# Patient Record
Sex: FEMALE | Race: WHITE | Employment: OTHER | URBAN - METROPOLITAN AREA
[De-identification: names, ages, dates, MRNs, and addresses within clinical notes are randomized per-mention and may not be internally consistent; named-entity substitution may affect disease eponyms.]

---

## 2019-04-09 ENCOUNTER — PATIENT OUTREACH (OUTPATIENT)
Dept: CASE MANAGEMENT | Age: 79
End: 2019-04-09

## 2019-04-09 ENCOUNTER — HOSPITAL ENCOUNTER (OUTPATIENT)
Dept: LAB | Age: 79
Discharge: HOME | End: 2019-04-09
Payer: MEDICARE

## 2019-04-09 DIAGNOSIS — C50.919 MALIGNANT NEOPLASM OF FEMALE BREAST, UNSPECIFIED ESTROGEN RECEPTOR STATUS, UNSPECIFIED LATERALITY, UNSPECIFIED SITE OF BREAST (HCC): ICD-10-CM

## 2019-04-09 LAB
ALBUMIN SERPL-MCNC: 3.7 G/DL (ref 3.2–4.6)
ALBUMIN/GLOB SERPL: 1 {RATIO} (ref 1.2–3.5)
ALP SERPL-CCNC: 58 U/L (ref 50–136)
ALT SERPL-CCNC: 17 U/L (ref 12–65)
ANION GAP SERPL CALC-SCNC: 5 MMOL/L (ref 7–16)
AST SERPL-CCNC: 8 U/L (ref 15–37)
BASOPHILS # BLD: 0.1 K/UL (ref 0–0.2)
BASOPHILS NFR BLD: 1 % (ref 0–2)
BILIRUB SERPL-MCNC: 0.3 MG/DL (ref 0.2–1.1)
BUN SERPL-MCNC: 16 MG/DL (ref 8–23)
CALCIUM SERPL-MCNC: 9.2 MG/DL (ref 8.3–10.4)
CHLORIDE SERPL-SCNC: 108 MMOL/L (ref 98–107)
CO2 SERPL-SCNC: 28 MMOL/L (ref 21–32)
CREAT SERPL-MCNC: 0.91 MG/DL (ref 0.6–1)
DIFFERENTIAL METHOD BLD: ABNORMAL
EOSINOPHIL # BLD: 0.2 K/UL (ref 0–0.8)
EOSINOPHIL NFR BLD: 3 % (ref 0.5–7.8)
ERYTHROCYTE [DISTWIDTH] IN BLOOD BY AUTOMATED COUNT: 16.6 % (ref 11.9–14.6)
GLOBULIN SER CALC-MCNC: 3.7 G/DL (ref 2.3–3.5)
GLUCOSE SERPL-MCNC: 107 MG/DL (ref 65–100)
HCT VFR BLD AUTO: 38.9 % (ref 35.8–46.3)
HGB BLD-MCNC: 12.5 G/DL (ref 11.7–15.4)
IMM GRANULOCYTES # BLD AUTO: 0 K/UL (ref 0–0.5)
IMM GRANULOCYTES NFR BLD AUTO: 0 % (ref 0–5)
LYMPHOCYTES # BLD: 2.8 K/UL (ref 0.5–4.6)
LYMPHOCYTES NFR BLD: 41 % (ref 13–44)
MCH RBC QN AUTO: 28 PG (ref 26.1–32.9)
MCHC RBC AUTO-ENTMCNC: 32.1 G/DL (ref 31.4–35)
MCV RBC AUTO: 87.2 FL (ref 79.6–97.8)
MONOCYTES # BLD: 0.5 K/UL (ref 0.1–1.3)
MONOCYTES NFR BLD: 7 % (ref 4–12)
NEUTS SEG # BLD: 3.2 K/UL (ref 1.7–8.2)
NEUTS SEG NFR BLD: 48 % (ref 43–78)
NRBC # BLD: 0 K/UL (ref 0–0.2)
PLATELET # BLD AUTO: 291 K/UL (ref 150–450)
PMV BLD AUTO: 9.9 FL (ref 9.4–12.3)
POTASSIUM SERPL-SCNC: 3.8 MMOL/L (ref 3.5–5.1)
PROT SERPL-MCNC: 7.4 G/DL (ref 6.3–8.2)
RBC # BLD AUTO: 4.46 M/UL (ref 4.05–5.25)
SODIUM SERPL-SCNC: 141 MMOL/L (ref 136–145)
WBC # BLD AUTO: 6.7 K/UL (ref 4.3–11.1)

## 2019-04-09 PROCEDURE — 80053 COMPREHEN METABOLIC PANEL: CPT

## 2019-04-09 PROCEDURE — 36415 COLL VENOUS BLD VENIPUNCTURE: CPT

## 2019-04-09 PROCEDURE — 85025 COMPLETE CBC W/AUTO DIFF WBC: CPT

## 2019-04-09 NOTE — PROGRESS NOTES
4/9/2019 Saw the patient with Dr Johan Barba. She recently was diagnosed with with breast cancer. Her breast cancer was estrogen positive, progesterone positive and Her 2 positive by FISH. Dr Johan Barba would like her to have an MRI. We will also arrange for echo, port placement and chemo education for HILDA MARISCAL. She will be presented at tumor board after her MRI. Navigation information given to the patient. Will continue to follow.

## 2019-04-17 ENCOUNTER — ANESTHESIA EVENT (OUTPATIENT)
Dept: SURGERY | Age: 79
End: 2019-04-17
Payer: MEDICARE

## 2019-04-17 RX ORDER — SODIUM CHLORIDE, SODIUM LACTATE, POTASSIUM CHLORIDE, CALCIUM CHLORIDE 600; 310; 30; 20 MG/100ML; MG/100ML; MG/100ML; MG/100ML
75 INJECTION, SOLUTION INTRAVENOUS CONTINUOUS
Status: CANCELLED | OUTPATIENT
Start: 2019-04-17

## 2019-04-17 RX ORDER — LIDOCAINE HYDROCHLORIDE 10 MG/ML
0.1 INJECTION INFILTRATION; PERINEURAL AS NEEDED
Status: CANCELLED | OUTPATIENT
Start: 2019-04-17

## 2019-04-17 RX ORDER — FAMOTIDINE 20 MG/1
20 TABLET, FILM COATED ORAL ONCE
Status: CANCELLED | OUTPATIENT
Start: 2019-04-17 | End: 2019-04-17

## 2019-04-17 RX ORDER — OXYCODONE HYDROCHLORIDE 5 MG/1
10 TABLET ORAL
Status: CANCELLED | OUTPATIENT
Start: 2019-04-17 | End: 2019-04-18

## 2019-04-17 RX ORDER — SODIUM CHLORIDE, SODIUM LACTATE, POTASSIUM CHLORIDE, CALCIUM CHLORIDE 600; 310; 30; 20 MG/100ML; MG/100ML; MG/100ML; MG/100ML
75 INJECTION, SOLUTION INTRAVENOUS CONTINUOUS
Status: CANCELLED | OUTPATIENT
Start: 2019-04-17 | End: 2019-04-18

## 2019-04-17 RX ORDER — OXYCODONE HYDROCHLORIDE 5 MG/1
5 TABLET ORAL
Status: CANCELLED | OUTPATIENT
Start: 2019-04-17 | End: 2019-04-18

## 2019-04-17 RX ORDER — HYDROMORPHONE HYDROCHLORIDE 2 MG/ML
0.5 INJECTION, SOLUTION INTRAMUSCULAR; INTRAVENOUS; SUBCUTANEOUS
Status: CANCELLED | OUTPATIENT
Start: 2019-04-17

## 2019-04-18 ENCOUNTER — HOSPITAL ENCOUNTER (OUTPATIENT)
Dept: INTERVENTIONAL RADIOLOGY/VASCULAR | Age: 79
Discharge: HOME | End: 2019-04-18
Attending: INTERNAL MEDICINE
Payer: MEDICARE

## 2019-04-18 ENCOUNTER — ANESTHESIA (OUTPATIENT)
Dept: SURGERY | Age: 79
End: 2019-04-18
Payer: MEDICARE

## 2019-04-18 ENCOUNTER — HOSPITAL ENCOUNTER (OUTPATIENT)
Age: 79
Setting detail: OUTPATIENT SURGERY
Discharge: HOME | End: 2019-04-18
Attending: RADIOLOGY | Admitting: RADIOLOGY
Payer: MEDICARE

## 2019-04-18 VITALS
SYSTOLIC BLOOD PRESSURE: 144 MMHG | RESPIRATION RATE: 13 BRPM | OXYGEN SATURATION: 94 % | DIASTOLIC BLOOD PRESSURE: 67 MMHG | TEMPERATURE: 98 F | HEART RATE: 67 BPM

## 2019-04-18 VITALS
RESPIRATION RATE: 16 BRPM | TEMPERATURE: 98.2 F | DIASTOLIC BLOOD PRESSURE: 95 MMHG | BODY MASS INDEX: 30.22 KG/M2 | WEIGHT: 188 LBS | SYSTOLIC BLOOD PRESSURE: 171 MMHG | HEART RATE: 60 BPM | HEIGHT: 66 IN | OXYGEN SATURATION: 95 %

## 2019-04-18 DIAGNOSIS — C50.911 MALIGNANT NEOPLASM OF RIGHT BREAST IN FEMALE, ESTROGEN RECEPTOR POSITIVE, UNSPECIFIED SITE OF BREAST (HCC): ICD-10-CM

## 2019-04-18 DIAGNOSIS — Z17.0 MALIGNANT NEOPLASM OF RIGHT BREAST IN FEMALE, ESTROGEN RECEPTOR POSITIVE, UNSPECIFIED SITE OF BREAST (HCC): ICD-10-CM

## 2019-04-18 PROCEDURE — 77030031131 HC SUT MXN P COVD -B

## 2019-04-18 PROCEDURE — 77030037400 HC ADH TISS HI VISC EXOFIN CHMP -B

## 2019-04-18 PROCEDURE — 74011250636 HC RX REV CODE- 250/636

## 2019-04-18 PROCEDURE — 74011000250 HC RX REV CODE- 250: Performed by: PHYSICIAN ASSISTANT

## 2019-04-18 PROCEDURE — C1788 PORT, INDWELLING, IMP: HCPCS

## 2019-04-18 PROCEDURE — 74011250637 HC RX REV CODE- 250/637: Performed by: ANESTHESIOLOGY

## 2019-04-18 PROCEDURE — 36561 INSERT TUNNELED CV CATH: CPT

## 2019-04-18 PROCEDURE — 76060000032 HC ANESTHESIA 0.5 TO 1 HR: Performed by: RADIOLOGY

## 2019-04-18 PROCEDURE — 74011250636 HC RX REV CODE- 250/636: Performed by: ANESTHESIOLOGY

## 2019-04-18 PROCEDURE — 74011250636 HC RX REV CODE- 250/636: Performed by: PHYSICIAN ASSISTANT

## 2019-04-18 PROCEDURE — C1894 INTRO/SHEATH, NON-LASER: HCPCS

## 2019-04-18 PROCEDURE — 77030031139 HC SUT VCRL2 J&J -A

## 2019-04-18 RX ORDER — SODIUM CHLORIDE, SODIUM LACTATE, POTASSIUM CHLORIDE, CALCIUM CHLORIDE 600; 310; 30; 20 MG/100ML; MG/100ML; MG/100ML; MG/100ML
75 INJECTION, SOLUTION INTRAVENOUS CONTINUOUS
Status: DISCONTINUED | OUTPATIENT
Start: 2019-04-18 | End: 2019-04-19 | Stop reason: HOSPADM

## 2019-04-18 RX ORDER — LIDOCAINE HYDROCHLORIDE 20 MG/ML
INJECTION, SOLUTION EPIDURAL; INFILTRATION; INTRACAUDAL; PERINEURAL AS NEEDED
Status: DISCONTINUED | OUTPATIENT
Start: 2019-04-18 | End: 2019-04-18 | Stop reason: HOSPADM

## 2019-04-18 RX ORDER — PROPOFOL 10 MG/ML
INJECTION, EMULSION INTRAVENOUS AS NEEDED
Status: DISCONTINUED | OUTPATIENT
Start: 2019-04-18 | End: 2019-04-18 | Stop reason: HOSPADM

## 2019-04-18 RX ORDER — HEPARIN SODIUM 200 [USP'U]/100ML
1000 INJECTION, SOLUTION INTRAVENOUS AS NEEDED
Status: DISCONTINUED | OUTPATIENT
Start: 2019-04-18 | End: 2019-04-21 | Stop reason: HOSPADM

## 2019-04-18 RX ORDER — CEFAZOLIN SODIUM/WATER 2 G/20 ML
2 SYRINGE (ML) INTRAVENOUS ONCE
Status: COMPLETED | OUTPATIENT
Start: 2019-04-18 | End: 2019-04-18

## 2019-04-18 RX ORDER — PROPOFOL 10 MG/ML
INJECTION, EMULSION INTRAVENOUS
Status: DISCONTINUED | OUTPATIENT
Start: 2019-04-18 | End: 2019-04-18 | Stop reason: HOSPADM

## 2019-04-18 RX ORDER — HEPARIN 100 UNIT/ML
500 SYRINGE INTRAVENOUS AS NEEDED
Status: DISCONTINUED | OUTPATIENT
Start: 2019-04-18 | End: 2019-04-21 | Stop reason: HOSPADM

## 2019-04-18 RX ORDER — FAMOTIDINE 20 MG/1
20 TABLET, FILM COATED ORAL ONCE
Status: COMPLETED | OUTPATIENT
Start: 2019-04-18 | End: 2019-04-18

## 2019-04-18 RX ORDER — LIDOCAINE HYDROCHLORIDE 10 MG/ML
0.1 INJECTION INFILTRATION; PERINEURAL AS NEEDED
Status: DISCONTINUED | OUTPATIENT
Start: 2019-04-18 | End: 2019-04-21 | Stop reason: HOSPADM

## 2019-04-18 RX ADMIN — LIDOCAINE HYDROCHLORIDE 15 ML: 10; .005 INJECTION, SOLUTION EPIDURAL; INFILTRATION; INTRACAUDAL; PERINEURAL at 10:33

## 2019-04-18 RX ADMIN — SODIUM CHLORIDE, PRESERVATIVE FREE 500 UNITS: 5 INJECTION INTRAVENOUS at 10:48

## 2019-04-18 RX ADMIN — PROPOFOL 40 MG: 10 INJECTION, EMULSION INTRAVENOUS at 10:21

## 2019-04-18 RX ADMIN — LIDOCAINE HYDROCHLORIDE 60 MG: 20 INJECTION, SOLUTION EPIDURAL; INFILTRATION; INTRACAUDAL; PERINEURAL at 10:21

## 2019-04-18 RX ADMIN — Medication 2 G: at 10:27

## 2019-04-18 RX ADMIN — HEPARIN SODIUM 2000 UNITS: 200 INJECTION, SOLUTION INTRAVENOUS at 10:48

## 2019-04-18 RX ADMIN — SODIUM CHLORIDE, SODIUM LACTATE, POTASSIUM CHLORIDE, AND CALCIUM CHLORIDE: 600; 310; 30; 20 INJECTION, SOLUTION INTRAVENOUS at 10:13

## 2019-04-18 RX ADMIN — FAMOTIDINE 20 MG: 20 TABLET ORAL at 10:04

## 2019-04-18 RX ADMIN — PROPOFOL 100 MCG/KG/MIN: 10 INJECTION, EMULSION INTRAVENOUS at 10:21

## 2019-04-18 NOTE — DISCHARGE INSTRUCTIONS
[address]  Department of Interventional Radiology  [facility]  [phone] Office  [phone] Fax  Implanted Port Discharge Instructions      General Instructions:   A port is like an implanted IV. They are usually ordered for patients who will be getting chemotherapy, but can also be used as an IV for long term antibiotics, large amounts of fluids, and/or blood products. Your blood can be drawn from your port for labs also. Those patients who do not have good veins find the ports convenient as they can get the IV they need with one stick. The port can be used long term, and the care is easy. The device is under the skin, and once the skin heals, care is minimal. All that is required is the nurse who accesses the port will need to flush it with heparinized saline after each use. Ports are usually placed in the chest wall, usually on the right side. But they can be place in the arms and in the abdomen. Home Care Instructions: If your port is in your arm, do not allow blood pressure or other IVs to be place in that arm. Do not allow bra straps or any clothing to rub the skin over the port. Do not bathe or swim until the skin has healed and if the port is accessed. Once it is healed, and when the port is not accessed, it is okay to bathe and swim. Restrict yourself to light activity for the first 5 days after getting the port put in, after that, resume normal activity slowly. You may resume your normal diet and medications. Follow-Up Instructions: Please see your oncologist, or whatever physician ordered the port as he/she has requested of you. Call If: You should call your Physician and/or the Radiology Nurse if you notice redness, pus, swelling, or pain from the area of your incision. Call if you should develop a fever. The nurses who access your port will know to call your doctor if the port does not seem to be working properly. You need to tell the nurses who use the port if you should have any pain or swelling at the site during an infusion. To Reach Us: If you have any questions about your procedure, please call the Interventional Radiology department at [phone]. After business hours (5pm) and weekends, call the answering service at [phone] and ask for the Radiologist on call to be paged. Interventional Radiology General Nurse Discharge    After general anesthesia or intravenous sedation, for 24 hours or while taking prescription Narcotics:  · Limit your activities  · Do not drive and operate hazardous machinery  · Do not make important personal or business decisions  · Do  not drink alcoholic beverages  · If you have not urinated within 8 hours after discharge, please contact your surgeon on call. * Please give a list of your current medications to your Primary Care Provider. * Please update this list whenever your medications are discontinued, doses are     changed, or new medications (including over-the-counter products) are added. * Please carry medication information at all times in case of emergency situations. These are general instructions for a healthy lifestyle:    No smoking/ No tobacco products/ Avoid exposure to second hand smoke  Surgeon General's Warning:  Quitting smoking now greatly reduces serious risk to your health. Obesity, smoking, and sedentary lifestyle greatly increases your risk for illness  A healthy diet, regular physical exercise & weight monitoring are important for maintaining a healthy lifestyle    You may be retaining fluid if you have a history of heart failure or if you experience any of the following symptoms:  Weight gain of 3 pounds or more overnight or 5 pounds in a week, increased swelling in our hands or feet or shortness of breath while lying flat in bed.   Please call your doctor as soon as you notice any of these symptoms; do not wait until your next office visit. Recognize signs and symptoms of STROKE:  F-face looks uneven    A-arms unable to move or move unevenly    S-speech slurred or non-existent    T-time-call 911 as soon as signs and symptoms begin-DO NOT go       Back to bed or wait to see if you get better-TIME IS BRAIN.                 Patient Signature:  Date: 4/18/2019  Discharging Nurse: Victorino Mcclellan RN

## 2019-04-18 NOTE — H&P
Department of Interventional Radiology  [phone]    History and Physical    Patient:  Aisha Rausch MRN:  [id number]  SSN:  [id number]    YOB: 1940  Age:  77 y.o. Sex:  female      Primary Care Provider:  Mirian Freire DO  Referring Physician:  Torres Amado MD    Subjective:     Chief Complaint: port    History of the Present Illness: The patient is a 77 y.o. female who presents for venous chest port placement. Right breast cancer. NPO. Past Medical History:   Diagnosis Date    Anxiety     Cancer [facility])     melanoma    Cancer of right breast ([facility] [address] Dx 04/2019    Crohn's disease ([facility] [address]     Hypertension     Psychiatric disorder     anxiety/depression    Thyroid disease      Past Surgical History:   Procedure Laterality Date    HX HYSTERECTOMY      HX LUMBAR LAMINECTOMY          Review of Systems:    Pertinent items are noted in the History of Present Illness. No current outpatient medications on file. No current facility-administered medications for this encounter. Allergies   Allergen Reactions    Betadine [Povidone-Iodine] Hives    Pcn [Penicillins] Rash    Pentasa [Mesalamine] Other (comments)     Fever/chills       Family History   Problem Relation Age of Onset    Cancer Sister         melanoma    Cancer Brother         lung     Social History     Tobacco Use    Smoking status: Never Smoker    Smokeless tobacco: Never Used   Substance Use Topics    Alcohol use: Never     Frequency: Never        Objective:       Physical Examination:    Vitals:    04/18/19 0942   BP: 196/78   Pulse: (!) 58   Resp: 18   Temp: 98.2 °F (36.8 °C)   SpO2: 92%   Weight: 85.3 kg (188 lb)   Height: 5' 6\" (1.676 m)     Blood pressure 196/78, pulse (!) 58, temperature 98.2 °F (36.8 °C), resp. rate 18, height 5' 6\" (1.676 m), weight 85.3 kg (188 lb), SpO2 92 %.   HEART: regular rate and rhythm  LUNG: clear to auscultation bilaterally  ABDOMEN: normal findings: soft, non-tender  EXTREMITIES: normal strength, tone, and muscle mass    Laboratory:     Lab Results   Component Value Date/Time    Sodium 141 04/09/2019 08:51 AM    Potassium 3.8 04/09/2019 08:51 AM    Chloride 108 (H) 04/09/2019 08:51 AM    CO2 28 04/09/2019 08:51 AM    Anion gap 5 (L) 04/09/2019 08:51 AM    Glucose 107 (H) 04/09/2019 08:51 AM    BUN 16 04/09/2019 08:51 AM    Creatinine 0.91 04/09/2019 08:51 AM    GFR est AA >60 04/09/2019 08:51 AM    GFR est non-AA >60 04/09/2019 08:51 AM    Calcium 9.2 04/09/2019 08:51 AM    Albumin 3.7 04/09/2019 08:51 AM    Protein, total 7.4 04/09/2019 08:51 AM    Globulin 3.7 (H) 04/09/2019 08:51 AM    A-G Ratio 1.0 (L) 04/09/2019 08:51 AM    AST (SGOT) 8 (L) 04/09/2019 08:51 AM    ALT (SGPT) 17 04/09/2019 08:51 AM     Lab Results   Component Value Date/Time    WBC 6.7 04/09/2019 08:51 AM    HGB 12.5 04/09/2019 08:51 AM    HCT 38.9 04/09/2019 08:51 AM    PLATELET 108 50/24/6405 08:51 AM     No results found for: APTT, PTP, INR    Assessment:     Breast cancer    Plan:     Planned Procedure:  Port placement    Risks, benefits, and alternatives reviewed with patient and she agrees to proceed with the procedure.       Signed By: Fercho Allison PA-C     April 18, 2019

## 2019-04-18 NOTE — ANESTHESIA POSTPROCEDURE EVALUATION
Procedure(s): 
IR ANESTHESIA/ PORT INSERT/ [place] TO DO. 
 
total IV anesthesia Anesthesia Post Evaluation Multimodal analgesia: multimodal analgesia not used between 6 hours prior to anesthesia start to PACU discharge Patient location during evaluation: bedside Patient participation: complete - patient participated Level of consciousness: awake and alert Pain management: adequate Airway patency: patent Anesthetic complications: no 
Cardiovascular status: hemodynamically stable Respiratory status: acceptable Hydration status: acceptable No vitals data found for the desired time range.

## 2019-04-18 NOTE — PROCEDURES
Department of Interventional Radiology  [phone]        Interventional Radiology Brief Procedure Note    Patient: Lara Craig MRN: [id number]  SSN: [id number]    YOB: 1940  Age: 77 y.o.   Sex: female      Date of Procedure: 4/18/2019    Pre-Procedure Diagnosis: breast cancer    Post-Procedure Diagnosis: SAME    Procedure(s): Venous Chest Port Placement    Brief Description of Procedure: US, fluoro guided left IJ venous chest port placed    Performed By: Radu Everett PA-C     Assistants: None    Anesthesia:TIVS/MAC    Estimated Blood Loss: Less than 10ml    Specimens:  None    Implants:  Chest Port Placement    Findings: catheter tip in right atrium     Complications: None    Recommendations: ok to use port     Follow Up: referring MD    Signed By: Radu Everett PA-C     April 18, 2019

## 2019-04-18 NOTE — PROGRESS NOTES
Recovery period without difficulty. Pt alert and oriented and denies pain. Dressing is clean, dry, and intact. Reviewed discharge instructions with patient and daughter, both verbalized understanding. Pt escorted to lobby discharge area via wheelchair. Vital signs and Michelle score completed.

## 2019-04-18 NOTE — PROGRESS NOTES
Patient to [address] [place] [address] for procedure. Patient under care of anesthesia, please refer to anesthesia notes for intraprocedure vital signs and medications administered.

## 2019-04-18 NOTE — ANESTHESIA PREPROCEDURE EVALUATION
Relevant Problems No relevant active problems Anesthetic History Review of Systems / Medical History Pulmonary Sleep apnea: No treatment Neuro/Psych Psychiatric history Cardiovascular Hypertension: well controlled GI/Hepatic/Renal 
  
 
 
 
 
 
 Endo/Other Hypothyroidism: well controlled Obesity Other Findings Comments: Breast cancer Physical Exam 
 
Airway Mallampati: II 
TM Distance: > 6 cm Neck ROM: normal range of motion Mouth opening: Normal 
 
 Cardiovascular Regular rate and rhythm,  S1 and S2 normal,  no murmur, click, rub, or gallop Rhythm: regular Rate: normal 
 
 
 
 Dental 
No notable dental hx Pulmonary Breath sounds clear to auscultation Abdominal 
 
 
 
 Other Findings Anesthetic Plan ASA: 3 Anesthesia type: total IV anesthesia Anesthetic plan and risks discussed with: Patient

## 2019-04-24 ENCOUNTER — HOSPITAL ENCOUNTER (OUTPATIENT)
Dept: MRI IMAGING | Age: 79
Discharge: HOME | End: 2019-04-24
Attending: INTERNAL MEDICINE
Payer: MEDICARE

## 2019-04-24 DIAGNOSIS — C50.911 MALIGNANT NEOPLASM OF RIGHT BREAST IN FEMALE, ESTROGEN RECEPTOR POSITIVE, UNSPECIFIED SITE OF BREAST (HCC): ICD-10-CM

## 2019-04-24 DIAGNOSIS — Z17.0 MALIGNANT NEOPLASM OF RIGHT BREAST IN FEMALE, ESTROGEN RECEPTOR POSITIVE, UNSPECIFIED SITE OF BREAST (HCC): ICD-10-CM

## 2019-04-24 PROCEDURE — 77049 MRI BREAST C-+ W/CAD BI: CPT

## 2019-04-24 PROCEDURE — 74011250636 HC RX REV CODE- 250/636: Performed by: INTERNAL MEDICINE

## 2019-04-24 PROCEDURE — A9575 INJ GADOTERATE MEGLUMI 0.1ML: HCPCS | Performed by: INTERNAL MEDICINE

## 2019-04-24 PROCEDURE — 74011000258 HC RX REV CODE- 258: Performed by: INTERNAL MEDICINE

## 2019-04-24 RX ORDER — GADOTERATE MEGLUMINE 376.9 MG/ML
18 INJECTION INTRAVENOUS
Status: COMPLETED | OUTPATIENT
Start: 2019-04-24 | End: 2019-04-24

## 2019-04-24 RX ORDER — SODIUM CHLORIDE 0.9 % (FLUSH) 0.9 %
10 SYRINGE (ML) INJECTION
Status: COMPLETED | OUTPATIENT
Start: 2019-04-24 | End: 2019-04-24

## 2019-04-24 RX ADMIN — GADOTERATE MEGLUMINE 18 ML: 376.9 INJECTION INTRAVENOUS at 12:10

## 2019-04-24 RX ADMIN — Medication 10 ML: at 12:10

## 2019-04-24 RX ADMIN — SODIUM CHLORIDE 100 ML: 900 INJECTION, SOLUTION INTRAVENOUS at 12:10

## 2019-04-25 ENCOUNTER — HOSPITAL ENCOUNTER (OUTPATIENT)
Dept: RADIATION ONCOLOGY | Age: 79
Discharge: HOME | End: 2019-04-25
Payer: MEDICARE

## 2019-04-25 ENCOUNTER — DOCUMENTATION ONLY (OUTPATIENT)
Dept: HEMATOLOGY | Age: 79
End: 2019-04-25

## 2019-04-25 VITALS
OXYGEN SATURATION: 96 % | SYSTOLIC BLOOD PRESSURE: 162 MMHG | WEIGHT: 188.8 LBS | BODY MASS INDEX: 30.47 KG/M2 | DIASTOLIC BLOOD PRESSURE: 76 MMHG | TEMPERATURE: 97.8 F | HEART RATE: 65 BPM

## 2019-04-25 PROCEDURE — 99211 OFF/OP EST MAY X REQ PHY/QHP: CPT

## 2019-04-25 RX ORDER — VENLAFAXINE HYDROCHLORIDE 150 MG/1
CAPSULE, EXTENDED RELEASE ORAL DAILY
COMMUNITY
End: 2020-01-10

## 2019-04-25 NOTE — NURSE NAVIGATOR
Consult right breast. 
MRI 19. Chemo ed 19. History melanoma to back and face. Plan chemo, surgery. Pt has port. Getting Humira for Crohn's disease. Pt is  2/ Para 0-one miscarriage and one stillborn. Started menses around 11 yoa. Menses ended with total hysterectomy. History of IUD-no oral contraceptive use. Unsure of HRT history. Plan see/sim 3-4 weeks past surgery.  
 
 
Rizwan Mcgregor RN

## 2019-04-25 NOTE — CONSULTS
Patient: Marek Taveras MRN: [id number]  SSN: [id number]    YOB: 1940  Age: 77 y.o. Sex: female      Other Providers: Chuck Jaeger MD    CHIEF COMPLAINT: Breast cancer    DIAGNOSIS: invasive ductal carcinoma of the right breast, intermediate grade, %, UT 10%, HER-2 equivocal (2+), but HER-2 positive by FISH, clinical T1c N0    HISTORY OF PRESENT ILLNESS:  Marek Taveras is a 77 y.o. female who I am seeing at the request of Dr. Ghassan Dc. She has a medical history significant for anemia, anxiety, Crohn's disease, GERD, depression, melanoma, hypertension, hypothyroidism, hyperlipidemia. She was found to have an abnormality in the right breast on routine screening mammogram. She has a remote history of breast biopsy with benign finding. Right breast diagnostic mammogram and ultrasound on 03/18/19 revealed spiculated 1.5 cm mass in the upper inner quadrant of the right breast.  Ultrasound revealed an irregular, hypoechoic, approximately 1.5 cm mass at the 2 o'clock position in the right breast 3 cm from the nipple corresponding to mammographic abnormality. This was felt to be highly suggestive of malignancy, BI-RADS 5. Biopsy on 03/29/19 revealed invasive ductal carcinoma, grade 2, %, UT 10%, HER-2 equivocal (2+), but HER-2 positive by FISH. She discussed management options with Dr. Ghassan Dc and was recommended to undergo chemotherapy given her HER-2 positive status. He recommended neoadjuvant TCH chemotherapy. MRI of the breasts on 04/24/19 showed a 1.8 x 1.6 x 2 cm spiculated appearing enhancing mass underlying the right breast skin marker consistent with the patient's known IDC. There were no other areas of abnormality in either breast.  There was no evidence of lymphadenopathy. She is here today to discuss the potential role of adjuvant radiation therapy in further treatment of this stage I right breast cancer. At this time, Ms. Zoe Vogel is doing reasonably well.   She has recently had her Port-A-Cath placed and has minimal residual tenderness. She has significant anxiety related to her cancer diagnosis. However, she has no systemic complaints and has good functional status. OBSTETRIC HISTORY:    Menarche at the age of 11. G2,P0. Oral Contraceptive Pills:  None  Hormone Replacement Therapy:  None    PAST MEDICAL HISTORY:    Past Medical History:   Diagnosis Date    Anxiety     Cancer [facility])     melanoma    Cancer of right breast ([facility] [address] Dx 04/2019    Crohn's disease ([facility] [address]     Hypertension     Psychiatric disorder     anxiety/depression    Thyroid disease        The patient denies history of collagen vascular diseases, pacemaker insertion, prior radiation or prior chemotherapy. PAST SURGICAL HISTORY:   Past Surgical History:   Procedure Laterality Date    HX HYSTERECTOMY      HX LUMBAR LAMINECTOMY      IR INSERT TUNL CVC W PORT OVER 5 YEARS  4/18/2019       MEDICATIONS:     Current Outpatient Medications:     venlafaxine-SR (EFFEXOR XR) 150 mg capsule, Take  by mouth daily. , Disp: , Rfl:     amLODIPine (NORVASC) 10 mg tablet, Take  by mouth daily. , Disp: , Rfl:     hydrALAZINE (APRESOLINE) 25 mg tablet, Take 25 mg by mouth four (4) times daily. , Disp: , Rfl:     losartan (COZAAR) 50 mg tablet, Take  by mouth daily. , Disp: , Rfl:     hydroCHLOROthiazide (HYDRODIURIL) 25 mg tablet, Take 25 mg by mouth daily. , Disp: , Rfl:     metoprolol tartrate (LOPRESSOR) 25 mg tablet, Take  by mouth two (2) times a day., Disp: , Rfl:     levothyroxine (SYNTHROID) 75 mcg tablet, Take  by mouth Daily (before breakfast). , Disp: , Rfl:     esomeprazole (NEXIUM) 40 mg capsule, Take  by mouth daily. , Disp: , Rfl:     oxybutynin chloride XL (DITROPAN XL) 10 mg CR tablet, Take 10 mg by mouth daily. , Disp: , Rfl:     escitalopram oxalate (LEXAPRO) 10 mg tablet, Take 2.5 mg by mouth daily. , Disp: , Rfl:     potassium chloride SR (KLOR-CON 10) 10 mEq tablet, Take  by mouth daily. , Disp: , Rfl:   primidone (MYSOLINE) 50 mg tablet, Take 100 mg by mouth two (2) times a day., Disp: , Rfl:     melatonin 3 mg tablet, Take 6 mg by mouth nightly., Disp: , Rfl:     OLANZapine (ZYPREXA) 2.5 mg tablet, Take 2.5 mg by mouth daily. , Disp: , Rfl:     OLANZapine (ZYPREXA) 5 mg tablet, Take 5 mg by mouth two (2) times a day., Disp: , Rfl:     adalimumab (HUMIRA) 40 mg/0.8 mL injection, by SubCUTAneous route every seven (7) days. Indications: Crohn's disease, Disp: , Rfl:   No current facility-administered medications for this encounter.      ALLERGIES:   Allergies   Allergen Reactions    Betadine [Povidone-Iodine] Hives    Pcn [Penicillins] Rash    Pentasa [Mesalamine] Other (comments)     Fever/chills       SOCIAL HISTORY:   Social History     Socioeconomic History    Marital status:      Spouse name: Not on file    Number of children: Not on file    Years of education: Not on file    Highest education level: Not on file   Occupational History    Not on file   Social Needs    Financial resource strain: Not on file    Food insecurity:     Worry: Not on file     Inability: Not on file    Transportation needs:     Medical: Not on file     Non-medical: Not on file   Tobacco Use    Smoking status: Never Smoker    Smokeless tobacco: Never Used   Substance and Sexual Activity    Alcohol use: Never     Frequency: Never    Drug use: Never    Sexual activity: Never   Lifestyle    Physical activity:     Days per week: Not on file     Minutes per session: Not on file    Stress: Not on file   Relationships    Social connections:     Talks on phone: Not on file     Gets together: Not on file     Attends Voodoo service: Not on file     Active member of club or organization: Not on file     Attends meetings of clubs or organizations: Not on file     Relationship status: Not on file    Intimate partner violence:     Fear of current or ex partner: Not on file     Emotionally abused: Not on file Physically abused: Not on file     Forced sexual activity: Not on file   Other Topics Concern     Service Not Asked    Blood Transfusions Not Asked    Caffeine Concern Not Asked    Occupational Exposure Not Asked    Hobby Hazards Not Asked    Sleep Concern Not Asked    Stress Concern Not Asked    Weight Concern Not Asked    Special Diet Not Asked    Back Care Not Asked    Exercise Not Asked    Bike Helmet Not Asked   [address] Not Asked    Self-Exams Not Asked   Social History Narrative    Not on file       FAMILY HISTORY:   Family History   Problem Relation Age of Onset    Cancer Sister         melanoma    Cancer Brother         lung       REVIEW OF SYSTEMS: Please see the completed review of systems sheet in the chart that I have reviewed today. PHYSICAL EXAMINATION:   ECOG Performance status 1  VITAL SIGNS:   Visit Vitals  /76 (BP 1 Location: Left arm, BP Patient Position: Sitting)   Pulse 65   Temp 97.8 °F (36.6 °C)   Wt 85.6 kg (188 lb 12.8 oz)   SpO2 96%   BMI 30.47 kg/m²        GENERAL: The patient is well-developed, ambulatory, alert and in no acute distress. HEENT: Head is normocephalic, atraumatic. Pupils are equal, round and reactive to light and accommodation. Extraocular movement intact. NECK: Neck is supple with no masses. CARDIOVASCULAR: Heart has regular rate and rhythm. There are no murmurs, rubs or gallops. Radial pulses are 2+ RESPIRATORY: Lungs are clear to auscultation and percussion. There is normal respiratory effort. GASTROINTESTINAL: The abdomen is soft, non-tender, nondistended with no hepatospelnomagaly. Digital rectal examination: deferred. LYMPHATIC: There is no cervical, supraclavicular or axillary lymphadenopathy bilaterally. MUSCULOSKELETAL: Extremities reveal no cyanosis, clubbing or edema.  is 5+/5. BREASTS: Examination of the left breast reveals no dominant nodules or masses. The right breast has minimal residual evidence of recent biopsy. There is no palpable mass. NEURO:  Cranial nerves II-XII grossly intact. Muscular strength and sensation are intact throughout all four extremities. PATHOLOGY:    03/29/19:     LABORATORY:   Lab Results   Component Value Date/Time    Sodium 141 04/09/2019 08:51 AM    Potassium 3.8 04/09/2019 08:51 AM    Chloride 108 (H) 04/09/2019 08:51 AM    CO2 28 04/09/2019 08:51 AM    Anion gap 5 (L) 04/09/2019 08:51 AM    Glucose 107 (H) 04/09/2019 08:51 AM    BUN 16 04/09/2019 08:51 AM    Creatinine 0.91 04/09/2019 08:51 AM    GFR est AA >60 04/09/2019 08:51 AM    GFR est non-AA >60 04/09/2019 08:51 AM    Calcium 9.2 04/09/2019 08:51 AM    Albumin 3.7 04/09/2019 08:51 AM    Protein, total 7.4 04/09/2019 08:51 AM    Globulin 3.7 (H) 04/09/2019 08:51 AM    A-G Ratio 1.0 (L) 04/09/2019 08:51 AM    AST (SGOT) 8 (L) 04/09/2019 08:51 AM    ALT (SGPT) 17 04/09/2019 08:51 AM     Lab Results   Component Value Date/Time    WBC 6.7 04/09/2019 08:51 AM    HGB 12.5 04/09/2019 08:51 AM    HCT 38.9 04/09/2019 08:51 AM    PLATELET 793 90/17/8512 08:51 AM       RADIOLOGY:       04/24/2019: Mri Breast Bi W Wo Cont  MRI BILATERAL BREASTS WITHOUT AND WITH CONTRAST, 4/24/2019. CLINICAL HISTORY:  New diagnosis of right breast IDC. Technique: Multiplanar multisequence imaging of the breast were performed prior to and following the uneventful intravenous administration of 18 mL of Dotarem. Postcontrast imaging was performed using a dynamic technique. Images were reviewed using the TopOPPS. Sequences obtained: Sagittal T2, axial STIR, axial T1, and axial fat-saturated T1-weighted images prior to and following administration of contrast. Comparison studies: Bilateral mammograms 2/13/2019. FINDINGS: A skin marker has been placed at the 3:00 position of the right breast located 2.5 cm from the nipple marking the patient's biopsy site. The breasts are composed of heterogeneous fibroglandular elements.  No clearly enlarged or dysmorphic appearing right axillary lymph node is seen. Left axillary lymph nodes appear nonspecific as well. . No enlarged internal mammary lymph nodes are seen. Evaluation of the lungs is limited by  MRI imaging. However, no obvious pulmonary masses are seen. No significant pleural effusions are seen. The liver is obscured by cardiac motion artifact and only partially visualized. However, no focal signal abnormalities are seen prior to, or following administration of contrast to suggest a possible hepatic lesion. Following the administration of intravenous contrast, normal enhancement is seen of the heart and vascular structures of the breasts. Minimal background physiologic enhancement is seen of the breasts. Underlying the right breast skin marker, there is a 1.8 cm AP by 1.6 cm wide by 2 cm craniocaudal spiculated appearing enhancing mass demonstrating a central biopsy defect consistent with the patient's known right breast IDC. No additional enhancing masses are seen to suggest potential multifocal or multicentric disease. No evidence of skin thickening, or nipple retraction is seen. No enhancing osseous lesion is seen. IMPRESSION: 1.  1.8 cm x 1.6 cm x 2 cm spiculated appearing enhancing mass underlying the right breast skin marker as described above consistent with the patient's known IDC. No additional concerning enhancement is seen to suggest potential multifocal or multicentric disease. 2. No evidence for metastatic disease in the visualized chest. BI-RADS Assessment Category 6: Known Biopsy Proven Malignancy       IMPRESSION:  Nancy Tyson is a 77 y.o. female with invasive ductal carcinoma of the right breast, intermediate grade, %, CT 10%, HER-2 equivocal (2+), but HER-2 positive by FISH, clinical T1c N0 breast cancer.         COUNSELING AND COORDINATION OF CARE: I have had a 60 minute consultation with Ms. Joan Smalls of which greater than half has been spent counseling her about management options for her Stage I ER/WY positive, HER2 positive right breast cancer. The natural history of breast cancer was reviewed with the patient. Prognostic features including stage, performance status and presence or absence of lymph node involvement were reviewed with the patient. Various treatment options including lumpectomy alone, breast conservation therapy, and mastectomy were compared and contrasted with regard to outcome and quality of life. We discussed the data in support of breast conservation therapy, specifically NSABP B-06, which compared mastectomy to lumpectomy plus or minus radiation. This showed no difference in overall survival but improved local regional control with adjuvant radiation. This has become the standard for patient's wishing to conserve the breast.  Subsequent trials have evaluated the role of boost following an initial course and again showed improved control. We have, therefore, typically recommended 50 Gy to the breast followed by a 10 Gy boost to the lumpectomy cavity. There is now also long-term data in support of hypofractionation which has come out of [address] [place] [address] where 3-4 weeks of radiation was compared to the conventional treatment and to date has shown equivalent tumor control and cosmetic outcomes. This is an option for women who were not excluded from the studies or found on subgroup analysis to suffer worse outcomes: women with Grade III tumors, generally women receiving chemotherapy, with large breast size such that significant heterogeneity can not be avoided, and lymph node node positive disease. SHARRI consensus guidelines now support these conclusions. Based on her disease characteristics and anatomy, Ms. Miguel Denise seems to be a good candidate for a hypofractionated radiation course should she need radiation therapy.      For women 79years old or over, data from [address] [place] [address] [address], a CAL study, has demonstrated no improvement in overall survival with tamoxifen alone as compared to tamoxifen and radiation. No difference in survival has been demonstrated despite a small but statistically significant worsening of local control. For this reason, the omission of radiation is acceptable for many women over the age of 79 who agree to take a 5 year course endocrine therapy. However, because Ms. Vijaya Pinedo has a HER2 positive breast cancer and will require chemotherapy I am somewhat hesitant to omit radiation therapy. We discussed that if she has a pathologic CR to chemotherapy, then omission of radiation therapy would be reasonable. However, if she has considerable residual disease or if she is unable to tolerate chemotherapy, then I will recommend proceeding with adjuvant radiation therapy. The practicalities, indications, benefits, side-effects and complications of radiation therapy were discussed. Skin changes fatigue, and potential for long-term complications including radiation pneumonitis, and rib fractures were among the complications highlighted. She will return for follow-up discussion and possible CT simulation approximately 3-4 weeks after surgery. I appreciate the opportunity to participate in Ms. Edge's care.      Yaniv Cormier MD   April 25, 2019

## 2019-04-25 NOTE — PROGRESS NOTES
I spoke with Ms. Rui King regarding her insurance coverage, potential oral medication authorizations, enrollment in the [address] [place] [address] [facility] and the [address] [place] [address] ([address]), and assistance organization resource sheet. Ms. Rui King has Medicare and [facility]/R insurance secondary. These two insurances pay 100% of hospital OP claims. I gave Ms. Edge the Oncology Care Model participation letter. I let her know that the average patient responsibility for 6 months of treatment for type cancer is $3,948 after Medicare pays. Next, I spoke with Ms. Rui King regarding potential oral medication authorizations. I told her that if she ever had any problems getting her oral medications filled to give the dedicated [facility] , Kiara Velazco, a call. Most of the time, it is simply an authorization that needs to be done with the insurance company. Next, I spoke with Ms. Rui King regarding [facility] with [facility]. I went over some of the services that [facility] and [facility] offers and the enrollment process. Next, I gave Ms. Edge flyers about the free Yoga classes for cancer survivors and the Oncology Massage program.  I let her know that she can get a free 30 minute massage. Lastly, I gave Ms. Rui King a form with various resource organizations that could assist with specific needs (example:  transportation, lodging, preparing meals, home cleaning)     Faxed Patient Referral form to the [address] at [phone]. Phone [phone]. Form scanned into chart. Faxed Physician's Statement to the [address] [place] [address] at [phone]. Phone [phone]. Form scanned into chart. Patient expressed understanding of the information above and all questions were answered to her satisfaction.

## 2019-04-25 NOTE — PROGRESS NOTES
To assist MsJuan Minesh with her prescription co-pays for medications that Dr. Niki Schuler prescribes, I spoke with Ms. Vijaya Pinedo about the [facility] ([facility]) breast cancer co-pay assistance jero. There is 1 in the household and the monthly income is about $2,000. She qualifies for the jero. I spoke with her about the enrollment/application process and what the jero covers. She agreed to allow me to enroll her for the jero and signed the LPOA document. LPOA will be scanned into chart along with [facility] jero approval information. Patient expressed understanding of the above information and all questions were answered to her satisfaction.

## 2019-04-29 PROBLEM — Z17.0 MALIGNANT NEOPLASM OF RIGHT BREAST IN FEMALE, ESTROGEN RECEPTOR POSITIVE (HCC): Status: ACTIVE | Noted: 2019-04-29

## 2019-04-29 PROBLEM — C50.911 MALIGNANT NEOPLASM OF RIGHT BREAST IN FEMALE, ESTROGEN RECEPTOR POSITIVE (HCC): Status: ACTIVE | Noted: 2019-04-29

## 2019-05-02 ENCOUNTER — HOSPITAL ENCOUNTER (OUTPATIENT)
Dept: LAB | Age: 79
Discharge: HOME | End: 2019-05-02
Payer: MEDICARE

## 2019-05-02 ENCOUNTER — HOSPITAL ENCOUNTER (OUTPATIENT)
Dept: INFUSION THERAPY | Age: 79
Discharge: HOME | End: 2019-05-02
Payer: MEDICARE

## 2019-05-02 ENCOUNTER — PATIENT OUTREACH (OUTPATIENT)
Dept: CASE MANAGEMENT | Age: 79
End: 2019-05-02

## 2019-05-02 DIAGNOSIS — Z17.0 MALIGNANT NEOPLASM OF RIGHT BREAST IN FEMALE, ESTROGEN RECEPTOR POSITIVE, UNSPECIFIED SITE OF BREAST (HCC): Primary | ICD-10-CM

## 2019-05-02 DIAGNOSIS — C50.911 MALIGNANT NEOPLASM OF RIGHT BREAST IN FEMALE, ESTROGEN RECEPTOR POSITIVE, UNSPECIFIED SITE OF BREAST (HCC): Primary | ICD-10-CM

## 2019-05-02 DIAGNOSIS — C50.911 MALIGNANT NEOPLASM OF RIGHT BREAST IN FEMALE, ESTROGEN RECEPTOR POSITIVE, UNSPECIFIED SITE OF BREAST (HCC): ICD-10-CM

## 2019-05-02 DIAGNOSIS — Z17.0 MALIGNANT NEOPLASM OF RIGHT BREAST IN FEMALE, ESTROGEN RECEPTOR POSITIVE, UNSPECIFIED SITE OF BREAST (HCC): ICD-10-CM

## 2019-05-02 LAB
ALBUMIN SERPL-MCNC: 3.8 G/DL (ref 3.2–4.6)
ALBUMIN/GLOB SERPL: 1 {RATIO} (ref 1.2–3.5)
ALP SERPL-CCNC: 69 U/L (ref 50–136)
ALT SERPL-CCNC: 24 U/L (ref 12–65)
ANION GAP SERPL CALC-SCNC: 8 MMOL/L (ref 7–16)
AST SERPL-CCNC: 12 U/L (ref 15–37)
BASOPHILS # BLD: 0 K/UL (ref 0–0.2)
BASOPHILS NFR BLD: 0 % (ref 0–2)
BILIRUB SERPL-MCNC: 0.2 MG/DL (ref 0.2–1.1)
BUN SERPL-MCNC: 23 MG/DL (ref 8–23)
CALCIUM SERPL-MCNC: 9.1 MG/DL (ref 8.3–10.4)
CHLORIDE SERPL-SCNC: 104 MMOL/L (ref 98–107)
CO2 SERPL-SCNC: 26 MMOL/L (ref 21–32)
CREAT SERPL-MCNC: 0.95 MG/DL (ref 0.6–1)
DIFFERENTIAL METHOD BLD: ABNORMAL
EOSINOPHIL # BLD: 0 K/UL (ref 0–0.8)
EOSINOPHIL NFR BLD: 0 % (ref 0.5–7.8)
ERYTHROCYTE [DISTWIDTH] IN BLOOD BY AUTOMATED COUNT: 15.9 % (ref 11.9–14.6)
GLOBULIN SER CALC-MCNC: 3.7 G/DL (ref 2.3–3.5)
GLUCOSE SERPL-MCNC: 118 MG/DL (ref 65–100)
HCT VFR BLD AUTO: 37.6 % (ref 35.8–46.3)
HGB BLD-MCNC: 12.1 G/DL (ref 11.7–15.4)
IMM GRANULOCYTES # BLD AUTO: 0 K/UL (ref 0–0.5)
IMM GRANULOCYTES NFR BLD AUTO: 0 % (ref 0–5)
LYMPHOCYTES # BLD: 1.8 K/UL (ref 0.5–4.6)
LYMPHOCYTES NFR BLD: 18 % (ref 13–44)
MAGNESIUM SERPL-MCNC: 2.2 MG/DL (ref 1.8–2.4)
MCH RBC QN AUTO: 28.5 PG (ref 26.1–32.9)
MCHC RBC AUTO-ENTMCNC: 32.2 G/DL (ref 31.4–35)
MCV RBC AUTO: 88.5 FL (ref 79.6–97.8)
MONOCYTES # BLD: 0.4 K/UL (ref 0.1–1.3)
MONOCYTES NFR BLD: 4 % (ref 4–12)
NEUTS SEG # BLD: 7.6 K/UL (ref 1.7–8.2)
NEUTS SEG NFR BLD: 77 % (ref 43–78)
NRBC # BLD: 0 K/UL (ref 0–0.2)
PLATELET # BLD AUTO: 277 K/UL (ref 150–450)
PMV BLD AUTO: 10.1 FL (ref 9.4–12.3)
POTASSIUM SERPL-SCNC: 3.8 MMOL/L (ref 3.5–5.1)
PROT SERPL-MCNC: 7.5 G/DL (ref 6.3–8.2)
RBC # BLD AUTO: 4.25 M/UL (ref 4.05–5.25)
SODIUM SERPL-SCNC: 138 MMOL/L (ref 136–145)
WBC # BLD AUTO: 9.9 K/UL (ref 4.3–11.1)

## 2019-05-02 PROCEDURE — 96367 TX/PROPH/DG ADDL SEQ IV INF: CPT

## 2019-05-02 PROCEDURE — 74011250636 HC RX REV CODE- 250/636

## 2019-05-02 PROCEDURE — 96417 CHEMO IV INFUS EACH ADDL SEQ: CPT

## 2019-05-02 PROCEDURE — 74011250636 HC RX REV CODE- 250/636: Performed by: INTERNAL MEDICINE

## 2019-05-02 PROCEDURE — 96413 CHEMO IV INFUSION 1 HR: CPT

## 2019-05-02 PROCEDURE — 83735 ASSAY OF MAGNESIUM: CPT

## 2019-05-02 PROCEDURE — 74011000258 HC RX REV CODE- 258: Performed by: INTERNAL MEDICINE

## 2019-05-02 PROCEDURE — 96375 TX/PRO/DX INJ NEW DRUG ADDON: CPT

## 2019-05-02 PROCEDURE — 80053 COMPREHEN METABOLIC PANEL: CPT

## 2019-05-02 PROCEDURE — 96415 CHEMO IV INFUSION ADDL HR: CPT

## 2019-05-02 PROCEDURE — 85025 COMPLETE CBC W/AUTO DIFF WBC: CPT

## 2019-05-02 RX ORDER — HEPARIN 100 UNIT/ML
300-500 SYRINGE INTRAVENOUS AS NEEDED
Status: DISPENSED | OUTPATIENT
Start: 2019-05-02 | End: 2019-05-02

## 2019-05-02 RX ORDER — SODIUM CHLORIDE 9 MG/ML
25 INJECTION, SOLUTION INTRAVENOUS CONTINUOUS
Status: ACTIVE | OUTPATIENT
Start: 2019-05-02 | End: 2019-05-02

## 2019-05-02 RX ORDER — DIPHENHYDRAMINE HYDROCHLORIDE 50 MG/ML
50 INJECTION, SOLUTION INTRAMUSCULAR; INTRAVENOUS AS NEEDED
Status: DISCONTINUED | OUTPATIENT
Start: 2019-05-02 | End: 2019-05-03 | Stop reason: HOSPADM

## 2019-05-02 RX ORDER — ONDANSETRON 2 MG/ML
8 INJECTION INTRAMUSCULAR; INTRAVENOUS ONCE
Status: COMPLETED | OUTPATIENT
Start: 2019-05-02 | End: 2019-05-02

## 2019-05-02 RX ORDER — DEXAMETHASONE SODIUM PHOSPHATE 100 MG/10ML
10 INJECTION INTRAMUSCULAR; INTRAVENOUS ONCE
Status: COMPLETED | OUTPATIENT
Start: 2019-05-02 | End: 2019-05-02

## 2019-05-02 RX ORDER — HYDROCORTISONE SODIUM SUCCINATE 100 MG/2ML
100 INJECTION, POWDER, FOR SOLUTION INTRAMUSCULAR; INTRAVENOUS AS NEEDED
Status: DISCONTINUED | OUTPATIENT
Start: 2019-05-02 | End: 2019-05-03 | Stop reason: HOSPADM

## 2019-05-02 RX ORDER — SODIUM CHLORIDE 0.9 % (FLUSH) 0.9 %
10 SYRINGE (ML) INJECTION AS NEEDED
Status: ACTIVE | OUTPATIENT
Start: 2019-05-02 | End: 2019-05-02

## 2019-05-02 RX ADMIN — TRASTUZUMAB 682 MG: 150 INJECTION, POWDER, LYOPHILIZED, FOR SOLUTION INTRAVENOUS at 10:31

## 2019-05-02 RX ADMIN — Medication 500 UNITS: at 14:19

## 2019-05-02 RX ADMIN — DEXAMETHASONE SODIUM PHOSPHATE 10 MG: 10 INJECTION INTRAMUSCULAR; INTRAVENOUS at 12:06

## 2019-05-02 RX ADMIN — CARBOPLATIN 534 MG: 10 INJECTION, SOLUTION INTRAVENOUS at 13:36

## 2019-05-02 RX ADMIN — SODIUM CHLORIDE 25 ML/HR: 900 INJECTION, SOLUTION INTRAVENOUS at 10:02

## 2019-05-02 RX ADMIN — Medication 10 ML: at 10:02

## 2019-05-02 RX ADMIN — SODIUM CHLORIDE 150 MG: 900 INJECTION, SOLUTION INTRAVENOUS at 12:11

## 2019-05-02 RX ADMIN — DOCETAXEL 149 MG: 10 INJECTION, SOLUTION INTRAVENOUS at 12:35

## 2019-05-02 RX ADMIN — Medication 10 ML: at 14:18

## 2019-05-02 RX ADMIN — ONDANSETRON 8 MG: 2 INJECTION INTRAMUSCULAR; INTRAVENOUS at 12:04

## 2019-05-02 NOTE — PROGRESS NOTES
Pt arrived ambulatory to [facility] with port previously accessed with dressing dry and intact and with good blood return. D1C1. NS infusing. Herceptin infused. Pre meds given IV as ordered. Taxotere infusing. Carboplatin infused. Pt tolerated well. Port flushed and packed with heparin and de accessed. Pt and caregiver instructed on giving  Neulasta at home. Verbalized understanding. Pt aware of next appt on 5/24/19 at 1030. Demetris Malloy Pt discharged ambulatory.

## 2019-05-03 ENCOUNTER — APPOINTMENT (OUTPATIENT)
Dept: INFUSION THERAPY | Age: 79
End: 2019-05-03
Payer: MEDICARE

## 2019-05-23 ENCOUNTER — HOSPITAL ENCOUNTER (OUTPATIENT)
Dept: INFUSION THERAPY | Age: 79
Discharge: HOME | End: 2019-05-23
Payer: MEDICARE

## 2019-05-23 ENCOUNTER — PATIENT OUTREACH (OUTPATIENT)
Dept: CASE MANAGEMENT | Age: 79
End: 2019-05-23

## 2019-05-23 ENCOUNTER — HOSPITAL ENCOUNTER (OUTPATIENT)
Dept: LAB | Age: 79
Discharge: HOME | End: 2019-05-23
Payer: MEDICARE

## 2019-05-23 DIAGNOSIS — Z17.0 MALIGNANT NEOPLASM OF RIGHT BREAST IN FEMALE, ESTROGEN RECEPTOR POSITIVE, UNSPECIFIED SITE OF BREAST (HCC): Primary | ICD-10-CM

## 2019-05-23 DIAGNOSIS — C50.911 MALIGNANT NEOPLASM OF RIGHT BREAST IN FEMALE, ESTROGEN RECEPTOR POSITIVE, UNSPECIFIED SITE OF BREAST (HCC): Primary | ICD-10-CM

## 2019-05-23 DIAGNOSIS — Z17.0 MALIGNANT NEOPLASM OF CENTRAL PORTION OF RIGHT BREAST IN FEMALE, ESTROGEN RECEPTOR POSITIVE (HCC): ICD-10-CM

## 2019-05-23 DIAGNOSIS — C50.111 MALIGNANT NEOPLASM OF CENTRAL PORTION OF RIGHT BREAST IN FEMALE, ESTROGEN RECEPTOR POSITIVE (HCC): ICD-10-CM

## 2019-05-23 LAB
ALBUMIN SERPL-MCNC: 3.6 G/DL (ref 3.2–4.6)
ALBUMIN/GLOB SERPL: 1.2 {RATIO} (ref 1.2–3.5)
ALP SERPL-CCNC: 63 U/L (ref 50–136)
ALT SERPL-CCNC: 19 U/L (ref 12–65)
ANION GAP SERPL CALC-SCNC: 8 MMOL/L (ref 7–16)
AST SERPL-CCNC: 10 U/L (ref 15–37)
BASOPHILS # BLD: 0 K/UL (ref 0–0.2)
BASOPHILS NFR BLD: 0 % (ref 0–2)
BILIRUB SERPL-MCNC: 0.2 MG/DL (ref 0.2–1.1)
BUN SERPL-MCNC: 15 MG/DL (ref 8–23)
CALCIUM SERPL-MCNC: 9.4 MG/DL (ref 8.3–10.4)
CHLORIDE SERPL-SCNC: 106 MMOL/L (ref 98–107)
CO2 SERPL-SCNC: 26 MMOL/L (ref 21–32)
CREAT SERPL-MCNC: 0.78 MG/DL (ref 0.6–1)
DIFFERENTIAL METHOD BLD: ABNORMAL
EOSINOPHIL # BLD: 0 K/UL (ref 0–0.8)
EOSINOPHIL NFR BLD: 0 % (ref 0.5–7.8)
ERYTHROCYTE [DISTWIDTH] IN BLOOD BY AUTOMATED COUNT: 17 % (ref 11.9–14.6)
GLOBULIN SER CALC-MCNC: 3 G/DL (ref 2.3–3.5)
GLUCOSE SERPL-MCNC: 126 MG/DL (ref 65–100)
HCT VFR BLD AUTO: 35.3 % (ref 35.8–46.3)
HGB BLD-MCNC: 11.5 G/DL (ref 11.7–15.4)
IMM GRANULOCYTES # BLD AUTO: 0.1 K/UL (ref 0–0.5)
IMM GRANULOCYTES NFR BLD AUTO: 1 % (ref 0–5)
LYMPHOCYTES # BLD: 2.2 K/UL (ref 0.5–4.6)
LYMPHOCYTES NFR BLD: 15 % (ref 13–44)
MAGNESIUM SERPL-MCNC: 1.8 MG/DL (ref 1.8–2.4)
MCH RBC QN AUTO: 29.3 PG (ref 26.1–32.9)
MCHC RBC AUTO-ENTMCNC: 32.6 G/DL (ref 31.4–35)
MCV RBC AUTO: 89.8 FL (ref 79.6–97.8)
MONOCYTES # BLD: 0.9 K/UL (ref 0.1–1.3)
MONOCYTES NFR BLD: 6 % (ref 4–12)
NEUTS SEG # BLD: 11.8 K/UL (ref 1.7–8.2)
NEUTS SEG NFR BLD: 79 % (ref 43–78)
NRBC # BLD: 0 K/UL (ref 0–0.2)
PLATELET # BLD AUTO: 265 K/UL (ref 150–450)
PMV BLD AUTO: 9.8 FL (ref 9.4–12.3)
POTASSIUM SERPL-SCNC: 3.2 MMOL/L (ref 3.5–5.1)
PROT SERPL-MCNC: 6.6 G/DL (ref 6.3–8.2)
RBC # BLD AUTO: 3.93 M/UL (ref 4.05–5.25)
SODIUM SERPL-SCNC: 140 MMOL/L (ref 136–145)
WBC # BLD AUTO: 15 K/UL (ref 4.3–11.1)

## 2019-05-23 PROCEDURE — 74011250636 HC RX REV CODE- 250/636: Performed by: INTERNAL MEDICINE

## 2019-05-23 PROCEDURE — 96417 CHEMO IV INFUS EACH ADDL SEQ: CPT

## 2019-05-23 PROCEDURE — 83735 ASSAY OF MAGNESIUM: CPT

## 2019-05-23 PROCEDURE — 96367 TX/PROPH/DG ADDL SEQ IV INF: CPT

## 2019-05-23 PROCEDURE — 80053 COMPREHEN METABOLIC PANEL: CPT

## 2019-05-23 PROCEDURE — 96413 CHEMO IV INFUSION 1 HR: CPT

## 2019-05-23 PROCEDURE — 96375 TX/PRO/DX INJ NEW DRUG ADDON: CPT

## 2019-05-23 PROCEDURE — 85025 COMPLETE CBC W/AUTO DIFF WBC: CPT

## 2019-05-23 PROCEDURE — 74011250636 HC RX REV CODE- 250/636

## 2019-05-23 PROCEDURE — 74011000258 HC RX REV CODE- 258: Performed by: INTERNAL MEDICINE

## 2019-05-23 RX ORDER — ONDANSETRON 2 MG/ML
8 INJECTION INTRAMUSCULAR; INTRAVENOUS ONCE
Status: COMPLETED | OUTPATIENT
Start: 2019-05-23 | End: 2019-05-23

## 2019-05-23 RX ORDER — DEXAMETHASONE SODIUM PHOSPHATE 100 MG/10ML
10 INJECTION INTRAMUSCULAR; INTRAVENOUS ONCE
Status: COMPLETED | OUTPATIENT
Start: 2019-05-23 | End: 2019-05-23

## 2019-05-23 RX ORDER — SODIUM CHLORIDE 9 MG/ML
25 INJECTION, SOLUTION INTRAVENOUS CONTINUOUS
Status: DISCONTINUED | OUTPATIENT
Start: 2019-05-23 | End: 2019-05-24 | Stop reason: HOSPADM

## 2019-05-23 RX ORDER — SODIUM CHLORIDE 0.9 % (FLUSH) 0.9 %
10 SYRINGE (ML) INJECTION AS NEEDED
Status: DISCONTINUED | OUTPATIENT
Start: 2019-05-23 | End: 2019-05-24 | Stop reason: HOSPADM

## 2019-05-23 RX ORDER — DIPHENHYDRAMINE HYDROCHLORIDE 50 MG/ML
50 INJECTION, SOLUTION INTRAMUSCULAR; INTRAVENOUS AS NEEDED
Status: DISCONTINUED | OUTPATIENT
Start: 2019-05-23 | End: 2019-05-24 | Stop reason: HOSPADM

## 2019-05-23 RX ORDER — HYDROCORTISONE SODIUM SUCCINATE 100 MG/2ML
100 INJECTION, POWDER, FOR SOLUTION INTRAMUSCULAR; INTRAVENOUS AS NEEDED
Status: DISCONTINUED | OUTPATIENT
Start: 2019-05-23 | End: 2019-05-24 | Stop reason: HOSPADM

## 2019-05-23 RX ADMIN — TRASTUZUMAB 512 MG: 150 INJECTION, POWDER, LYOPHILIZED, FOR SOLUTION INTRAVENOUS at 13:50

## 2019-05-23 RX ADMIN — DOCETAXEL 149 MG: 10 INJECTION, SOLUTION INTRAVENOUS at 14:30

## 2019-05-23 RX ADMIN — SODIUM CHLORIDE 25 ML/HR: 900 INJECTION, SOLUTION INTRAVENOUS at 12:20

## 2019-05-23 RX ADMIN — SODIUM CHLORIDE 150 MG: 900 INJECTION, SOLUTION INTRAVENOUS at 13:06

## 2019-05-23 RX ADMIN — CARBOPLATIN 617 MG: 10 INJECTION, SOLUTION INTRAVENOUS at 15:36

## 2019-05-23 RX ADMIN — DEXAMETHASONE SODIUM PHOSPHATE 10 MG: 10 INJECTION INTRAMUSCULAR; INTRAVENOUS at 13:04

## 2019-05-23 RX ADMIN — ONDANSETRON 8 MG: 2 INJECTION INTRAMUSCULAR; INTRAVENOUS at 13:05

## 2019-05-23 NOTE — PROGRESS NOTES
Arrived to the [facility]. Herceptin,Taxotere, and Carboplatin completed. Patient tolerated well. Any issues or concerns during appointment: no.  Patient aware of next infusion appointment on 6/13 (date) at 56 (time). Discharged ambulatory.

## 2019-05-23 NOTE — PROGRESS NOTES
5/23/2019 Saw the patient with Dr Trish Matos. She is here for cycle 2 of 6 cycles. We will obtain a breast ultrasound after her third cycle She is doing well overall. Will continue to follow.

## 2019-05-30 ENCOUNTER — PATIENT OUTREACH (OUTPATIENT)
Dept: CASE MANAGEMENT | Age: 79
End: 2019-05-30

## 2019-05-30 ENCOUNTER — HOSPITAL ENCOUNTER (OUTPATIENT)
Dept: INFUSION THERAPY | Age: 79
Discharge: HOME | End: 2019-05-30
Payer: MEDICARE

## 2019-05-30 ENCOUNTER — HOSPITAL ENCOUNTER (OUTPATIENT)
Dept: LAB | Age: 79
Discharge: HOME | End: 2019-05-30
Payer: MEDICARE

## 2019-05-30 DIAGNOSIS — Z17.0 MALIGNANT NEOPLASM OF RIGHT BREAST IN FEMALE, ESTROGEN RECEPTOR POSITIVE, UNSPECIFIED SITE OF BREAST (HCC): ICD-10-CM

## 2019-05-30 DIAGNOSIS — R11.0 NAUSEA: ICD-10-CM

## 2019-05-30 DIAGNOSIS — E86.0 DEHYDRATION: Primary | ICD-10-CM

## 2019-05-30 DIAGNOSIS — C50.911 MALIGNANT NEOPLASM OF RIGHT BREAST IN FEMALE, ESTROGEN RECEPTOR POSITIVE, UNSPECIFIED SITE OF BREAST (HCC): ICD-10-CM

## 2019-05-30 LAB
ALBUMIN SERPL-MCNC: 3.6 G/DL (ref 3.2–4.6)
ALBUMIN/GLOB SERPL: 1.1 {RATIO} (ref 1.2–3.5)
ALP SERPL-CCNC: 91 U/L (ref 50–136)
ALT SERPL-CCNC: 99 U/L (ref 12–65)
ANION GAP SERPL CALC-SCNC: 10 MMOL/L (ref 7–16)
AST SERPL-CCNC: 31 U/L (ref 15–37)
BASOPHILS # BLD: 0 K/UL (ref 0–0.2)
BASOPHILS NFR BLD: 1 % (ref 0–2)
BILIRUB SERPL-MCNC: 0.3 MG/DL (ref 0.2–1.1)
BUN SERPL-MCNC: 14 MG/DL (ref 8–23)
CALCIUM SERPL-MCNC: 9.4 MG/DL (ref 8.3–10.4)
CHLORIDE SERPL-SCNC: 106 MMOL/L (ref 98–107)
CO2 SERPL-SCNC: 23 MMOL/L (ref 21–32)
CREAT SERPL-MCNC: 0.78 MG/DL (ref 0.6–1)
DIFFERENTIAL METHOD BLD: ABNORMAL
EOSINOPHIL # BLD: 0 K/UL (ref 0–0.8)
EOSINOPHIL NFR BLD: 0 % (ref 0.5–7.8)
ERYTHROCYTE [DISTWIDTH] IN BLOOD BY AUTOMATED COUNT: 16.5 % (ref 11.9–14.6)
GLOBULIN SER CALC-MCNC: 3.2 G/DL (ref 2.3–3.5)
GLUCOSE SERPL-MCNC: 114 MG/DL (ref 65–100)
HCT VFR BLD AUTO: 34 % (ref 35.8–46.3)
HGB BLD-MCNC: 11.2 G/DL (ref 11.7–15.4)
IMM GRANULOCYTES # BLD AUTO: 0 K/UL (ref 0–0.5)
IMM GRANULOCYTES NFR BLD AUTO: 1 % (ref 0–5)
LYMPHOCYTES # BLD: 0.7 K/UL (ref 0.5–4.6)
LYMPHOCYTES NFR BLD: 21 % (ref 13–44)
MAGNESIUM SERPL-MCNC: 1.9 MG/DL (ref 1.8–2.4)
MCH RBC QN AUTO: 29.6 PG (ref 26.1–32.9)
MCHC RBC AUTO-ENTMCNC: 32.9 G/DL (ref 31.4–35)
MCV RBC AUTO: 89.9 FL (ref 79.6–97.8)
MONOCYTES # BLD: 0.4 K/UL (ref 0.1–1.3)
MONOCYTES NFR BLD: 13 % (ref 4–12)
NEUTS SEG # BLD: 2.3 K/UL (ref 1.7–8.2)
NEUTS SEG NFR BLD: 64 % (ref 43–78)
NRBC # BLD: 0 K/UL (ref 0–0.2)
PLATELET # BLD AUTO: 130 K/UL (ref 150–450)
PLATELET COMMENTS,PCOM: ADEQUATE
PMV BLD AUTO: 10.9 FL (ref 9.4–12.3)
POTASSIUM SERPL-SCNC: 4 MMOL/L (ref 3.5–5.1)
PROT SERPL-MCNC: 6.8 G/DL (ref 6.3–8.2)
RBC # BLD AUTO: 3.78 M/UL (ref 4.05–5.25)
RBC MORPH BLD: ABNORMAL
RBC MORPH BLD: ABNORMAL
SODIUM SERPL-SCNC: 139 MMOL/L (ref 136–145)
WBC # BLD AUTO: 3.4 K/UL (ref 4.3–11.1)
WBC MORPH BLD: ABNORMAL

## 2019-05-30 PROCEDURE — 36593 DECLOT VASCULAR DEVICE: CPT

## 2019-05-30 PROCEDURE — 85025 COMPLETE CBC W/AUTO DIFF WBC: CPT

## 2019-05-30 PROCEDURE — 74011250636 HC RX REV CODE- 250/636: Performed by: INTERNAL MEDICINE

## 2019-05-30 PROCEDURE — 36415 COLL VENOUS BLD VENIPUNCTURE: CPT

## 2019-05-30 PROCEDURE — 96360 HYDRATION IV INFUSION INIT: CPT

## 2019-05-30 PROCEDURE — 80053 COMPREHEN METABOLIC PANEL: CPT

## 2019-05-30 PROCEDURE — 83735 ASSAY OF MAGNESIUM: CPT

## 2019-05-30 PROCEDURE — 74011000250 HC RX REV CODE- 250: Performed by: INTERNAL MEDICINE

## 2019-05-30 RX ORDER — SODIUM CHLORIDE 0.9 % (FLUSH) 0.9 %
10-40 SYRINGE (ML) INJECTION AS NEEDED
Status: DISCONTINUED | OUTPATIENT
Start: 2019-05-30 | End: 2019-06-03 | Stop reason: HOSPADM

## 2019-05-30 RX ORDER — ONDANSETRON 2 MG/ML
8 INJECTION INTRAMUSCULAR; INTRAVENOUS ONCE
Status: DISCONTINUED | OUTPATIENT
Start: 2019-05-30 | End: 2019-05-31 | Stop reason: HOSPADM

## 2019-05-30 RX ADMIN — SODIUM CHLORIDE 1000 ML: 900 INJECTION, SOLUTION INTRAVENOUS at 16:00

## 2019-05-30 RX ADMIN — Medication 20 ML: at 17:35

## 2019-05-30 RX ADMIN — Medication 10 ML: at 16:00

## 2019-05-30 RX ADMIN — ALTEPLASE 2 MG: 2.2 INJECTION, POWDER, LYOPHILIZED, FOR SOLUTION INTRAVENOUS at 17:02

## 2019-05-30 NOTE — PROGRESS NOTES
Arrived to the [facility]. Assessment completed. Patient tolerated IV fluids well. She also received cathflo 2 mg IV, as ordered. There was excellent blood return after waiting 30 minutes of the cathflo dwell time. Any issues or concerns during appointment: noted that there was no blood return from port today, but the port flushed easily. Cathflo was instilled, as ordered. Also, patient complained of left tonsil being sore. She states that it has been sore for the past couple of days. Informed Martell Chvaez and Arnel Doss, patient's navigator. Dr. Claudia Lee also came around to assess her neck. No new orders received at this time. Patient aware of next infusion appointment on 6/131/9 (date) at 56 (time) with IV infusion center. Discharged via W/C, with assistance of this R.N. Patient instructed to call Dr. Winsome Saha office immediately for any problems office for any problems or concerns. She verbalizes understanding.

## 2019-06-03 NOTE — PROGRESS NOTES
5/30/19  Saw patient with Dr. Esther Haskins. She was accompanied by her niece. She is worked in for a sick visit. She complains of increased weakness and anxiety, poor appetite. She denies nausea. Dr. Esther Haskins discussed nutrition with her and made recommendations. She may take up to 4 Ativan per day but only as needed. She was scheduled for supportive IV fluids today omitted Zofran. She was advised to call with unimproved or new symptoms. Navigation will continue to follow.

## 2019-06-13 ENCOUNTER — HOSPITAL ENCOUNTER (OUTPATIENT)
Dept: INFUSION THERAPY | Age: 79
Discharge: HOME | End: 2019-06-13
Payer: MEDICARE

## 2019-06-13 ENCOUNTER — PATIENT OUTREACH (OUTPATIENT)
Dept: CASE MANAGEMENT | Age: 79
End: 2019-06-13

## 2019-06-13 ENCOUNTER — HOSPITAL ENCOUNTER (OUTPATIENT)
Dept: LAB | Age: 79
Discharge: HOME | End: 2019-06-13
Payer: MEDICARE

## 2019-06-13 DIAGNOSIS — Z17.0 MALIGNANT NEOPLASM OF CENTRAL PORTION OF RIGHT BREAST IN FEMALE, ESTROGEN RECEPTOR POSITIVE (HCC): ICD-10-CM

## 2019-06-13 DIAGNOSIS — Z17.0 MALIGNANT NEOPLASM OF RIGHT BREAST IN FEMALE, ESTROGEN RECEPTOR POSITIVE, UNSPECIFIED SITE OF BREAST (HCC): Primary | ICD-10-CM

## 2019-06-13 DIAGNOSIS — E86.0 DEHYDRATION: ICD-10-CM

## 2019-06-13 DIAGNOSIS — C50.911 MALIGNANT NEOPLASM OF RIGHT BREAST IN FEMALE, ESTROGEN RECEPTOR POSITIVE, UNSPECIFIED SITE OF BREAST (HCC): Primary | ICD-10-CM

## 2019-06-13 DIAGNOSIS — R11.0 NAUSEA: ICD-10-CM

## 2019-06-13 DIAGNOSIS — C50.111 MALIGNANT NEOPLASM OF CENTRAL PORTION OF RIGHT BREAST IN FEMALE, ESTROGEN RECEPTOR POSITIVE (HCC): ICD-10-CM

## 2019-06-13 LAB
ALBUMIN SERPL-MCNC: 3.2 G/DL (ref 3.2–4.6)
ALBUMIN/GLOB SERPL: 1 {RATIO} (ref 1.2–3.5)
ALP SERPL-CCNC: 70 U/L (ref 50–136)
ALT SERPL-CCNC: 23 U/L (ref 12–65)
ANION GAP SERPL CALC-SCNC: 10 MMOL/L (ref 7–16)
AST SERPL-CCNC: 9 U/L (ref 15–37)
BASOPHILS # BLD: 0 K/UL (ref 0–0.2)
BASOPHILS NFR BLD: 0 % (ref 0–2)
BILIRUB SERPL-MCNC: 0.2 MG/DL (ref 0.2–1.1)
BUN SERPL-MCNC: 13 MG/DL (ref 8–23)
CALCIUM SERPL-MCNC: 8.9 MG/DL (ref 8.3–10.4)
CHLORIDE SERPL-SCNC: 106 MMOL/L (ref 98–107)
CO2 SERPL-SCNC: 25 MMOL/L (ref 21–32)
CREAT SERPL-MCNC: 0.83 MG/DL (ref 0.6–1)
DIFFERENTIAL METHOD BLD: ABNORMAL
EOSINOPHIL # BLD: 0 K/UL (ref 0–0.8)
EOSINOPHIL NFR BLD: 0 % (ref 0.5–7.8)
ERYTHROCYTE [DISTWIDTH] IN BLOOD BY AUTOMATED COUNT: 19.5 % (ref 11.9–14.6)
GLOBULIN SER CALC-MCNC: 3.3 G/DL (ref 2.3–3.5)
GLUCOSE SERPL-MCNC: 121 MG/DL (ref 65–100)
HCT VFR BLD AUTO: 32.2 % (ref 35.8–46.3)
HGB BLD-MCNC: 10.4 G/DL (ref 11.7–15.4)
IMM GRANULOCYTES # BLD AUTO: 0.1 K/UL (ref 0–0.5)
IMM GRANULOCYTES NFR BLD AUTO: 1 % (ref 0–5)
LYMPHOCYTES # BLD: 1.6 K/UL (ref 0.5–4.6)
LYMPHOCYTES NFR BLD: 15 % (ref 13–44)
MAGNESIUM SERPL-MCNC: 1.8 MG/DL (ref 1.8–2.4)
MCH RBC QN AUTO: 30.5 PG (ref 26.1–32.9)
MCHC RBC AUTO-ENTMCNC: 32.3 G/DL (ref 31.4–35)
MCV RBC AUTO: 94.4 FL (ref 79.6–97.8)
MONOCYTES # BLD: 0.6 K/UL (ref 0.1–1.3)
MONOCYTES NFR BLD: 5 % (ref 4–12)
NEUTS SEG # BLD: 8.7 K/UL (ref 1.7–8.2)
NEUTS SEG NFR BLD: 79 % (ref 43–78)
NRBC # BLD: 0 K/UL (ref 0–0.2)
PLATELET # BLD AUTO: 202 K/UL (ref 150–450)
PMV BLD AUTO: 9.8 FL (ref 9.4–12.3)
POTASSIUM SERPL-SCNC: 3.2 MMOL/L (ref 3.5–5.1)
PROT SERPL-MCNC: 6.5 G/DL (ref 6.3–8.2)
RBC # BLD AUTO: 3.41 M/UL (ref 4.05–5.25)
SODIUM SERPL-SCNC: 141 MMOL/L (ref 136–145)
WBC # BLD AUTO: 11.1 K/UL (ref 4.3–11.1)

## 2019-06-13 PROCEDURE — 96417 CHEMO IV INFUS EACH ADDL SEQ: CPT

## 2019-06-13 PROCEDURE — 96367 TX/PROPH/DG ADDL SEQ IV INF: CPT

## 2019-06-13 PROCEDURE — 74011000250 HC RX REV CODE- 250: Performed by: INTERNAL MEDICINE

## 2019-06-13 PROCEDURE — 36593 DECLOT VASCULAR DEVICE: CPT

## 2019-06-13 PROCEDURE — 96413 CHEMO IV INFUSION 1 HR: CPT

## 2019-06-13 PROCEDURE — 80053 COMPREHEN METABOLIC PANEL: CPT

## 2019-06-13 PROCEDURE — 74011250636 HC RX REV CODE- 250/636: Performed by: INTERNAL MEDICINE

## 2019-06-13 PROCEDURE — 83735 ASSAY OF MAGNESIUM: CPT

## 2019-06-13 PROCEDURE — 85025 COMPLETE CBC W/AUTO DIFF WBC: CPT

## 2019-06-13 PROCEDURE — 74011250636 HC RX REV CODE- 250/636

## 2019-06-13 PROCEDURE — 74011000258 HC RX REV CODE- 258: Performed by: INTERNAL MEDICINE

## 2019-06-13 PROCEDURE — 96375 TX/PRO/DX INJ NEW DRUG ADDON: CPT

## 2019-06-13 RX ORDER — ONDANSETRON 2 MG/ML
8 INJECTION INTRAMUSCULAR; INTRAVENOUS ONCE
Status: COMPLETED | OUTPATIENT
Start: 2019-06-13 | End: 2019-06-13

## 2019-06-13 RX ORDER — SODIUM CHLORIDE 9 MG/ML
25 INJECTION, SOLUTION INTRAVENOUS CONTINUOUS
Status: DISCONTINUED | OUTPATIENT
Start: 2019-06-13 | End: 2019-06-14 | Stop reason: HOSPADM

## 2019-06-13 RX ORDER — SODIUM CHLORIDE 0.9 % (FLUSH) 0.9 %
10 SYRINGE (ML) INJECTION AS NEEDED
Status: DISCONTINUED | OUTPATIENT
Start: 2019-06-13 | End: 2019-06-14 | Stop reason: HOSPADM

## 2019-06-13 RX ORDER — DEXAMETHASONE SODIUM PHOSPHATE 100 MG/10ML
10 INJECTION INTRAMUSCULAR; INTRAVENOUS ONCE
Status: COMPLETED | OUTPATIENT
Start: 2019-06-13 | End: 2019-06-13

## 2019-06-13 RX ADMIN — TRASTUZUMAB 512 MG: 150 INJECTION, POWDER, LYOPHILIZED, FOR SOLUTION INTRAVENOUS at 12:46

## 2019-06-13 RX ADMIN — SODIUM CHLORIDE 25 ML/HR: 900 INJECTION, SOLUTION INTRAVENOUS at 12:00

## 2019-06-13 RX ADMIN — ALTEPLASE 2 MG: 2.2 INJECTION, POWDER, LYOPHILIZED, FOR SOLUTION INTRAVENOUS at 11:13

## 2019-06-13 RX ADMIN — CARBOPLATIN 586 MG: 10 INJECTION, SOLUTION INTRAVENOUS at 15:33

## 2019-06-13 RX ADMIN — DOCETAXEL 149 MG: 10 INJECTION, SOLUTION INTRAVENOUS at 14:23

## 2019-06-13 RX ADMIN — Medication 40 ML: at 12:00

## 2019-06-13 RX ADMIN — SODIUM CHLORIDE 150 MG: 900 INJECTION, SOLUTION INTRAVENOUS at 13:35

## 2019-06-13 RX ADMIN — Medication 10 ML: at 16:08

## 2019-06-13 RX ADMIN — DEXAMETHASONE SODIUM PHOSPHATE 10 MG: 10 INJECTION INTRAMUSCULAR; INTRAVENOUS at 13:23

## 2019-06-13 RX ADMIN — ONDANSETRON 8 MG: 2 INJECTION INTRAMUSCULAR; INTRAVENOUS at 13:21

## 2019-06-13 NOTE — PROGRESS NOTES
Arrived to the [facility]. Assessment completed. Labs reviewed. Patient tolerated chemotherapy well. Any issues or concerns during appointment: noted potassium of 3.2. Call placed to Trent Mercado NP, and informed her of this. She states to inform patient that potassium supplement of 20 meQ po BID will be called in. Informed patient of this. Patient aware of next infusion appointment on 7/5/19 (date) at 56 (time) with IV infusion center. Discharged ambulatory, with [facility]. Patient instructed to call Dr. Adriana Terrell office immediately for any problems or concerns. She verbalizes understanding.

## 2019-06-13 NOTE — PROGRESS NOTES
6/13/2019 Saw the patient with Rudy Cleveland NP. She is due for cycle 3 of [address]. She is eating better with the Marinol. We encouraged her to take imodium for diarrhea. Encouraged her to call with questions and concerns.

## 2019-06-18 ENCOUNTER — PATIENT OUTREACH (OUTPATIENT)
Dept: CASE MANAGEMENT | Age: 79
End: 2019-06-18

## 2019-06-18 NOTE — PROGRESS NOTES
6/18/2019 Called [facility] and arranged for right breast ultrasound to evaluate treatment response. This will be done 6/25 at 1pm.  Faxed order to [phone].

## 2019-07-05 ENCOUNTER — HOSPITAL ENCOUNTER (OUTPATIENT)
Dept: LAB | Age: 79
Discharge: HOME | End: 2019-07-05
Payer: MEDICARE

## 2019-07-05 ENCOUNTER — HOSPITAL ENCOUNTER (OUTPATIENT)
Dept: INFUSION THERAPY | Age: 79
Discharge: HOME | End: 2019-07-05
Payer: MEDICARE

## 2019-07-05 ENCOUNTER — PATIENT OUTREACH (OUTPATIENT)
Dept: CASE MANAGEMENT | Age: 79
End: 2019-07-05

## 2019-07-05 DIAGNOSIS — E86.0 DEHYDRATION: Primary | ICD-10-CM

## 2019-07-05 DIAGNOSIS — C50.911 MALIGNANT NEOPLASM OF RIGHT BREAST IN FEMALE, ESTROGEN RECEPTOR POSITIVE, UNSPECIFIED SITE OF BREAST (HCC): ICD-10-CM

## 2019-07-05 DIAGNOSIS — Z17.0 MALIGNANT NEOPLASM OF RIGHT BREAST IN FEMALE, ESTROGEN RECEPTOR POSITIVE, UNSPECIFIED SITE OF BREAST (HCC): ICD-10-CM

## 2019-07-05 DIAGNOSIS — R11.0 NAUSEA: ICD-10-CM

## 2019-07-05 LAB
ALBUMIN SERPL-MCNC: 3.6 G/DL (ref 3.2–4.6)
ALBUMIN/GLOB SERPL: 1.2 {RATIO} (ref 1.2–3.5)
ALP SERPL-CCNC: 75 U/L (ref 50–136)
ALT SERPL-CCNC: 23 U/L (ref 12–65)
ANION GAP SERPL CALC-SCNC: 6 MMOL/L (ref 7–16)
AST SERPL-CCNC: 13 U/L (ref 15–37)
BASOPHILS # BLD: 0 K/UL (ref 0–0.2)
BASOPHILS NFR BLD: 0 % (ref 0–2)
BILIRUB SERPL-MCNC: 0.2 MG/DL (ref 0.2–1.1)
BUN SERPL-MCNC: 14 MG/DL (ref 8–23)
CALCIUM SERPL-MCNC: 9 MG/DL (ref 8.3–10.4)
CHLORIDE SERPL-SCNC: 106 MMOL/L (ref 98–107)
CO2 SERPL-SCNC: 27 MMOL/L (ref 21–32)
CREAT SERPL-MCNC: 0.9 MG/DL (ref 0.6–1)
DIFFERENTIAL METHOD BLD: ABNORMAL
EOSINOPHIL # BLD: 0 K/UL (ref 0–0.8)
EOSINOPHIL NFR BLD: 0 % (ref 0.5–7.8)
ERYTHROCYTE [DISTWIDTH] IN BLOOD BY AUTOMATED COUNT: 20 % (ref 11.9–14.6)
GLOBULIN SER CALC-MCNC: 3.1 G/DL (ref 2.3–3.5)
GLUCOSE SERPL-MCNC: 122 MG/DL (ref 65–100)
HCT VFR BLD AUTO: 33.4 % (ref 35.8–46.3)
HGB BLD-MCNC: 11 G/DL (ref 11.7–15.4)
IMM GRANULOCYTES # BLD AUTO: 0.1 K/UL (ref 0–0.5)
IMM GRANULOCYTES NFR BLD AUTO: 1 % (ref 0–5)
LYMPHOCYTES # BLD: 2.3 K/UL (ref 0.5–4.6)
LYMPHOCYTES NFR BLD: 23 % (ref 13–44)
MAGNESIUM SERPL-MCNC: 1.7 MG/DL (ref 1.8–2.4)
MCH RBC QN AUTO: 32 PG (ref 26.1–32.9)
MCHC RBC AUTO-ENTMCNC: 32.9 G/DL (ref 31.4–35)
MCV RBC AUTO: 97.1 FL (ref 79.6–97.8)
MONOCYTES # BLD: 0.5 K/UL (ref 0.1–1.3)
MONOCYTES NFR BLD: 5 % (ref 4–12)
NEUTS SEG # BLD: 7 K/UL (ref 1.7–8.2)
NEUTS SEG NFR BLD: 72 % (ref 43–78)
NRBC # BLD: 0 K/UL (ref 0–0.2)
PLATELET # BLD AUTO: 176 K/UL (ref 150–450)
PMV BLD AUTO: 9.6 FL (ref 9.4–12.3)
POTASSIUM SERPL-SCNC: 3.2 MMOL/L (ref 3.5–5.1)
PROT SERPL-MCNC: 6.7 G/DL (ref 6.3–8.2)
RBC # BLD AUTO: 3.44 M/UL (ref 4.05–5.25)
SODIUM SERPL-SCNC: 139 MMOL/L (ref 136–145)
WBC # BLD AUTO: 9.8 K/UL (ref 4.3–11.1)

## 2019-07-05 PROCEDURE — 85025 COMPLETE CBC W/AUTO DIFF WBC: CPT

## 2019-07-05 PROCEDURE — 74011000258 HC RX REV CODE- 258: Performed by: INTERNAL MEDICINE

## 2019-07-05 PROCEDURE — 83735 ASSAY OF MAGNESIUM: CPT

## 2019-07-05 PROCEDURE — 96417 CHEMO IV INFUS EACH ADDL SEQ: CPT

## 2019-07-05 PROCEDURE — 74011250636 HC RX REV CODE- 250/636: Performed by: INTERNAL MEDICINE

## 2019-07-05 PROCEDURE — 96367 TX/PROPH/DG ADDL SEQ IV INF: CPT

## 2019-07-05 PROCEDURE — 96375 TX/PRO/DX INJ NEW DRUG ADDON: CPT

## 2019-07-05 PROCEDURE — 96413 CHEMO IV INFUSION 1 HR: CPT

## 2019-07-05 PROCEDURE — 74011250636 HC RX REV CODE- 250/636

## 2019-07-05 PROCEDURE — 80053 COMPREHEN METABOLIC PANEL: CPT

## 2019-07-05 RX ORDER — MAGNESIUM SULFATE 1 G/100ML
1 INJECTION INTRAVENOUS ONCE
Status: COMPLETED | OUTPATIENT
Start: 2019-07-05 | End: 2019-07-05

## 2019-07-05 RX ORDER — ONDANSETRON 2 MG/ML
8 INJECTION INTRAMUSCULAR; INTRAVENOUS ONCE
Status: COMPLETED | OUTPATIENT
Start: 2019-07-05 | End: 2019-07-05

## 2019-07-05 RX ORDER — SODIUM CHLORIDE 9 MG/ML
10 INJECTION INTRAMUSCULAR; INTRAVENOUS; SUBCUTANEOUS AS NEEDED
Status: ACTIVE | OUTPATIENT
Start: 2019-07-05 | End: 2019-07-05

## 2019-07-05 RX ORDER — DEXAMETHASONE SODIUM PHOSPHATE 100 MG/10ML
10 INJECTION INTRAMUSCULAR; INTRAVENOUS ONCE
Status: COMPLETED | OUTPATIENT
Start: 2019-07-05 | End: 2019-07-05

## 2019-07-05 RX ORDER — SODIUM CHLORIDE 9 MG/ML
25 INJECTION, SOLUTION INTRAVENOUS CONTINUOUS
Status: ACTIVE | OUTPATIENT
Start: 2019-07-05 | End: 2019-07-05

## 2019-07-05 RX ADMIN — ONDANSETRON 8 MG: 2 INJECTION INTRAMUSCULAR; INTRAVENOUS at 10:44

## 2019-07-05 RX ADMIN — SODIUM CHLORIDE 150 MG: 900 INJECTION, SOLUTION INTRAVENOUS at 10:55

## 2019-07-05 RX ADMIN — MAGNESIUM SULFATE HEPTAHYDRATE 1 G: 1 INJECTION, SOLUTION INTRAVENOUS at 11:25

## 2019-07-05 RX ADMIN — SODIUM CHLORIDE 25 ML/HR: 900 INJECTION, SOLUTION INTRAVENOUS at 11:15

## 2019-07-05 RX ADMIN — SODIUM CHLORIDE 10 ML: 9 INJECTION INTRAMUSCULAR; INTRAVENOUS; SUBCUTANEOUS at 10:38

## 2019-07-05 RX ADMIN — CARBOPLATIN 545 MG: 10 INJECTION, SOLUTION INTRAVENOUS at 13:35

## 2019-07-05 RX ADMIN — DOCETAXEL 149 MG: 10 INJECTION, SOLUTION INTRAVENOUS at 12:26

## 2019-07-05 RX ADMIN — SODIUM CHLORIDE 10 ML: 9 INJECTION INTRAMUSCULAR; INTRAVENOUS; SUBCUTANEOUS at 14:12

## 2019-07-05 RX ADMIN — TRASTUZUMAB 512 MG: 150 INJECTION, POWDER, LYOPHILIZED, FOR SOLUTION INTRAVENOUS at 11:20

## 2019-07-05 RX ADMIN — DEXAMETHASONE SODIUM PHOSPHATE 10 MG: 10 INJECTION INTRAMUSCULAR; INTRAVENOUS at 10:48

## 2019-07-05 NOTE — PROGRESS NOTES
Arrived to infusion after [facility] visit  No concerns voiced  Herceptin, taxotere,carboplatin, and mg 1gm infused  Tolerated well. Dr Jessica Rader aware of K 3.2 today.  Will continue  Oral potassium  Next appt 7/26

## 2019-07-26 ENCOUNTER — HOSPITAL ENCOUNTER (OUTPATIENT)
Dept: LAB | Age: 79
Discharge: HOME | End: 2019-07-26
Payer: MEDICARE

## 2019-07-26 ENCOUNTER — HOSPITAL ENCOUNTER (OUTPATIENT)
Dept: INFUSION THERAPY | Age: 79
Discharge: HOME | End: 2019-07-26
Payer: MEDICARE

## 2019-07-26 DIAGNOSIS — C50.911 MALIGNANT NEOPLASM OF RIGHT BREAST IN FEMALE, ESTROGEN RECEPTOR POSITIVE, UNSPECIFIED SITE OF BREAST (HCC): Primary | ICD-10-CM

## 2019-07-26 DIAGNOSIS — Z17.0 MALIGNANT NEOPLASM OF RIGHT BREAST IN FEMALE, ESTROGEN RECEPTOR POSITIVE, UNSPECIFIED SITE OF BREAST (HCC): Primary | ICD-10-CM

## 2019-07-26 DIAGNOSIS — C50.911 MALIGNANT NEOPLASM OF RIGHT BREAST IN FEMALE, ESTROGEN RECEPTOR POSITIVE, UNSPECIFIED SITE OF BREAST (HCC): ICD-10-CM

## 2019-07-26 DIAGNOSIS — Z17.0 MALIGNANT NEOPLASM OF RIGHT BREAST IN FEMALE, ESTROGEN RECEPTOR POSITIVE, UNSPECIFIED SITE OF BREAST (HCC): ICD-10-CM

## 2019-07-26 LAB
ALBUMIN SERPL-MCNC: 3.7 G/DL (ref 3.2–4.6)
ALBUMIN/GLOB SERPL: 1.2 {RATIO} (ref 1.2–3.5)
ALP SERPL-CCNC: 70 U/L (ref 50–136)
ALT SERPL-CCNC: 21 U/L (ref 12–65)
ANION GAP SERPL CALC-SCNC: 8 MMOL/L (ref 7–16)
AST SERPL-CCNC: 9 U/L (ref 15–37)
BASOPHILS # BLD: 0 K/UL (ref 0–0.2)
BASOPHILS NFR BLD: 0 % (ref 0–2)
BILIRUB SERPL-MCNC: 0.2 MG/DL (ref 0.2–1.1)
BUN SERPL-MCNC: 17 MG/DL (ref 8–23)
CALCIUM SERPL-MCNC: 9.2 MG/DL (ref 8.3–10.4)
CHLORIDE SERPL-SCNC: 108 MMOL/L (ref 98–107)
CO2 SERPL-SCNC: 24 MMOL/L (ref 21–32)
CREAT SERPL-MCNC: 0.89 MG/DL (ref 0.6–1)
DIFFERENTIAL METHOD BLD: ABNORMAL
EOSINOPHIL # BLD: 0 K/UL (ref 0–0.8)
EOSINOPHIL NFR BLD: 0 % (ref 0.5–7.8)
ERYTHROCYTE [DISTWIDTH] IN BLOOD BY AUTOMATED COUNT: 19.7 % (ref 11.9–14.6)
GLOBULIN SER CALC-MCNC: 3.1 G/DL (ref 2.3–3.5)
GLUCOSE SERPL-MCNC: 110 MG/DL (ref 65–100)
HCT VFR BLD AUTO: 33.7 % (ref 35.8–46.3)
HGB BLD-MCNC: 10.9 G/DL (ref 11.7–15.4)
IMM GRANULOCYTES # BLD AUTO: 0.1 K/UL (ref 0–0.5)
IMM GRANULOCYTES NFR BLD AUTO: 1 % (ref 0–5)
LYMPHOCYTES # BLD: 2.7 K/UL (ref 0.5–4.6)
LYMPHOCYTES NFR BLD: 24 % (ref 13–44)
MAGNESIUM SERPL-MCNC: 1.7 MG/DL (ref 1.8–2.4)
MCH RBC QN AUTO: 32.7 PG (ref 26.1–32.9)
MCHC RBC AUTO-ENTMCNC: 32.3 G/DL (ref 31.4–35)
MCV RBC AUTO: 101.2 FL (ref 79.6–97.8)
MONOCYTES # BLD: 0.7 K/UL (ref 0.1–1.3)
MONOCYTES NFR BLD: 6 % (ref 4–12)
NEUTS SEG # BLD: 7.8 K/UL (ref 1.7–8.2)
NEUTS SEG NFR BLD: 69 % (ref 43–78)
NRBC # BLD: 0 K/UL (ref 0–0.2)
PLATELET # BLD AUTO: 147 K/UL (ref 150–450)
PMV BLD AUTO: 9.6 FL (ref 9.4–12.3)
POTASSIUM SERPL-SCNC: 3.4 MMOL/L (ref 3.5–5.1)
PROT SERPL-MCNC: 6.8 G/DL (ref 6.3–8.2)
RBC # BLD AUTO: 3.33 M/UL (ref 4.05–5.25)
SODIUM SERPL-SCNC: 140 MMOL/L (ref 136–145)
WBC # BLD AUTO: 11.3 K/UL (ref 4.3–11.1)

## 2019-07-26 PROCEDURE — 80053 COMPREHEN METABOLIC PANEL: CPT

## 2019-07-26 PROCEDURE — 85025 COMPLETE CBC W/AUTO DIFF WBC: CPT

## 2019-07-26 PROCEDURE — 96367 TX/PROPH/DG ADDL SEQ IV INF: CPT

## 2019-07-26 PROCEDURE — 74011000258 HC RX REV CODE- 258: Performed by: INTERNAL MEDICINE

## 2019-07-26 PROCEDURE — 96417 CHEMO IV INFUS EACH ADDL SEQ: CPT

## 2019-07-26 PROCEDURE — 96375 TX/PRO/DX INJ NEW DRUG ADDON: CPT

## 2019-07-26 PROCEDURE — 74011250636 HC RX REV CODE- 250/636

## 2019-07-26 PROCEDURE — 83735 ASSAY OF MAGNESIUM: CPT

## 2019-07-26 PROCEDURE — 74011250636 HC RX REV CODE- 250/636: Performed by: INTERNAL MEDICINE

## 2019-07-26 PROCEDURE — 96413 CHEMO IV INFUSION 1 HR: CPT

## 2019-07-26 RX ORDER — SODIUM CHLORIDE 0.9 % (FLUSH) 0.9 %
10 SYRINGE (ML) INJECTION AS NEEDED
Status: ACTIVE | OUTPATIENT
Start: 2019-07-26 | End: 2019-07-26

## 2019-07-26 RX ORDER — SODIUM CHLORIDE 9 MG/ML
25 INJECTION, SOLUTION INTRAVENOUS CONTINUOUS
Status: ACTIVE | OUTPATIENT
Start: 2019-07-26 | End: 2019-07-26

## 2019-07-26 RX ORDER — ONDANSETRON 2 MG/ML
8 INJECTION INTRAMUSCULAR; INTRAVENOUS ONCE
Status: COMPLETED | OUTPATIENT
Start: 2019-07-26 | End: 2019-07-26

## 2019-07-26 RX ORDER — DEXAMETHASONE SODIUM PHOSPHATE 100 MG/10ML
10 INJECTION INTRAMUSCULAR; INTRAVENOUS ONCE
Status: COMPLETED | OUTPATIENT
Start: 2019-07-26 | End: 2019-07-26

## 2019-07-26 RX ADMIN — ONDANSETRON 8 MG: 2 INJECTION INTRAMUSCULAR; INTRAVENOUS at 11:12

## 2019-07-26 RX ADMIN — SODIUM CHLORIDE 150 MG: 900 INJECTION, SOLUTION INTRAVENOUS at 11:16

## 2019-07-26 RX ADMIN — Medication 10 ML: at 13:50

## 2019-07-26 RX ADMIN — Medication 10 ML: at 10:58

## 2019-07-26 RX ADMIN — DEXAMETHASONE SODIUM PHOSPHATE 10 MG: 10 INJECTION INTRAMUSCULAR; INTRAVENOUS at 11:13

## 2019-07-26 RX ADMIN — TRASTUZUMAB 512 MG: 150 INJECTION, POWDER, LYOPHILIZED, FOR SOLUTION INTRAVENOUS at 11:44

## 2019-07-26 RX ADMIN — DOCETAXEL 149 MG: 10 INJECTION, SOLUTION INTRAVENOUS at 12:17

## 2019-07-26 RX ADMIN — SODIUM CHLORIDE 25 ML/HR: 900 INJECTION, SOLUTION INTRAVENOUS at 10:58

## 2019-07-26 RX ADMIN — CARBOPLATIN 538 MG: 10 INJECTION, SOLUTION INTRAVENOUS at 13:18

## 2019-07-26 NOTE — PROGRESS NOTES
Arrived to the [facility]. Chemotherapy treatment completed as per orders. Patient tolerated well. Any issues or concerns during appointment: none. Patient aware of next infusion appointment on 8/16 (date) at 10:00 AM (time). Discharged ambulatory.

## 2019-07-26 NOTE — PROGRESS NOTES
Massage THERAPY: Daily Note    Referring Physician: Nathalie Puentes MD  Medical/Referring Diagnosis: Malignant neoplasm of unspecified site of unspecified female breast [C50.919]   Precautions/Allergies: Betadine [povidone-iodine]; Pcn [penicillins]; and Pentasa [mesalamine]  SUBJECTIVE:  Present Symptoms: Cold hands     Pre-Treatment Pain: 1/10   Neuropathy Scale:  1/10  Past Medical History:    Ms. Yolanda Snow  has a past medical history of Anxiety, Cancer ([facility] [address] Cancer of right breast ([facility] [address] (Dx 04/2019), Crohn's disease ([facility] [address] Hypertension, Psychiatric disorder, and Thyroid disease. Ms. Yolanda Snow  has a past surgical history that includes hx hysterectomy; hx lumbar laminectomy; and ir insert tunl cvc w port over 5 years (4/18/2019). Current Medications:       Current Outpatient Medications:     venlafaxine-SR (EFFEXOR-XR) 75 mg capsule, , Disp: , Rfl:     LORazepam (ATIVAN) 1 mg tablet, Take 1 mg by mouth., Disp: , Rfl:     dronabinol (MARINOL) 5 mg capsule, Take 1 Cap by mouth two (2) times daily as needed for Nausea. Max Daily Amount: 10 mg., Disp: 60 Cap, Rfl: 2    potassium chloride (K-DUR, KLOR-CON) 20 mEq tablet, Take 1 Tab by mouth two (2) times a day., Disp: 60 Tab, Rfl: 2    dexamethasone (DECADRON) 4 mg tablet, Take two (2) tabs twice daily the day before, day of and day after chemo, Disp: 72 Tab, Rfl: 1    ondansetron hcl (ZOFRAN) 8 mg tablet, Take 1 Tab by mouth every eight (8) hours as needed for Nausea., Disp: 90 Tab, Rfl: 2    lidocaine-prilocaine (EMLA) topical cream, Apply  to affected area as needed for Pain. Apply to port site 45-60 minutes before lab appointment, Disp: 30 g, Rfl: 2    venlafaxine-SR (EFFEXOR XR) 150 mg capsule, Take  by mouth daily. , Disp: , Rfl:     amLODIPine (NORVASC) 10 mg tablet, Take  by mouth daily. , Disp: , Rfl:     hydrALAZINE (APRESOLINE) 25 mg tablet, Take 25 mg by mouth four (4) times daily. , Disp: , Rfl:     losartan (COZAAR) 50 mg tablet, Take  by mouth daily., Disp: , Rfl:     hydroCHLOROthiazide (HYDRODIURIL) 25 mg tablet, Take 25 mg by mouth daily. , Disp: , Rfl:     metoprolol tartrate (LOPRESSOR) 25 mg tablet, Take  by mouth two (2) times a day., Disp: , Rfl:     levothyroxine (SYNTHROID) 75 mcg tablet, Take  by mouth Daily (before breakfast). , Disp: , Rfl:     esomeprazole (NEXIUM) 40 mg capsule, Take  by mouth daily. , Disp: , Rfl:     oxybutynin chloride XL (DITROPAN XL) 10 mg CR tablet, Take 10 mg by mouth daily. , Disp: , Rfl:     primidone (MYSOLINE) 50 mg tablet, Take 100 mg by mouth two (2) times a day., Disp: , Rfl:     melatonin 3 mg tablet, Take 6 mg by mouth nightly., Disp: , Rfl:     OLANZapine (ZYPREXA) 2.5 mg tablet, Take 2.5 mg by mouth daily. , Disp: , Rfl:     OLANZapine (ZYPREXA) 5 mg tablet, Take 5 mg by mouth two (2) times a day., Disp: , Rfl:     Current Facility-Administered Medications:     0.9% sodium chloride infusion, 25 mL/hr, IntraVENous, CONTINUOUS, Ivan Lima MD, Last Rate: 25 mL/hr at 07/26/19 1058, 25 mL/hr at 07/26/19 1058    DOCEtaxel (TAXOTERE) 149 mg in 0.9% sodium chloride 250 mL chemo infusion, 75 mg/m2 (Treatment Plan Recorded), IntraVENous, ONCE, Ivan Lima MD, Last Rate: 290 mL/hr at 07/26/19 1217, 149 mg at 07/26/19 1217    CARBOplatin (PARAPLATIN) 538 mg in 0.9% sodium chloride 250 mL chemo infusion, 538 mg, IntraVENous, ONCE, Ivan Lima MD    saline peripheral flush soln 10 mL, 10 mL, InterCATHeter, PRN, Ivan Lima MD, 10 mL at 07/26/19 1058       OBJECTIVE/ASSESSMENT:  Observations of Patient:  No issues related to massage  Response To Treatment: \"Feels good, especially the warmth\"   Post-Treatment Pain: 1/10   Neuropathy Scale:  1/10  TREATMENT:    (In addition to Assessment/Re-Assessment sessions the following treatments were rendered)  Treatment Provided:  [x]  Soft tissue massage  []  Healing Touch   Location: forearm(s) bilaterally and hand(s) bilaterally  Patient Position: Seated  Time: 15 minutes    PLAN OF CARE:    []  I will follow up with this patient as needed. [x]  No follow up visit necessary.     Thank you for this referral.  Val Oates LMT

## 2019-08-07 ENCOUNTER — HOSPITAL ENCOUNTER (OUTPATIENT)
Dept: PHYSICAL THERAPY | Age: 79
Discharge: HOME | End: 2019-08-07
Attending: INTERNAL MEDICINE
Payer: MEDICARE

## 2019-08-07 DIAGNOSIS — Z17.0 MALIGNANT NEOPLASM OF CENTRAL PORTION OF RIGHT BREAST IN FEMALE, ESTROGEN RECEPTOR POSITIVE (HCC): ICD-10-CM

## 2019-08-07 DIAGNOSIS — C50.111 MALIGNANT NEOPLASM OF CENTRAL PORTION OF RIGHT BREAST IN FEMALE, ESTROGEN RECEPTOR POSITIVE (HCC): ICD-10-CM

## 2019-08-07 PROCEDURE — 97161 PT EVAL LOW COMPLEX 20 MIN: CPT

## 2019-08-07 NOTE — THERAPY EVALUATION
Shari Burnett  : 1940  [place]  Secondary: Vibha Mckeon 60. at [facility]  [address], [address], [place] [address]  [phone]   [phone]          OUTPATIENT PHYSICAL THERAPY:Initial Assessment 2019   ICD-10: Treatment Diagnosis: muscle weakness generalized M62.81  Precautions/Allergies:   Betadine [povidone-iodine]; Pcn [penicillins]; and Pentasa [mesalamine]    TREATMENT PLAN:  Effective Dates: 2019 TO 2019 (90 days). Frequency/Duration: 2 times a week for 90 Day(s) MEDICAL/REFERRING DIAGNOSIS:  Malignant neoplasm of central portion of right breast in female, estrogen receptor positive [facility] [C50.111, Z17.0]    DATE OF ONSET:   REFERRING PHYSICIAN: Divina Hurst MD MD Orders: oncology rehab  Return MD Appointment: 19     INITIAL ASSESSMENT:  Ms. Jacoby Mckinnon presents following treatment for breast cancer. She has had a diagnosis of severe anxiety for 2 years prior to cancer diagnosis. She has developed tremors also. She will be seeing a new neurologist about her anxiety and tremors. She is awaiting an appointment. She is weak and deconditioned due to recent treatment for breast cancer. She will benefit from therapeutic exercises. PROBLEM LIST (Impacting functional limitations):  1. Decreased Strength  2. Decreased Activity Tolerance  3. Increased Fatigue  4. Decreased [place] with Home Exercise Program INTERVENTIONS PLANNED: (Treatment may consist of any combination of the following)  1. Home Exercise Program (HEP)  2. Therapeutic Exercise/Strengthening     GOALS: (Goals have been discussed and agreed upon with patient.)  Short-Term Functional Goals: Time Frame: 4 weeks  1. The patient will be independent with HEP for strength within 4 weeks. 2. The patient will gain 1/2 grade strength of all 4 extremities within 4 weeks.   3. The patient will improve her TUG by 1 second within 4 weeks.  4. The patient will report a fatigue level of 7 or less within 4 weeks. Discharge Goals: Time Frame: 8 weeks  1. The patient will improve her DASH by 5 within 8 weeks. 2. The patient will improve her TUG by 2 seconds within 8 weeks. 3. The patient will improve her 6 minute walk distance by 100' x 1 within 8 weeks. 4. The patient will report a fatigue level of 5 or less within 8 weeks. OUTCOME MEASURE:   Tool Used: 6-MINUTE WALK TEST  Score:  Initial: 975 feet Most Recent: X feet (Date: -- )   Interpretation of Score: Normal range varies but is approximately 6876-3591 Feet      Distance walked: 975 feet               Baseline End of Test   Heart Rate 86 97   Dyspnea (Luana Scale)     Fatigue (Luana Scale) 9 10   SpO2 96 97   /76 149/78         Tool Used: Disabilities of the Arm, Shoulder and Hand (DASH) Questionnaire - Quick Version  Score:  Initial: 18/55  Most Recent: X/55 (Date: -- )   Interpretation of Score: The DASH is designed to measure the activities of daily living in person's with upper extremity dysfunction or pain. Each section is scored on a 1-5 scale, 5 representing the greatest disability. The scores of each section are added together for a total score of 55. Tool Used: TINETTI  Score:  Initial:   Gait: 12/12  Balance: 16/16  TOTAL: 28/28 Most Recent:  Gait: /12  Balance: /16  TOTAL: /28   Interpretation of Score: The maximum score for the gait component is 12 points. The maximum score for the balance component is 16 points. The maximum total score is 28 points. In general, patients who score below 19 are at a high risk for falls. Patients who score in the range of 19-24 indicate that the patient has a risk for falls. Tool Used: Timed Up and Go (TUG)  Score:  Initial: 10 seconds Most Recent: X seconds (Date: -- )   Interpretation of Score:  The test measures, in seconds, the time taken by an individual to stand up from a standard arm chair (seat height 46 cm [18 in], arm height 65 cm [25.6 in]), walk a distance of 3 meters (118 in, approx 10 ft), turn, walk back to the chair and sit down. If the individual takes longer than 14 seconds to complete TUG, this indicates risk for falls. Tool Used: ECOG Performance Survey Score  Score:  Initial: 2 Most Recent:      Interpretation of Score:   0 Fully active, able to carry on all pre-disease performance without restriction   1 Restricted in physically strenuous activity but ambulatory and able to carry out work of a light or sedentary nature, e.g., light house work, office work   2 Ambulatory and capable of all selfcare but unable to carry out any work activities. Up and about more than 50% of waking hours   3 Capable of only limited selfcare, confined to bed or chair more than 50% of waking hours   4 Completely disabled. Cannot carry on any selfcare. Totally confined to bed or chair   5 Dead      Sit to stand x 10 O2 95   MEDICAL NECESSITY:   · Patient is expected to demonstrate progress in strength, balance and conditioning to increase independence with household activity. REASON FOR SERVICES/OTHER COMMENTS:  · Patient continues to demonstrate capacity to improve strength, balance and conditioning which will increase independence. Total Duration:  PT Patient Time In/Time Out  Time In: 1405    Rehabilitation Potential For Stated Goals: Good  Regarding Chana Edge's therapy, I certify that the treatment plan above will be carried out by a therapist or under their direction. Thank you for this referral,  Angelica Mcleod, PT          PAIN/SUBJECTIVE:   Initial: Pain Intensity 1: 0   Post Session:  0/10   HISTORY:   History of Injury/Illness (Reason for Referral):   Anxiety, tremors, chemo, deconditioned, falls  Past Medical History/Comorbidities:   Ms. Emilee Silver  has a past medical history of Anxiety, Cancer ([facility] [address] Cancer of right breast ([facility] [address] (Dx 04/2019), Crohn's disease ([facility] [address] Hypertension, Psychiatric disorder, and Thyroid disease. Ms. Salvador Coates  has a past surgical history that includes hx hysterectomy; hx lumbar laminectomy; and ir insert tunl cvc w port over 5 years (4/18/2019). Past Medical History:   Diagnosis Date    Anxiety     Cancer [facility])     melanoma    Cancer [facility] right breast ([facility]) Dx 04/2019    Crohn's disease ([facility])     Hypertension     Psychiatric disorder     anxiety/depression    Thyroid disease      Past Surgical History:   Procedure Laterality Date    HX HYSTERECTOMY      HX LUMBAR LAMINECTOMY      IR INSERT TUNL CVC W PORT OVER 5 YEARS  4/18/2019       Social History/Living Environment:     lives with niece  Prior Level of Function/Work/Activity:  Has lived with niece 2 years due to significant anxiety  Dominant Side:         RIGHT   Ambulatory/Rehab Services H2 Model Falls Risk Assessment   Risk Factors:       (5)  History of Recent Falls [w/in 3 months] Ability to Rise from Chair:       (0)  Ability to rise in a single movement   Falls Prevention Plan:       No modifications necessary   Total: (5 or greater = High Risk): 5   ©2010 [facility]. All Rights Reserved. [facility] Patent #[id number]. Federal Law prohibits the replication, distribution or use without written permission from [facility]   Current Medications:       Current Outpatient Medications:     venlafaxine-SR (EFFEXOR-XR) 75 mg capsule, , Disp: , Rfl:     LORazepam (ATIVAN) 1 mg tablet, Take 1 mg by mouth., Disp: , Rfl:     dronabinol (MARINOL) 5 mg capsule, Take 1 Cap by mouth two (2) times daily as needed for Nausea.  Max Daily Amount: 10 mg., Disp: 60 Cap, Rfl: 2    potassium chloride (K-DUR, KLOR-CON) 20 mEq tablet, Take 1 Tab by mouth two (2) times a day., Disp: 60 Tab, Rfl: 2    dexamethasone (DECADRON) 4 mg tablet, Take two (2) tabs twice daily the day before, day of and day after chemo, Disp: 72 Tab, Rfl: 1    ondansetron hcl (ZOFRAN) 8 mg tablet, Take 1 Tab by mouth every eight (8) hours as needed for Nausea., Disp: 90 Tab, Rfl: 2    lidocaine-prilocaine (EMLA) topical cream, Apply  to affected area as needed for Pain. Apply to port site 45-60 minutes before lab appointment, Disp: 30 g, Rfl: 2    venlafaxine-SR (EFFEXOR XR) 150 mg capsule, Take  by mouth daily. , Disp: , Rfl:     amLODIPine (NORVASC) 10 mg tablet, Take  by mouth daily. , Disp: , Rfl:     hydrALAZINE (APRESOLINE) 25 mg tablet, Take 25 mg by mouth four (4) times daily. , Disp: , Rfl:     losartan (COZAAR) 50 mg tablet, Take  by mouth daily. , Disp: , Rfl:     hydroCHLOROthiazide (HYDRODIURIL) 25 mg tablet, Take 25 mg by mouth daily. , Disp: , Rfl:     metoprolol tartrate (LOPRESSOR) 25 mg tablet, Take  by mouth two (2) times a day., Disp: , Rfl:     levothyroxine (SYNTHROID) 75 mcg tablet, Take  by mouth Daily (before breakfast). , Disp: , Rfl:     esomeprazole (NEXIUM) 40 mg capsule, Take  by mouth daily. , Disp: , Rfl:     oxybutynin chloride XL (DITROPAN XL) 10 mg CR tablet, Take 10 mg by mouth daily. , Disp: , Rfl:     primidone (MYSOLINE) 50 mg tablet, Take 100 mg by mouth two (2) times a day., Disp: , Rfl:     melatonin 3 mg tablet, Take 6 mg by mouth nightly., Disp: , Rfl:     OLANZapine (ZYPREXA) 2.5 mg tablet, Take 2.5 mg by mouth daily. , Disp: , Rfl:     OLANZapine (ZYPREXA) 5 mg tablet, Take 5 mg by mouth two (2) times a day., Disp: , Rfl:    Date Last Reviewed:  8/7/19   Number of Personal Factors/Comorbidities that affect the Plan of Care: 1-2: MODERATE COMPLEXITY   EXAMINATION:   ROM:          within functional limits  Strength:          Grossly 4/5 x 4 extremties  Functional Mobility:         Gait/Ambulation:  independent        Transfers:  independent  Balance:          mildly unsteady with gait   Body Structures Involved:  1. Muscles Body Functions Affected:  1. Neuromusculoskeletal Activities and Participation Affected:  1.  None   Number of elements (examined above) that affect the Plan of Care: 1-2: LOW COMPLEXITY CLINICAL PRESENTATION:   Presentation: Stable and uncomplicated: LOW COMPLEXITY   CLINICAL DECISION MAKING:   Use of outcome tool(s) and clinical judgement create a POC that gives a: Clear prediction of patient's progress: LOW COMPLEXITY

## 2019-08-12 ENCOUNTER — HOSPITAL ENCOUNTER (OUTPATIENT)
Dept: PHYSICAL THERAPY | Age: 79
Discharge: HOME | End: 2019-08-12
Attending: INTERNAL MEDICINE
Payer: MEDICARE

## 2019-08-12 PROCEDURE — 97110 THERAPEUTIC EXERCISES: CPT

## 2019-08-12 NOTE — PROGRESS NOTES
Franca Nicole  : 1940  [place]  Secondary: Vibha Mckeon 60. at [facility]  [address], [address], [place] [address]  [phone]   [phone]        OUTPATIENT PHYSICAL THERAPY: Daily Treatment Note 2019  Visit Count:  2       ICD-10: Treatment Diagnosis: muscle weakness generalized M62.81  Precautions/Allergies:   Betadine [povidone-iodine]; Pcn [penicillins]; and Pentasa [mesalamine]    TREATMENT PLAN:  Effective Dates: 2019 TO 2019 (90 days). Frequency/Duration: 2 times a week for 90 Day(s)       Pre-treatment Symptoms/Complaints:  The patient reports she has diarrhea from chemo. Pain: Initial: Pain Intensity 1: 0  /10 Post Session:  0/10   Medications Last Reviewed:  2019  Updated Objective Findings:  None Today  TREATMENT:     THERAPEUTIC EXERCISE: (43 minutes):  Exercises per grid below to improve mobility, strength and balance. Required minimal verbal cues to promote proper body alignment. Progressed resistance as indicated.   Activity  Date   19 Date    Date    Date  Date  Date  Date    Fatigue Score   weekly  7  8         Resting BP  138/87         Heart Rate   Before   After    91  106            O2 level Before   After    98  98            Machines  Wt/   sets/   reps  Wt/   sets/   reps  Wt/   sets/   reps  Wt/   sets/   reps  Wt/   sets/   reps  Wt/   sets/   reps  Wt/   sets/   reps    Leg Press            Leg Extension   x 10 reps with 5 count hold         Leg Curl            Dips/Pull Ups            Overhead Press   x 10 with 2#         Compound Row            Low Back Ext            Cardiovascular  Time   Intensity, etc  Time   Intensity, etc  Time   Intensity, etc  Time   Intensity, etc  Time   Intensity, etc  Time   Intensity, etc  Time   Intensity, etc    Upper body ergometer  Level 1 x 4 min          [employer]            Recumbent Bike            Treadmill Nustep   level 1 x 5 min         Elliptical            Other Exercises/   Activities  Parameters  Parameters  Parameters  Parameters  Parameters  Parameters  Parameters    Gastroc stretch on incline board            Sit to stand   x 10 reps         Up on toes, hip abduction, hip extension, hamstring curls  x 10 reps         Hip adduction with inflated ball, hip abduction and hamstring curls with green theraband x 10 reps in sitting  Scapular retraction with green theraband x 10 reps in sitting  Wall push ups x 10 reps  Biceps curls, overhead press, abduction, flexion x 10 reps with 2#  Chair push ups x 10 reps  MedBridge Portal  Treatment/Session Summary:    · Response to Treatment:  tolerated the treatment well. .  · Communication/Consultation:  practice sit to stand  · Equipment provided today:  None today  · Recommendations/Intent for next treatment session: Next visit will focus on strength, conditioning and balance.     Total Treatment Billable Duration:  43 minutes  PT Patient Time In/Time Out  Time In: 1430  Time Out: 7098  Jarred Up, PT    Future Appointments   Date Time Provider Milla Betancur   8/14/2019  3:45 PM Harsha, [facility] D, PT [facility]   8/16/2019  8:20 AM [facility] OUTREACH INSURANCE [facility] [address]   8/16/2019  8:45 AM Ade Lentz MD [facility]   8/16/2019 10:00 AM NUR6 [facility] [address]   8/19/2019  3:15 PM Angelica Mcleod, PT [facility]   8/21/2019  2:15 PM Angelica Mcleod, PT [facility]   8/26/2019  3:15 PM Angelica Mcleod, PT [facility]   8/27/2019  2:45 PM S[facility] MRI UNIT 1 [facility]   8/28/2019  3:00 PM Angelica Mcleod, PT [facility]   9/24/2019  8:30 AM Sanam Guerra MD [address]   9/24/2019  9:00 AM GCC CT SIMULATION [facility]

## 2019-08-14 ENCOUNTER — HOSPITAL ENCOUNTER (OUTPATIENT)
Dept: PHYSICAL THERAPY | Age: 79
Discharge: HOME | End: 2019-08-14
Attending: INTERNAL MEDICINE
Payer: MEDICARE

## 2019-08-14 PROCEDURE — 97110 THERAPEUTIC EXERCISES: CPT

## 2019-08-14 NOTE — PROGRESS NOTES
Shari Burnett  : 1940  [place]  Secondary: Vibha Mckeon 60. at [facility]  [address], [address], [place] [address]  [phone]   [phone]        OUTPATIENT PHYSICAL THERAPY: Daily Treatment Note 2019  Visit Count:  3       ICD-10: Treatment Diagnosis: muscle weakness generalized M62.81  Precautions/Allergies:   Betadine [povidone-iodine]; Pcn [penicillins]; and Pentasa [mesalamine]    TREATMENT PLAN:  Effective Dates: 2019 TO 2019 (90 days). Frequency/Duration: 2 times a week for 90 Day(s)       Pre-treatment Symptoms/Complaints:  The patient reports she feels better today. No diarrhea. Just fatigue. Pain: Initial: Pain Intensity 1: 0  /10 Post Session:  0/10   Medications Last Reviewed:  2019  Updated Objective Findings:  None Today  TREATMENT:     THERAPEUTIC EXERCISE: (37 minutes):  Exercises per grid below to improve mobility, strength and balance. Required minimal verbal cues to promote proper body alignment. Progressed resistance as indicated.   Activity  Date   19 Date   19   Date    Date  Date  Date  Date    Fatigue Score   weekly  7  8 8  9        Resting BP  138/87 147/73        Heart Rate   Before   After    91  106    86  112        O2 level Before   After    98  98    93  96        Machines  Wt/   sets/   reps  Wt/   sets/   reps  Wt/   sets/   reps  Wt/   sets/   reps  Wt/   sets/   reps  Wt/   sets/   reps  Wt/   sets/   reps    Leg Press            Leg Extension   x 10 reps with 5 count hold 7) x 10 reps with 5 count hold        Leg Curl            Dips/Pull Ups            Overhead Press   x 10 with 2# 8) x 10 reps with 2#        Compound Row            Low Back Ext            Cardiovascular  Time   Intensity, etc  Time   Intensity, etc  Time   Intensity, etc  Time   Intensity, etc  Time   Intensity, etc  Time   Intensity, etc  Time   Intensity, etc    Upper body ergometer  Level 1 x 4 min  2) level 1 x 4 in        [employer]            Recumbent Bike            laps   1) 325' x 1  O2 95   3) 325' x 1  5) 325' x 1  11) 325' x 1        Nustep   level 1 x 5 min 4) level 1 x 5 min  S[address] 59  Mets 2.0        Counter top push up   10) x 10 reps        Other Exercises/   Activities  Parameters  Parameters  Parameters  Parameters  Parameters  Parameters  Parameters    Biceps curls, abduction, flexion   8) x 10 reps with 2#        Sit to stand   x 10 reps 6) x 10 reps        Up on toes, hip abduction, hip extension, hamstring curls  x 10 reps 9) x 10 reps          MedBridge Portal  Treatment/Session Summary:    · Response to Treatment:  tolerated the treatment well. .  · Communication/Consultation:  practice sit to stand  · Equipment provided today:  None today  · Recommendations/Intent for next treatment session: Next visit will focus on strength, conditioning and balance.     Total Treatment Billable Duration:  37 minutes  PT Patient Time In/Time Out  Time In: 1913  Time Out: 7411  Jeff Rueda PT    Future Appointments   Date Time Provider Milla Betancur   8/16/2019  8:20 AM [facility] OUTREACH INSURANCE [facility]   8/16/2019  8:45 AM Zee Chandler MD [facility]   8/16/2019 10:00 AM NUR6 [facility]   8/19/2019  3:15 PM Angelica Mcleod, PT [facility]   8/21/2019  2:15 PM Angelica Mcleod, PT [facility]   8/26/2019  3:15 PM Angelica Mcleod, PT [facility]   8/27/2019  2:45 PM S[facility] MRI UNIT 1 [facility]   8/28/2019  3:00 PM Angelica Mcleod, PT [facility]   9/24/2019  8:30 [facility]   9/24/2019  9:00 AM GCC CT SIMULATION [facility]

## 2019-08-16 ENCOUNTER — PATIENT OUTREACH (OUTPATIENT)
Dept: CASE MANAGEMENT | Age: 79
End: 2019-08-16

## 2019-08-16 ENCOUNTER — HOSPITAL ENCOUNTER (OUTPATIENT)
Dept: LAB | Age: 79
Discharge: HOME | End: 2019-08-16
Payer: MEDICARE

## 2019-08-16 ENCOUNTER — HOSPITAL ENCOUNTER (OUTPATIENT)
Dept: INFUSION THERAPY | Age: 79
Discharge: HOME | End: 2019-08-16
Payer: MEDICARE

## 2019-08-16 DIAGNOSIS — C50.911 MALIGNANT NEOPLASM OF RIGHT BREAST IN FEMALE, ESTROGEN RECEPTOR POSITIVE, UNSPECIFIED SITE OF BREAST (HCC): Primary | ICD-10-CM

## 2019-08-16 DIAGNOSIS — C50.111 MALIGNANT NEOPLASM OF CENTRAL PORTION OF RIGHT BREAST IN FEMALE, ESTROGEN RECEPTOR POSITIVE (HCC): ICD-10-CM

## 2019-08-16 DIAGNOSIS — Z17.0 MALIGNANT NEOPLASM OF CENTRAL PORTION OF RIGHT BREAST IN FEMALE, ESTROGEN RECEPTOR POSITIVE (HCC): ICD-10-CM

## 2019-08-16 DIAGNOSIS — Z17.0 MALIGNANT NEOPLASM OF RIGHT BREAST IN FEMALE, ESTROGEN RECEPTOR POSITIVE, UNSPECIFIED SITE OF BREAST (HCC): Primary | ICD-10-CM

## 2019-08-16 LAB
ALBUMIN SERPL-MCNC: 3.7 G/DL (ref 3.2–4.6)
ALBUMIN/GLOB SERPL: 1.2 {RATIO} (ref 1.2–3.5)
ALP SERPL-CCNC: 61 U/L (ref 50–136)
ALT SERPL-CCNC: 20 U/L (ref 12–65)
ANION GAP SERPL CALC-SCNC: 8 MMOL/L (ref 7–16)
AST SERPL-CCNC: 8 U/L (ref 15–37)
BASOPHILS # BLD: 0 K/UL (ref 0–0.2)
BASOPHILS NFR BLD: 0 % (ref 0–2)
BILIRUB SERPL-MCNC: 0.2 MG/DL (ref 0.2–1.1)
BUN SERPL-MCNC: 19 MG/DL (ref 8–23)
CALCIUM SERPL-MCNC: 8.9 MG/DL (ref 8.3–10.4)
CHLORIDE SERPL-SCNC: 107 MMOL/L (ref 98–107)
CO2 SERPL-SCNC: 26 MMOL/L (ref 21–32)
CREAT SERPL-MCNC: 1.14 MG/DL (ref 0.6–1)
DIFFERENTIAL METHOD BLD: ABNORMAL
EOSINOPHIL # BLD: 0 K/UL (ref 0–0.8)
EOSINOPHIL NFR BLD: 0 % (ref 0.5–7.8)
ERYTHROCYTE [DISTWIDTH] IN BLOOD BY AUTOMATED COUNT: 18.6 % (ref 11.9–14.6)
GLOBULIN SER CALC-MCNC: 3.2 G/DL (ref 2.3–3.5)
GLUCOSE SERPL-MCNC: 142 MG/DL (ref 65–100)
HCT VFR BLD AUTO: 33.2 % (ref 35.8–46.3)
HGB BLD-MCNC: 10.7 G/DL (ref 11.7–15.4)
IMM GRANULOCYTES # BLD AUTO: 0 K/UL (ref 0–0.5)
IMM GRANULOCYTES NFR BLD AUTO: 0 % (ref 0–5)
LYMPHOCYTES # BLD: 2.2 K/UL (ref 0.5–4.6)
LYMPHOCYTES NFR BLD: 29 % (ref 13–44)
MAGNESIUM SERPL-MCNC: 1.5 MG/DL (ref 1.8–2.4)
MCH RBC QN AUTO: 34.4 PG (ref 26.1–32.9)
MCHC RBC AUTO-ENTMCNC: 32.2 G/DL (ref 31.4–35)
MCV RBC AUTO: 106.8 FL (ref 79.6–97.8)
MONOCYTES # BLD: 0.2 K/UL (ref 0.1–1.3)
MONOCYTES NFR BLD: 3 % (ref 4–12)
NEUTS SEG # BLD: 5.1 K/UL (ref 1.7–8.2)
NEUTS SEG NFR BLD: 68 % (ref 43–78)
NRBC # BLD: 0 K/UL (ref 0–0.2)
PLATELET # BLD AUTO: 153 K/UL (ref 150–450)
PMV BLD AUTO: 10.1 FL (ref 9.4–12.3)
POTASSIUM SERPL-SCNC: 3.3 MMOL/L (ref 3.5–5.1)
PROT SERPL-MCNC: 6.9 G/DL (ref 6.3–8.2)
RBC # BLD AUTO: 3.11 M/UL (ref 4.05–5.25)
SODIUM SERPL-SCNC: 141 MMOL/L (ref 136–145)
WBC # BLD AUTO: 7.5 K/UL (ref 4.3–11.1)

## 2019-08-16 PROCEDURE — 96413 CHEMO IV INFUSION 1 HR: CPT

## 2019-08-16 PROCEDURE — 83735 ASSAY OF MAGNESIUM: CPT

## 2019-08-16 PROCEDURE — 74011000258 HC RX REV CODE- 258: Performed by: INTERNAL MEDICINE

## 2019-08-16 PROCEDURE — 36415 COLL VENOUS BLD VENIPUNCTURE: CPT

## 2019-08-16 PROCEDURE — 85025 COMPLETE CBC W/AUTO DIFF WBC: CPT

## 2019-08-16 PROCEDURE — 96367 TX/PROPH/DG ADDL SEQ IV INF: CPT

## 2019-08-16 PROCEDURE — 96375 TX/PRO/DX INJ NEW DRUG ADDON: CPT

## 2019-08-16 PROCEDURE — 74011000250 HC RX REV CODE- 250: Performed by: INTERNAL MEDICINE

## 2019-08-16 PROCEDURE — 74011250636 HC RX REV CODE- 250/636: Performed by: INTERNAL MEDICINE

## 2019-08-16 PROCEDURE — 80053 COMPREHEN METABOLIC PANEL: CPT

## 2019-08-16 PROCEDURE — 36593 DECLOT VASCULAR DEVICE: CPT

## 2019-08-16 PROCEDURE — 96417 CHEMO IV INFUS EACH ADDL SEQ: CPT

## 2019-08-16 RX ORDER — ONDANSETRON 2 MG/ML
8 INJECTION INTRAMUSCULAR; INTRAVENOUS ONCE
Status: COMPLETED | OUTPATIENT
Start: 2019-08-16 | End: 2019-08-16

## 2019-08-16 RX ORDER — DEXAMETHASONE SODIUM PHOSPHATE 100 MG/10ML
10 INJECTION INTRAMUSCULAR; INTRAVENOUS ONCE
Status: COMPLETED | OUTPATIENT
Start: 2019-08-16 | End: 2019-08-16

## 2019-08-16 RX ORDER — SODIUM CHLORIDE 9 MG/ML
25 INJECTION, SOLUTION INTRAVENOUS CONTINUOUS
Status: ACTIVE | OUTPATIENT
Start: 2019-08-16 | End: 2019-08-16

## 2019-08-16 RX ORDER — SODIUM CHLORIDE 0.9 % (FLUSH) 0.9 %
10 SYRINGE (ML) INJECTION AS NEEDED
Status: ACTIVE | OUTPATIENT
Start: 2019-08-16 | End: 2019-08-16

## 2019-08-16 RX ADMIN — TRASTUZUMAB 512 MG: 150 INJECTION, POWDER, LYOPHILIZED, FOR SOLUTION INTRAVENOUS at 11:39

## 2019-08-16 RX ADMIN — DOCETAXEL 149 MG: 10 INJECTION, SOLUTION INTRAVENOUS at 12:12

## 2019-08-16 RX ADMIN — SODIUM CHLORIDE 25 ML/HR: 9 INJECTION, SOLUTION INTRAVENOUS at 11:32

## 2019-08-16 RX ADMIN — DEXAMETHASONE SODIUM PHOSPHATE 10 MG: 10 INJECTION INTRAMUSCULAR; INTRAVENOUS at 11:10

## 2019-08-16 RX ADMIN — SODIUM CHLORIDE 150 MG: 900 INJECTION, SOLUTION INTRAVENOUS at 11:12

## 2019-08-16 RX ADMIN — ONDANSETRON 8 MG: 2 INJECTION INTRAMUSCULAR; INTRAVENOUS at 11:07

## 2019-08-16 RX ADMIN — CARBOPLATIN 453 MG: 10 INJECTION, SOLUTION INTRAVENOUS at 13:13

## 2019-08-16 RX ADMIN — WATER 2 MG: 1 INJECTION INTRAMUSCULAR; INTRAVENOUS; SUBCUTANEOUS at 10:25

## 2019-08-16 RX ADMIN — Medication 10 ML: at 13:55

## 2019-08-16 NOTE — PROGRESS NOTES
Pt. Discharged ambulatory. Tolerated chemotherapy well. No distress noted. To call physician with any problems or concerns. Understanding voiced. To return to [facility] on 9/6/19.

## 2019-08-16 NOTE — PROGRESS NOTES
Saw patient today with Dr Hernan Roberts prior to [address]. Pt is doing well at present-labs are stable. Pt denies N/V/D or acute issues.  Pt will have MRI and meet with surgeon prior to next chemo and decide which type of surgery (mastectomy vs lumpectomy) would be best. Nurse navigation is continuing to follow

## 2019-08-16 NOTE — PROGRESS NOTES
Permission given to use port for chemotherapy without blood return from Dr. Pyle Mins. Port is not red or swollen. No pain when flushing and flushes without difficulty.

## 2019-08-19 ENCOUNTER — HOSPITAL ENCOUNTER (OUTPATIENT)
Dept: PHYSICAL THERAPY | Age: 79
Discharge: HOME | End: 2019-08-19
Attending: INTERNAL MEDICINE
Payer: MEDICARE

## 2019-08-19 PROCEDURE — 97110 THERAPEUTIC EXERCISES: CPT

## 2019-08-19 NOTE — PROGRESS NOTES
Larri Fothergill  : 1940  [place]  Secondary: Vibha Mckeon 59. at [facility]  [address], [address], [place] [address]  [phone]   [phone]        OUTPATIENT PHYSICAL THERAPY: Daily Treatment Note 2019  Visit Count:  4       ICD-10: Treatment Diagnosis: muscle weakness generalized M62.81  Precautions/Allergies:   Betadine [povidone-iodine]; Pcn [penicillins]; and Pentasa [mesalamine]    TREATMENT PLAN:  Effective Dates: 2019 TO 2019 (90 days). Frequency/Duration: 2 times a week for 90 Day(s)       Pre-treatment Symptoms/Complaints:  The patient reports she had a fall this morning, but did not get hurt. She reports she had her last chemo. Pain: Initial:   0 /10 Post Session:  0/10   Medications Last Reviewed:  2019  Updated Objective Findings:  None Today  TREATMENT:     THERAPEUTIC EXERCISE: (41 minutes):  Exercises per grid below to improve mobility, strength and balance. Required minimal verbal cues to promote proper body alignment. Progressed resistance as indicated.   Activity  Date   19 Date   19   Date   19   Date  Date  Date  Date    Fatigue Score   weekly  7  8 8  9 9       Resting BP  138/87 147/73 141/63       Heart Rate   Before   After    91  106    86  112 102  103       O2 level Before   After    98  98    93  96 98  97       Machines  Wt/   sets/   reps  Wt/   sets/   reps  Wt/   sets/   reps  Wt/   sets/   reps  Wt/   sets/   reps  Wt/   sets/   reps  Wt/   sets/   reps    Leg Press            Leg Extension   x 10 reps with 5 count hold 7) x 10 reps with 5 count hold 7) x 10 reps with 5 count hold       Leg Curl            Dips/Pull Ups            Overhead Press   x 10 with 2# 8) x 10 reps with 2# 8) x 10 reps with 2#       Compound Row            Low Back Ext            Cardiovascular  Time   Intensity, etc  Time   Intensity, etc  Time   Intensity, etc  Time Intensity, etc  Time   Intensity, etc  Time   Intensity, etc  Time   Intensity, etc    Upper body ergometer  Level 1 x 4 min  2) level 1 x 4 in 2) level 1 x 4 min       Airdyne bikes            Recumbent Bike            laps   1) 325' x 1  O2 95   3) 325' x 1  5) 325' x 1  11) 325' x 1 1) 325' x 1  O2 98    3) 325' x 1  5) 325' x 1  O2 96           Nustep   level 1 x 5 min 4) level 1 x 5 min  Spm 59  Mets 2.0 4) level 1 x 7 min  Spm 62  Mets 2.2       Counter top push up   10) x 10 reps 10) x 5 reps       Other Exercises/   Activities  Parameters  Parameters  Parameters  Parameters  Parameters  Parameters  Parameters    Biceps curls, abduction, flexion   8) x 10 reps with 2# 8) x 10 reps with 2#       Sit to stand   x 10 reps 6) x 10 reps 6) x 10 reps       Up on toes, hip abduction, hip extension, hamstring curls  x 10 reps 9) x 10 reps          [facility] Portal  Treatment/Session Summary:    · Response to Treatment:  tolerated the treatment fair. fatigue with activity. unable to complete session. Allowed multiple rest breaks. · Communication/Consultation:  use a cane or walker for safety over the next few days due to weakness with chemo  · Equipment provided today:  None today  · Recommendations/Intent for next treatment session: Next visit will focus on strength, conditioning and balance.     Total Treatment Billable Duration:  41 minutes  PT Patient Time In/Time Out  Time In: 3893  Time Out: 5346  Bhargavi Blanco PT    Future Appointments   Date Time Provider Milla Betancur   8/21/2019  2:15 PM Betsy Esposito PT [facility]   8/26/2019  3:15 PM Angelica Mcleod, PT [facility]   8/27/2019  2:45 PM SFE MRI UNIT 1 SFERMRI SFE   8/28/2019  3:00 PM Angelica Mcleod, PT [facility]   9/6/2019  9:50 AM [facility]   9/6/2019 10:15 AM Brinda Navarro MD [facility]   9/6/2019 11:00 AM  GCCOPIG GVL [facility]   9/24/2019  8:30 AM Ami Rocha MD GCCROG GVL [facility]   9/24/2019  9:00 AM [facility] CT SIMULATION GCCROG GVL [facility]

## 2019-08-26 ENCOUNTER — HOSPITAL ENCOUNTER (OUTPATIENT)
Dept: PHYSICAL THERAPY | Age: 79
Discharge: HOME | End: 2019-08-26
Attending: INTERNAL MEDICINE
Payer: MEDICARE

## 2019-08-26 PROCEDURE — 97110 THERAPEUTIC EXERCISES: CPT

## 2019-08-26 NOTE — PROGRESS NOTES
Nereyda Bhatti  : 1940  [place]  Secondary: Vibha Mckeon 59. at [facility]  [address], [address], [place] [address]  [phone]   [phone]        OUTPATIENT PHYSICAL THERAPY: Daily Treatment Note 2019  Visit Count:  5       ICD-10: Treatment Diagnosis: muscle weakness generalized M62.81  Precautions/Allergies:   Betadine [povidone-iodine]; Pcn [penicillins]; and Pentasa [mesalamine]    TREATMENT PLAN:  Effective Dates: 2019 TO 2019 (90 days). Frequency/Duration: 2 times a week for 90 Day(s)       Pre-treatment Symptoms/Complaints:  The patient reports she has been using a rolling walker since the last time she was in therapy. She reports she has not had any more falls. She repotrs she will be having an MRI tomorrow. Pain: Initial:   0 /10 Post Session:  0/10   Medications Last Reviewed:  2019  Updated Objective Findings:  None Today  TREATMENT:     THERAPEUTIC EXERCISE: (48 minutes):  Exercises per grid below to improve mobility, strength and balance. Required minimal verbal cues to promote proper body alignment. Progressed resistance as indicated.   Activity  Date   19 Date   19   Date   19   Date   19 Date  Date  Date    Fatigue Score   weekly  7  8 8  9 9 9      Resting BP  138/87 147/73 141/63 154/77      Heart Rate   Before   After    91  106    86  112 102  103 85  117      O2 level Before   After    98  98    93  96 98  97 97  98      Machines  Wt/   sets/   reps  Wt/   sets/   reps  Wt/   sets/   reps  Wt/   sets/   reps  Wt/   sets/   reps  Wt/   sets/   reps  Wt/   sets/   reps    Leg Press            Leg Extension   x 10 reps with 5 count hold 7) x 10 reps with 5 count hold 7) x 10 reps with 5 count hold 7) x 10 reps with 5 count hold      Leg Curl            Dips/Pull Ups            Overhead Press   x 10 with 2# 8) x 10 reps with 2# 8) x 10 reps with 2# 8) x 10 reps with 2#      Compound Row            Low Back Ext            Cardiovascular  Time   Intensity, etc  Time   Intensity, etc  Time   Intensity, etc  Time   Intensity, etc  Time   Intensity, etc  Time   Intensity, etc  Time   Intensity, etc    Upper body ergometer  Level 1 x 4 min  2) level 1 x 4 in 2) level 1 x 4 min 2) level 1 x 4 min  O2 98  HR 98      Airdyne bikes            Recumbent Bike            laps   1) 325' x 1  O2 95   3) 325' x 1  5) 325' x 1  11) 325' x 1 1) 325' x 1  O2 98    3) 325' x 1  5) 325' x 1  O2 96     1) 325' x 1  3) 325' x 1  O2 98    5) 325' x 1  Using rolling walker      Nustep   level 1 x 5 min 4) level 1 x 5 min  Spm 59  Mets 2.0 4) level 1 x 7 min  Spm 62  Mets 2.2 4) level 1 x 7 min      Counter top push up   10) x 10 reps 10) x 5 reps 10) x 10 reps      Other Exercises/   Activities  Parameters  Parameters  Parameters  Parameters  Parameters  Parameters  Parameters    Biceps curls, abduction, flexion   8) x 10 reps with 2# 8) x 10 reps with 2# 8) x 10 reps with 2#      Sit to stand   x 10 reps 6) x 10 reps 6) x 10 reps 6) x 10 reps      Up on toes, hip abduction, hip extension, hamstring curls  x 10 reps 9) x 10 reps  9) x 10 reps        [facility] Portal  Treatment/Session Summary:    · Response to Treatment:  tolerated the treatment fair. using rolling walker today for stability  Allowed multiple rest breaks. · Communication/Consultation:  continue with assitive device  · Equipment provided today:  None today  · Recommendations/Intent for next treatment session: Next visit will focus on strength, conditioning and balance.     Total Treatment Billable Duration:  48 minutes  PT Patient Time In/Time Out  Time In: 1512  Time Out: 1600  Radha Torres PT    Future Appointments   Date Time Provider Milla Betancur   8/27/2019  2:45 PM SFE MRI UNIT 1 SFERMRI SFE   8/28/2019  3:00 PM Angelica Mcleod, PT [facility]   9/6/2019  9:50 AM [facility]8 [facility] INSURANCE GCCOIG [facility]   9/6/2019 10:15 AM Luke Allred MD [facility]   9/6/2019 11:00 AM NUR7 GCCOPIG [facility]   9/24/2019  8:30 AM Ernestine Juarez MD GCCROG [facility]   9/24/2019  9:00 AM [facility] CT SIMULATION [facility]

## 2019-08-27 ENCOUNTER — HOSPITAL ENCOUNTER (OUTPATIENT)
Dept: MRI IMAGING | Age: 79
Discharge: HOME | End: 2019-08-27
Attending: INTERNAL MEDICINE
Payer: MEDICARE

## 2019-08-27 DIAGNOSIS — C50.111 MALIGNANT NEOPLASM OF CENTRAL PORTION OF RIGHT BREAST IN FEMALE, ESTROGEN RECEPTOR POSITIVE (HCC): ICD-10-CM

## 2019-08-27 DIAGNOSIS — Z17.0 MALIGNANT NEOPLASM OF CENTRAL PORTION OF RIGHT BREAST IN FEMALE, ESTROGEN RECEPTOR POSITIVE (HCC): ICD-10-CM

## 2019-08-27 PROCEDURE — A9575 INJ GADOTERATE MEGLUMI 0.1ML: HCPCS | Performed by: INTERNAL MEDICINE

## 2019-08-27 PROCEDURE — 77049 MRI BREAST C-+ W/CAD BI: CPT

## 2019-08-27 PROCEDURE — 74011000258 HC RX REV CODE- 258: Performed by: INTERNAL MEDICINE

## 2019-08-27 PROCEDURE — 74011250636 HC RX REV CODE- 250/636: Performed by: INTERNAL MEDICINE

## 2019-08-27 RX ORDER — SODIUM CHLORIDE 0.9 % (FLUSH) 0.9 %
10 SYRINGE (ML) INJECTION
Status: COMPLETED | OUTPATIENT
Start: 2019-08-27 | End: 2019-08-27

## 2019-08-27 RX ORDER — GADOTERATE MEGLUMINE 376.9 MG/ML
17 INJECTION INTRAVENOUS
Status: COMPLETED | OUTPATIENT
Start: 2019-08-27 | End: 2019-08-27

## 2019-08-27 RX ADMIN — SODIUM CHLORIDE 100 ML: 900 INJECTION, SOLUTION INTRAVENOUS at 15:31

## 2019-08-27 RX ADMIN — GADOTERATE MEGLUMINE 17 ML: 376.9 INJECTION INTRAVENOUS at 15:31

## 2019-08-27 RX ADMIN — Medication 10 ML: at 15:30

## 2019-08-28 ENCOUNTER — HOSPITAL ENCOUNTER (OUTPATIENT)
Dept: PHYSICAL THERAPY | Age: 79
Discharge: HOME | End: 2019-08-28
Attending: INTERNAL MEDICINE
Payer: MEDICARE

## 2019-08-28 PROCEDURE — 97110 THERAPEUTIC EXERCISES: CPT

## 2019-08-28 NOTE — PROGRESS NOTES
Aguillon Clarity  : 1940  [place]  Secondary: Vibha Mckeon 59. at [facility]  [address], [address], [place]  [phone]   [phone]        OUTPATIENT PHYSICAL THERAPY: Daily Treatment Note 2019  Visit Count:  6       ICD-10: Treatment Diagnosis: muscle weakness generalized M62.81  Precautions/Allergies:   Betadine [povidone-iodine]; Pcn [penicillins]; and Pentasa [mesalamine]    TREATMENT PLAN:  Effective Dates: 2019 TO 2019 (90 days). Frequency/Duration: 2 times a week for 90 Day(s)       Pre-treatment Symptoms/Complaints:  The patient reports she has been using a rolling walker since the last time she was in therapy. She reports she has not had any more falls. She repotrs she will be having an MRI tomorrow. Pain: Initial:   0 /10 Post Session:  0/10   Medications Last Reviewed:  2019  Updated Objective Findings:  None Today  TREATMENT:     THERAPEUTIC EXERCISE: (48 minutes):  Exercises per grid below to improve mobility, strength and balance. Required minimal verbal cues to promote proper body alignment. Progressed resistance as indicated.   Activity  Date   19 Date   19   Date   19   Date   19 Date   19 Date  Date    Fatigue Score   weekly  7  8 8  9 9 9 8  7     Resting BP  138/87 147/73 141/63 154/77 166/76       Heart Rate   Before   After    91  106    86  112 102  103 85  117 81  97     O2 level Before   After    98  98    93  96 98  97 97  98 97  97     Machines  Wt/   sets/   reps  Wt/   sets/   reps  Wt/   sets/   reps  Wt/   sets/   reps  Wt/   sets/   reps  Wt/   sets/   reps  Wt/   sets/   reps    Leg Press            Leg Extension   x 10 reps with 5 count hold 7) x 10 reps with 5 count hold 7) x 10 reps with 5 count hold 7) x 10 reps with 5 count hold 7) x 10 reps     Leg Curl            Dips/Pull Ups            Overhead Press   x 10 with 2# 8) x 10 reps with 2# 8) x 10 reps with 2# 8) x 10 reps with 2# 8) x 10 reps with 2#     Compound Row            Low Back Ext            Cardiovascular  Time   Intensity, etc  Time   Intensity, etc  Time   Intensity, etc  Time   Intensity, etc  Time   Intensity, etc  Time   Intensity, etc  Time   Intensity, etc    Upper body ergometer  Level 1 x 4 min  2) level 1 x 4 in 2) level 1 x 4 min 2) level 1 x 4 min  O2 98  HR 98 2) level 1 x 4 min     Airdyne bikes            Recumbent Bike            laps   1) 325' x 1  O2 95   3) 325' x 1  5) 325' x 1  11) 325' x 1 1) 325' x 1  O2 98    3) 325' x 1  5) 325' x 1  O2 96     1) 325' x 1  3) 325' x 1  O2 98    5) 325' x 1  Using rolling walker 1) 325' x 1  O2 96  HR 94  3) 325' x 1  5) 325' x 1     Nustep   level 1 x 5 min 4) level 1 x 5 min  Spm 59  Mets 2.0 4) level 1 x 7 min  Spm 62  Mets 2.2 4) level 1 x 7 min 4) level 1 x 7 min     Counter top push up   10) x 10 reps 10) x 5 reps 10) x 10 reps 10) x 10 reps     Other Exercises/   Activities  Parameters  Parameters  Parameters  Parameters  Parameters  Parameters  Parameters    Biceps curls, abduction, flexion   8) x 10 reps with 2# 8) x 10 reps with 2# 8) x 10 reps with 2# 8) x 10 reps with 2#     Sit to stand   x 10 reps 6) x 10 reps 6) x 10 reps 6) x 10 reps 6) x 10 reps     Up on toes, hip abduction, hip extension, hamstring curls  x 10 reps 9) x 10 reps  9) x 10 reps 9) x 10 reps       [facility] Portal  Treatment/Session Summary:    · Response to Treatment:  tolerated the treatment well.  gait speed increased. stability improved. · Communication/Consultation:  continue with assitive device  · Equipment provided today:  None today  · Recommendations/Intent for next treatment session: Next visit will focus on strength, conditioning and balance.     Total Treatment Billable Duration:  48 minutes  PT Patient Time In/Time Out  Time In: 1454  Time Out: Yas Pereira, PT    Future Appointments Date Time Provider Milla Betancur   9/3/2019  3:15 PM Harsh MELENDEZ, PT [facility]   9/4/2019  3:00 PM Angelica Mcleod, PT [facility]   9/6/2019  9:50 AM [facility] OUTREACH INSURANCE GCCOIG [facility]   9/6/2019 10:15 AM Liang Henry MD [facility]   9/6/2019 11:00 AM [facility] GV [address] [facility]   9/9/2019  3:15 PM Angelica Mcleod, PT [facility]   9/12/2019  3:15 PM Angelica Mcleod, PT [facility]   9/16/2019  3:15 PM Angelica Mcleod, PT [facility]   9/18/2019  3:00 PM Angelica Mcleod, PT [facility]   9/23/2019  3:15 PM Angelica Mcleod, PT [facility]   9/24/2019  8:30 AM Sherrie Dumont MD GCCROG [address]   9/24/2019  9:00 AM [facility] CT [facility]   9/25/2019  3:00 PM Ezio Mcleod, PT [facility]

## 2019-09-04 ENCOUNTER — HOSPITAL ENCOUNTER (OUTPATIENT)
Dept: PHYSICAL THERAPY | Age: 79
Discharge: HOME | End: 2019-09-04
Attending: INTERNAL MEDICINE
Payer: MEDICARE

## 2019-09-04 PROCEDURE — 97110 THERAPEUTIC EXERCISES: CPT

## 2019-09-04 NOTE — PROGRESS NOTES
Marietta Cranker  : 1940  [place]  Secondary: Vibha Mckeon 59. at [facility]  [address], [address], [place] [address]  [phone]   [phone]        OUTPATIENT PHYSICAL THERAPY: Daily Treatment Note 2019  Visit Count:  7       ICD-10: Treatment Diagnosis: muscle weakness generalized M62.81  Precautions/Allergies:   Betadine [povidone-iodine]; Pcn [penicillins]; and Pentasa [mesalamine]    TREATMENT PLAN:  Effective Dates: 2019 TO 2019 (90 days). Frequency/Duration: 2 times a week for 90 Day(s)       Pre-treatment Symptoms/Complaints:  The patient reports she sees her doctor Friday for the results of her MRI. She reports she will be having surgery next Wednesday. Pain: Initial:   0 /10 Post Session:  0/10   Medications Last Reviewed:  2019  Updated Objective Findings:  None Today  TREATMENT:     THERAPEUTIC EXERCISE: (44 minutes):  Exercises per grid below to improve mobility, strength and balance. Required minimal verbal cues to promote proper body alignment. Progressed resistance as indicated.   Activity  Date   19 Date   19   Date   19   Date   19 Date   19 Date   19 Date    Fatigue Score   weekly  7  8 8  9 9 9 8  7 8    Resting BP  138/87 147/73 141/63 154/77 166/76   130/78      Heart Rate   Before   After    91  106    86  112 102  103 85  117 81  97 65    O2 level Before   After    98  98    93  96 98  97 97  98 97  97 97    Machines  Wt/   sets/   reps  Wt/   sets/   reps  Wt/   sets/   reps  Wt/   sets/   reps  Wt/   sets/   reps  Wt/   sets/   reps  Wt/   sets/   reps    Leg Press            Leg Extension   x 10 reps with 5 count hold 7) x 10 reps with 5 count hold 7) x 10 reps with 5 count hold 7) x 10 reps with 5 count hold 7) x 10 reps 70 x 10 reps with 5 count hold    Leg Curl            Dips/Pull Ups            Overhead Press   x 10 with 2# 8) x 10 reps with 2# 8) x 10 reps with 2# 8) x 10 reps with 2# 8) x 10 reps with 2# 8) x 10 reps with 2#    Compound Row            Low Back Ext            Cardiovascular  Time   Intensity, etc  Time   Intensity, etc  Time   Intensity, etc  Time   Intensity, etc  Time   Intensity, etc  Time   Intensity, etc  Time   Intensity, etc    Upper body ergometer  Level 1 x 4 min  2) level 1 x 4 in 2) level 1 x 4 min 2) level 1 x 4 min  O2 98  HR 98 2) level 1 x 4 min 2) level 1 x 4 min    Airdyne bikes            Recumbent Bike            laps   1) 325' x 1  O2 95   3) 325' x 1  5) 325' x 1  11) 325' x 1 1) 325' x 1  O2 98    3) 325' x 1  5) 325' x 1  O2 96     1) 325' x 1  3) 325' x 1  O2 98    5) 325' x 1  Using rolling walker 1) 325' x 1  O2 96  HR 94  3) 325' x 1  5) 325' x 1 1) 325' x 1  O2 97  HR 91  3) 325' x 1  5) 325' x 1    Nustep   level 1 x 5 min 4) level 1 x 5 min  Spm 59  Mets 2.0 4) level 1 x 7 min  Spm 62  Mets 2.2 4) level 1 x 7 min 4) level 1 x 7 min 4) level 1 x 9 min    Counter top push up   10) x 10 reps 10) x 5 reps 10) x 10 reps 10) x 10 reps     Other Exercises/   Activities  Parameters  Parameters  Parameters  Parameters  Parameters  Parameters  Parameters    Biceps curls, abduction, flexion   8) x 10 reps with 2# 8) x 10 reps with 2# 8) x 10 reps with 2# 8) x 10 reps with 2# 8) x 10 reps with 2#    Sit to stand   x 10 reps 6) x 10 reps 6) x 10 reps 6) x 10 reps 6) x 10 reps 6) x 10 reps    Up on toes, hip abduction, hip extension, hamstring curls  x 10 reps 9) x 10 reps  9) x 10 reps 9) x 10 reps 9) x 10 reps      [facility] Portal  Treatment/Session Summary:    · Response to Treatment:  tolerated the treatment well.  gait speed increased. stability improved. Will hold therapy due to upcoming surgery.   · Communication/Consultation:  continue with assitive device  · Equipment provided today:  None today  · Recommendations/Intent for next treatment session: Next visit will focus on strength, conditioning and balance.     Total Treatment Billable Duration:  44 minutes  PT Patient Time In/Time Out  Time In: 1505  Time Out: 8337  Lelia Chaudhry, LUIS    Future Appointments   Date Time Provider Milla Gypsy   9/6/2019  9:50 AM [facility]le [facility] [address] [place] [address]   9/6/2019 10:15 AM [facility]   9/6/2019 11:00 AM NUR7 GCCOPIG [address]   9/9/2019  3:15 PM Angelica Mcleod, PT [facility]   9/10/2019  1:00 PM Mariya Baxter MD [facility]   9/12/2019  3:15 PM Angelica Mcleod, PT [facility]   9/16/2019  3:15 PM Angelica Mcleod, PT [facility]   9/18/2019  3:00 PM Angelica Mcleod, PT [facility]   9/23/2019  3:15 PM Angelica Mcleod, PT [facility]   9/24/2019  8:30 AM Michelle Iglesias MD GCCROG [address]   9/24/2019  9:00 AM GCC [facility]   9/25/2019  3:00 PM Maribell Mcleod, PT [facility]

## 2019-09-06 ENCOUNTER — HOSPITAL ENCOUNTER (OUTPATIENT)
Dept: INFUSION THERAPY | Age: 79
Discharge: HOME | End: 2019-09-06
Payer: MEDICARE

## 2019-09-06 ENCOUNTER — HOSPITAL ENCOUNTER (OUTPATIENT)
Dept: LAB | Age: 79
Discharge: HOME | End: 2019-09-06
Payer: MEDICARE

## 2019-09-06 ENCOUNTER — PATIENT OUTREACH (OUTPATIENT)
Dept: CASE MANAGEMENT | Age: 79
End: 2019-09-06

## 2019-09-06 DIAGNOSIS — C50.911 MALIGNANT NEOPLASM OF RIGHT BREAST IN FEMALE, ESTROGEN RECEPTOR POSITIVE, UNSPECIFIED SITE OF BREAST (HCC): Primary | ICD-10-CM

## 2019-09-06 DIAGNOSIS — Z17.0 MALIGNANT NEOPLASM OF NIPPLE OF RIGHT BREAST IN FEMALE, ESTROGEN RECEPTOR POSITIVE (HCC): ICD-10-CM

## 2019-09-06 DIAGNOSIS — Z17.0 MALIGNANT NEOPLASM OF RIGHT BREAST IN FEMALE, ESTROGEN RECEPTOR POSITIVE, UNSPECIFIED SITE OF BREAST (HCC): Primary | ICD-10-CM

## 2019-09-06 DIAGNOSIS — C50.011 MALIGNANT NEOPLASM OF NIPPLE OF RIGHT BREAST IN FEMALE, ESTROGEN RECEPTOR POSITIVE (HCC): ICD-10-CM

## 2019-09-06 LAB
ALBUMIN SERPL-MCNC: 3.4 G/DL (ref 3.2–4.6)
ALBUMIN/GLOB SERPL: 1 {RATIO} (ref 1.2–3.5)
ALP SERPL-CCNC: 74 U/L (ref 50–136)
ALT SERPL-CCNC: 16 U/L (ref 12–65)
ANION GAP SERPL CALC-SCNC: 10 MMOL/L (ref 7–16)
AST SERPL-CCNC: 7 U/L (ref 15–37)
BASOPHILS # BLD: 0 K/UL (ref 0–0.2)
BASOPHILS NFR BLD: 1 % (ref 0–2)
BILIRUB SERPL-MCNC: 0.2 MG/DL (ref 0.2–1.1)
BUN SERPL-MCNC: 12 MG/DL (ref 8–23)
CALCIUM SERPL-MCNC: 9 MG/DL (ref 8.3–10.4)
CHLORIDE SERPL-SCNC: 110 MMOL/L (ref 98–107)
CO2 SERPL-SCNC: 24 MMOL/L (ref 21–32)
CREAT SERPL-MCNC: 0.82 MG/DL (ref 0.6–1)
DIFFERENTIAL METHOD BLD: ABNORMAL
EOSINOPHIL # BLD: 0 K/UL (ref 0–0.8)
EOSINOPHIL NFR BLD: 0 % (ref 0.5–7.8)
ERYTHROCYTE [DISTWIDTH] IN BLOOD BY AUTOMATED COUNT: 16.7 % (ref 11.9–14.6)
GLOBULIN SER CALC-MCNC: 3.3 G/DL (ref 2.3–3.5)
GLUCOSE SERPL-MCNC: 112 MG/DL (ref 65–100)
HCT VFR BLD AUTO: 35.5 % (ref 35.8–46.3)
HGB BLD-MCNC: 11.1 G/DL (ref 11.7–15.4)
IMM GRANULOCYTES # BLD AUTO: 0 K/UL (ref 0–0.5)
IMM GRANULOCYTES NFR BLD AUTO: 1 % (ref 0–5)
LYMPHOCYTES # BLD: 1.6 K/UL (ref 0.5–4.6)
LYMPHOCYTES NFR BLD: 26 % (ref 13–44)
MAGNESIUM SERPL-MCNC: 1.7 MG/DL (ref 1.8–2.4)
MCH RBC QN AUTO: 34.9 PG (ref 26.1–32.9)
MCHC RBC AUTO-ENTMCNC: 31.3 G/DL (ref 31.4–35)
MCV RBC AUTO: 111.6 FL (ref 79.6–97.8)
MONOCYTES # BLD: 0.4 K/UL (ref 0.1–1.3)
MONOCYTES NFR BLD: 6 % (ref 4–12)
NEUTS SEG # BLD: 4.1 K/UL (ref 1.7–8.2)
NEUTS SEG NFR BLD: 67 % (ref 43–78)
NRBC # BLD: 0 K/UL (ref 0–0.2)
PLATELET # BLD AUTO: 146 K/UL (ref 150–450)
PMV BLD AUTO: 9.7 FL (ref 9.4–12.3)
POTASSIUM SERPL-SCNC: 4.2 MMOL/L (ref 3.5–5.1)
PROT SERPL-MCNC: 6.7 G/DL (ref 6.3–8.2)
RBC # BLD AUTO: 3.18 M/UL (ref 4.05–5.25)
SODIUM SERPL-SCNC: 144 MMOL/L (ref 136–145)
WBC # BLD AUTO: 6.1 K/UL (ref 4.3–11.1)

## 2019-09-06 PROCEDURE — 96413 CHEMO IV INFUSION 1 HR: CPT

## 2019-09-06 PROCEDURE — 83735 ASSAY OF MAGNESIUM: CPT

## 2019-09-06 PROCEDURE — 85025 COMPLETE CBC W/AUTO DIFF WBC: CPT

## 2019-09-06 PROCEDURE — 74011250636 HC RX REV CODE- 250/636: Performed by: INTERNAL MEDICINE

## 2019-09-06 PROCEDURE — 80053 COMPREHEN METABOLIC PANEL: CPT

## 2019-09-06 RX ORDER — HYDROCORTISONE SODIUM SUCCINATE 100 MG/2ML
100 INJECTION, POWDER, FOR SOLUTION INTRAMUSCULAR; INTRAVENOUS AS NEEDED
Status: ACTIVE | OUTPATIENT
Start: 2019-09-06 | End: 2019-09-06

## 2019-09-06 RX ORDER — SODIUM CHLORIDE 9 MG/ML
25 INJECTION, SOLUTION INTRAVENOUS CONTINUOUS
Status: ACTIVE | OUTPATIENT
Start: 2019-09-06 | End: 2019-09-06

## 2019-09-06 RX ORDER — DIPHENHYDRAMINE HYDROCHLORIDE 50 MG/ML
50 INJECTION, SOLUTION INTRAMUSCULAR; INTRAVENOUS AS NEEDED
Status: ACTIVE | OUTPATIENT
Start: 2019-09-06 | End: 2019-09-06

## 2019-09-06 RX ORDER — SODIUM CHLORIDE 0.9 % (FLUSH) 0.9 %
10 SYRINGE (ML) INJECTION AS NEEDED
Status: ACTIVE | OUTPATIENT
Start: 2019-09-06 | End: 2019-09-06

## 2019-09-06 RX ADMIN — SODIUM CHLORIDE 25 ML/HR: 900 INJECTION, SOLUTION INTRAVENOUS at 11:35

## 2019-09-06 RX ADMIN — TRASTUZUMAB 512 MG: 150 INJECTION, POWDER, LYOPHILIZED, FOR SOLUTION INTRAVENOUS at 11:56

## 2019-09-06 RX ADMIN — Medication 10 ML: at 11:35

## 2019-09-06 NOTE — PROGRESS NOTES
9/6/2019 Saw the patient with Dr Darron Osorio. She is here for Herceptin only. She will have surgery next week. She is having nail changes and has lost a toe nail. Her fingernails are more sensitive. Will follow up after surgery. Will continue to follow.

## 2019-09-06 NOTE — PROGRESS NOTES
Arrived to the [facility]. Assessment completed and labs reviewed. Herceptin completed. Patient tolerated well. Any issues or concerns during appointment: Aden Landeros.  Patient aware of next infusion appointment on 9/26/19 at [place] [address].  Discharged ambulatory with walker accompanied by family member.

## 2019-09-10 PROBLEM — F32.A ANXIETY AND DEPRESSION: Status: ACTIVE | Noted: 2019-09-10

## 2019-09-10 PROBLEM — R29.3 POSTURAL IMBALANCE: Status: ACTIVE | Noted: 2019-09-10

## 2019-09-10 PROBLEM — R25.1 TREMOR: Status: ACTIVE | Noted: 2019-09-10

## 2019-09-10 PROBLEM — F41.9 ANXIETY AND DEPRESSION: Status: ACTIVE | Noted: 2019-09-10

## 2019-09-10 PROBLEM — R49.8 HYPOPHONIA: Status: ACTIVE | Noted: 2019-09-10

## 2019-09-10 PROBLEM — G20 PARKINSON DISEASE (HCC): Status: ACTIVE | Noted: 2019-09-10

## 2019-09-12 ENCOUNTER — APPOINTMENT (OUTPATIENT)
Dept: PHYSICAL THERAPY | Age: 79
End: 2019-09-12
Attending: INTERNAL MEDICINE
Payer: MEDICARE

## 2019-09-16 ENCOUNTER — APPOINTMENT (OUTPATIENT)
Dept: PHYSICAL THERAPY | Age: 79
End: 2019-09-16
Attending: INTERNAL MEDICINE
Payer: MEDICARE

## 2019-09-18 ENCOUNTER — APPOINTMENT (OUTPATIENT)
Dept: PHYSICAL THERAPY | Age: 79
End: 2019-09-18
Attending: INTERNAL MEDICINE
Payer: MEDICARE

## 2019-09-23 ENCOUNTER — APPOINTMENT (OUTPATIENT)
Dept: PHYSICAL THERAPY | Age: 79
End: 2019-09-23
Attending: INTERNAL MEDICINE
Payer: MEDICARE

## 2019-09-24 ENCOUNTER — HOSPITAL ENCOUNTER (OUTPATIENT)
Dept: RADIATION ONCOLOGY | Age: 79
Discharge: HOME | End: 2019-09-24
Payer: MEDICARE

## 2019-09-24 VITALS
BODY MASS INDEX: 28.2 KG/M2 | WEIGHT: 172.1 LBS | HEART RATE: 83 BPM | DIASTOLIC BLOOD PRESSURE: 55 MMHG | TEMPERATURE: 98.3 F | SYSTOLIC BLOOD PRESSURE: 132 MMHG | OXYGEN SATURATION: 95 % | RESPIRATION RATE: 16 BRPM

## 2019-09-24 PROCEDURE — 99211 OFF/OP EST MAY X REQ PHY/QHP: CPT

## 2019-09-24 PROCEDURE — 77290 THER RAD SIMULAJ FIELD CPLX: CPT

## 2019-09-24 PROCEDURE — 77332 RADIATION TREATMENT AID(S): CPT

## 2019-09-24 NOTE — PROGRESS NOTES
Patient: Néstor Holly MRN: [id number]  SSN: [id number]    YOB: 1940  Age: [de-identified] y.o. Sex: female      Other Providers: Tom Sarah MD    CHIEF COMPLAINT: Breast cancer    DIAGNOSIS: invasive ductal carcinoma of the right breast, intermediate grade, %, LA 10%, HER-2 equivocal (2+), but HER-2 positive by FISH, clinical T1c N0, now s/p lumpectomy and SLN biopsy revealing 1.2 cm residual IDC, 0/6 SLN, [id number](sn)    HISTORY OF PRESENT ILLNESS:  Néstor Holly is a [de-identified] y.o. female who I am seeing at the request of Dr. Autry Oppenheim. She has a medical history significant for anemia, anxiety, Crohn's disease, GERD, depression, melanoma, hypertension, hypothyroidism, hyperlipidemia. She was found to have an abnormality in the right breast on routine screening mammogram. She has a remote history of breast biopsy with benign finding. Right breast diagnostic mammogram and ultrasound on 03/18/19 revealed spiculated 1.5 cm mass in the upper inner quadrant of the right breast.  Ultrasound revealed an irregular, hypoechoic, approximately 1.5 cm mass at the 2 o'clock position in the right breast 3 cm from the nipple corresponding to mammographic abnormality. This was felt to be highly suggestive of malignancy, BI-RADS 5. Biopsy on 03/29/19 revealed invasive ductal carcinoma, grade 2, %, LA 10%, HER-2 equivocal (2+), but HER-2 positive by FISH. She discussed management options with Dr. Autry Oppenheim and was recommended to undergo chemotherapy given her HER-2 positive status. He recommended neoadjuvant TCH chemotherapy. MRI of the breasts on 04/24/19 showed a 1.8 x 1.6 x 2 cm spiculated appearing enhancing mass underlying the right breast skin marker consistent with the patient's known IDC. There were no other areas of abnormality in either breast.  There was no evidence of lymphadenopathy.      I met with her on 04/25/19 to discuss the potential role of radiation therapy in management of this stage I HER-2 positive right breast cancer. We discussed that frequently radiation therapy can be omitted from [facility] in women over the age of 79. However, because Ms. Carmelo Leo has a HER2 positive breast cancer required chemotherapy I felt somewhat hesitant to omit radiation therapy. We discussed that if she had a pathologic CR to chemotherapy, then omission of radiation therapy would be reasonable. However, if she had considerable residual disease or if she was unable to tolerate chemotherapy, then I would recommend proceeding with adjuvant radiation therapy. INTERVAL HISTORY: Ms. Carmelo Leo returns for further discussion of her stage I HER-2 positive right breast cancer. US after cycle 3 showed significant OR. She completed 6 cycles of TCH chemotherapy. She was then recommended to complete a year of Herceptin. MRI of the breasts on 08/27/19 showed decrease in size of the right medial 2:00 breast cancer now measuring 1.0 x 0.8 x 1.4 cm compared to 1.8 x 1.6 x 2.0 cm previously. There was no evidence of lymphadenopathy and no new suspicious findings. She received an infusion of Herceptin on 09/06/19 and is to receive treatment q 3 weeks. On 09/11/19 she underwent lumpectomy and sentinel lymph node biopsy. Pathology revealed invasive ductal carcinoma, 12 mm, grade 2. Margins were negative. 0/6 sentinel lymph nodes were involved. Staging was [id number](sn). At this time, Ms. Carmelo Leo is doing reasonably well. She has significant anxiety related to her cancer diagnosis. However, she has no systemic complaints and has good functional status. OBSTETRIC HISTORY:    Menarche at the age of 11. G2,P0.     Oral Contraceptive Pills:  None  Hormone Replacement Therapy:  None    PAST MEDICAL HISTORY:    Past Medical History:   Diagnosis Date    Anxiety     Cancer [facility])     melanoma    Cancer of right breast ([facility] [address] Dx 04/2019    Crohn's disease ([facility] [address]     Hypertension     Psychiatric disorder     anxiety/depression    Thyroid disease The patient denies history of collagen vascular diseases, pacemaker insertion, prior radiation or prior chemotherapy. PAST SURGICAL HISTORY:   Past Surgical History:   Procedure Laterality Date    HX HYSTERECTOMY      HX LUMBAR LAMINECTOMY      IR INSERT TUNL CVC W PORT OVER 5 YEARS  4/18/2019       MEDICATIONS:     Current Outpatient Medications:     carbidopa-levodopa (SINEMET)  mg per tablet, Take 1 Tab by mouth three (3) times daily. , Disp: 270 Tab, Rfl: 3    potassium chloride (KLOR-CON) 10 mEq tablet, Take 2 Tabs by mouth two (2) times a day., Disp: 30 Tab, Rfl: 3    venlafaxine-SR (EFFEXOR-XR) 75 mg capsule, , Disp: , Rfl:     LORazepam (ATIVAN) 1 mg tablet, Take 1.5 mg by mouth three (3) times daily. , Disp: , Rfl:     lidocaine-prilocaine (EMLA) topical cream, Apply  to affected area as needed for Pain. Apply to port site 45-60 minutes before lab appointment, Disp: 30 g, Rfl: 2    venlafaxine-SR (EFFEXOR XR) 150 mg capsule, Take  by mouth daily. , Disp: , Rfl:     amLODIPine (NORVASC) 10 mg tablet, Take  by mouth daily. , Disp: , Rfl:     hydrALAZINE (APRESOLINE) 25 mg tablet, Take 25 mg by mouth four (4) times daily. , Disp: , Rfl:     losartan (COZAAR) 50 mg tablet, Take  by mouth daily. , Disp: , Rfl:     hydroCHLOROthiazide (HYDRODIURIL) 25 mg tablet, Take 25 mg by mouth daily. , Disp: , Rfl:     metoprolol tartrate (LOPRESSOR) 25 mg tablet, Take  by mouth two (2) times a day., Disp: , Rfl:     levothyroxine (SYNTHROID) 75 mcg tablet, Take  by mouth Daily (before breakfast). , Disp: , Rfl:     esomeprazole (NEXIUM) 40 mg capsule, Take  by mouth daily. , Disp: , Rfl:     oxybutynin chloride XL (DITROPAN XL) 10 mg CR tablet, Take 10 mg by mouth daily. , Disp: , Rfl:     melatonin 3 mg tablet, Take 6 mg by mouth nightly., Disp: , Rfl:     OLANZapine (ZYPREXA) 5 mg tablet, Take 5 mg by mouth two (2) times a day., Disp: , Rfl:     primidone (MYSOLINE) 50 mg tablet, Take 100 mg by mouth two (2) times a day., Disp: , Rfl:     ALLERGIES:   Allergies   Allergen Reactions    Betadine [Povidone-Iodine] Hives    Pcn [Penicillins] Rash    Pentasa [Mesalamine] Other (comments)     Fever/chills       SOCIAL HISTORY:   Social History     Socioeconomic History    Marital status:      Spouse name: Not on file    Number of children: Not on file    Years of education: Not on file    Highest education level: Not on file   Occupational History    Not on file   Social Needs    Financial resource strain: Not on file    Food insecurity:     Worry: Not on file     Inability: Not on file    Transportation needs:     Medical: Not on file     Non-medical: Not on file   Tobacco Use    Smoking status: Never Smoker    Smokeless tobacco: Never Used   Substance and Sexual Activity    Alcohol use: Never     Frequency: Never    Drug use: Never    Sexual activity: Never   Lifestyle    Physical activity:     Days per week: Not on file     Minutes per session: Not on file    Stress: Not on file   Relationships    Social connections:     Talks on phone: Not on file     Gets together: Not on file     Attends [employer] service: Not on file     Active member of club or organization: Not on file     Attends meetings of clubs or organizations: Not on file     Relationship status: Not on file    Intimate partner violence:     Fear of current or ex partner: Not on file     Emotionally abused: Not on file     Physically abused: Not on file     Forced sexual activity: Not on file   Other Topics Concern     Service Not Asked    Blood Transfusions Not Asked    Caffeine Concern Not Asked    Occupational Exposure Not Asked   Flash Flatten Hazards Not Asked    Sleep Concern Not Asked    Stress Concern Not Asked    Weight Concern Not Asked    Special Diet Not Asked    Back Care Not Asked    Exercise Not Asked    Bike Helmet Not Asked    Seat Belt Not Asked    Self-Exams Not Asked   Social History Narrative    Not on file       FAMILY HISTORY:   Family History   Problem Relation Age of Onset    Cancer Sister         melanoma    Cancer Brother         lung       REVIEW OF SYSTEMS: Please see the completed review of systems sheet in the chart that I have reviewed today. PHYSICAL EXAMINATION:   ECOG Performance status 1  VITAL SIGNS:   Visit Vitals  /55 (BP 1 Location: Left arm, BP Patient Position: Sitting)   Pulse 83   Temp 98.3 °F (36.8 °C)   Resp 16   Wt 78.1 kg (172 lb 1.6 oz)   SpO2 95%   BMI 28.20 kg/m²        GENERAL: The patient is well-developed, ambulatory, alert and in no acute distress. HEENT: Head is normocephalic, atraumatic. Pupils are equal, round and reactive to light and accommodation. Extraocular movement intact. NECK: Neck is supple with no masses. CARDIOVASCULAR: Heart has regular rate and rhythm. There are no murmurs, rubs or gallops. Radial pulses are 2+ RESPIRATORY: Lungs are clear to auscultation and percussion. There is normal respiratory effort. LYMPHATIC: There is no cervical, supraclavicular or axillary lymphadenopathy bilaterally. MUSCULOSKELETAL: Extremities reveal no cyanosis, clubbing or edema.  is 5+/5. BREASTS: Examination of the left breast reveals no dominant nodules or masses. Examination of the right breast reveals a well-healing lumpectomy incision at the nipple with steri-strips still in place. There is no palpable mass. Axillary incision also healing well with no evidence of infection. Steri-strips in place. NEURO:  Cranial nerves II-XII grossly intact. Muscular strength and sensation are intact throughout all four extremities.         PATHOLOGY:    03/29/19:     LABORATORY:   Lab Results   Component Value Date/Time    Sodium 144 09/06/2019 10:02 AM    Potassium 4.2 09/06/2019 10:02 AM    Chloride 110 (H) 09/06/2019 10:02 AM    CO2 24 09/06/2019 10:02 AM    Anion gap 10 09/06/2019 10:02 AM    Glucose 112 (H) 09/06/2019 10:02 AM    BUN 12 09/06/2019 10:02 AM    Creatinine 0.82 09/06/2019 10:02 AM    GFR est AA >60 09/06/2019 10:02 AM    GFR est non-AA >60 09/06/2019 10:02 AM    Calcium 9.0 09/06/2019 10:02 AM    Magnesium 1.7 (L) 09/06/2019 10:02 AM    Albumin 3.4 09/06/2019 10:02 AM    Protein, total 6.7 09/06/2019 10:02 AM    Globulin 3.3 09/06/2019 10:02 AM    A-G Ratio 1.0 (L) 09/06/2019 10:02 AM    AST (SGOT) 7 (L) 09/06/2019 10:02 AM    ALT (SGPT) 16 09/06/2019 10:02 AM     Lab Results   Component Value Date/Time    WBC 6.1 09/06/2019 10:02 AM    HGB 11.1 (L) 09/06/2019 10:02 AM    HCT 35.5 (L) 09/06/2019 10:02 AM    PLATELET 003 (L) 50/08/0845 10:02 AM       RADIOLOGY:       08/27/19: MRI of the Breasts with and without contrast     CLINICAL INDICATION:  Restaging of right medial 2:00 breast cancer after  chemotherapy, no new problems, remote right benign surgical biopsy, personal  history of melanoma, right breast biopsy on 3/29/2019 at an outside facility in  [facility] demonstrating invasive ductal carcinoma at 2:00     COMPARISON: MRI 4/24/2019; mammography and ultrasound 3/29/2019, 3/18/2019     TECHNIQUE: Standard MRI sequences were obtained through the breasts in multiple  planes. Images were obtained before and after intravenous infusion of 17 mL of  Dotarem contrast. Images were reviewed with PACS and with Credport CAD software.     FINDINGS: The breasts demonstrate moderate background glandularity and minimal  enhancement.     Right biopsy clip artifact is again seen at 2:00 middle depth within an  irregular heterogeneously enhancing mass measuring up to 1.0 x 0.8 x 1.4 cm  (previously 1.8 x 1.6 x 2.0 cm). There is preservation of normal fat between the  mass and the overlying skin.     There is no other evidence of suspicious enhancing mass or nonmass enhancement  to suggest additional malignancy in either breast.     There is no evidence of axillary or internal mammary lymphadenopathy.  Chest wall  signal is normal. Elsewhere, limited visualization of the partially included  thorax and upper abdomen shows no concerning findings. Portacatheter is  partially seen on the left.        IMPRESSION  IMPRESSION:   1. Decreased size of right medial 2:00 breast cancer after chemotherapy. 2. No new or suspicious finding otherwise.     Recommend continued management as clinically directed. Annual bilateral  mammogram will be due in February 2020.     BI-RADS Assessment Category 6: Known Biopsy Proven Malignancy            04/24/2019: Mri Breast Bi W Wo Cont  MRI BILATERAL BREASTS WITHOUT AND WITH CONTRAST, 4/24/2019. CLINICAL HISTORY:  New diagnosis of right breast IDC. Technique: Multiplanar multisequence imaging of the breast were performed prior to and following the uneventful intravenous administration of 18 mL of Dotarem. Postcontrast imaging was performed using a dynamic technique. Images were reviewed using the Swapsee. Sequences obtained: Sagittal T2, axial STIR, axial T1, and axial fat-saturated T1-weighted images prior to and following administration of contrast. Comparison studies: Bilateral mammograms 2/13/2019. FINDINGS: A skin marker has been placed at the 3:00 position of the right breast located 2.5 cm from the nipple marking the patient's biopsy site. The breasts are composed of heterogeneous fibroglandular elements. No clearly enlarged or dysmorphic appearing right axillary lymph node is seen. Left axillary lymph nodes appear nonspecific as well. . No enlarged internal mammary lymph nodes are seen. Evaluation of the lungs is limited by  MRI imaging. However, no obvious pulmonary masses are seen. No significant pleural effusions are seen. The liver is obscured by cardiac motion artifact and only partially visualized. However, no focal signal abnormalities are seen prior to, or following administration of contrast to suggest a possible hepatic lesion.   Following the administration of intravenous contrast, normal enhancement is seen of the heart and vascular structures of the breasts. Minimal background physiologic enhancement is seen of the breasts. Underlying the right breast skin marker, there is a 1.8 cm AP by 1.6 cm wide by 2 cm craniocaudal spiculated appearing enhancing mass demonstrating a central biopsy defect consistent with the patient's known right breast IDC. No additional enhancing masses are seen to suggest potential multifocal or multicentric disease. No evidence of skin thickening, or nipple retraction is seen. No enhancing osseous lesion is seen. IMPRESSION: 1.  1.8 cm x 1.6 cm x 2 cm spiculated appearing enhancing mass underlying the right breast skin marker as described above consistent with the patient's known IDC. No additional concerning enhancement is seen to suggest potential multifocal or multicentric disease. 2. No evidence for metastatic disease in the visualized chest. BI-RADS Assessment Category 6: Known Biopsy Proven Malignancy       IMPRESSION:  Sima Vargas is a [de-identified] y.o. female with invasive ductal carcinoma of the right breast, intermediate grade, %, FL 10%, HER-2 equivocal (2+), but HER-2 positive by FISH, clinical T1c N0, now s/p lumpectomy and SLN biopsy revealing 1.2 cm residual IDC, 0/6 SLN, [id number](sn)      COUNSELING AND COORDINATION OF CARE: I have had a 40 minute follow-up with Ms. Blue Zacmartir of which greater than half has been spent counseling her about management options for her Stage I ER/FL positive, HER2 positive right breast cancer. The natural history of breast cancer was again reviewed with the patient. Prognostic features including stage, performance status and presence or absence of lymph node involvement were reviewed with the patient. Various treatment options including lumpectomy alone, breast conservation therapy, and mastectomy were compared and contrasted with regard to outcome and quality of life.   We discussed the data in support of breast conservation therapy, specifically NSABP B-06, which compared mastectomy to lumpectomy plus or minus radiation. This showed no difference in overall survival but improved local regional control with adjuvant radiation. This has become the standard for patient's wishing to conserve the breast.  Subsequent trials have evaluated the role of boost following an initial course and again showed improved control. We have, therefore, historically recommended 50 Gy to the breast followed by a 10 Gy boost to the lumpectomy cavity. There is now also long-term data in support of hypofractionation which has come out of [address] [place] [address] where 3-4 weeks of radiation was compared to the conventional treatment and to date has shown equivalent tumor control and cosmetic outcomes. This is an option for women who were not excluded from the studies or found on subgroup analysis to suffer worse outcomes: women with Grade III tumors, generally women receiving chemotherapy, with large breast size such that significant heterogeneity can not be avoided, and lymph node node positive disease. SHARRI consensus guidelines now support these conclusions. Based on her disease characteristics and anatomy, Ms. Malathi Holguin seems to be a good candidate for a hypofractionated radiation course should she need radiation therapy. For women 79years old or over, data from [address], a CALGB study, has demonstrated no improvement in overall survival with tamoxifen alone as compared to tamoxifen and radiation. No difference in survival has been demonstrated despite a small but statistically significant worsening of local control. For this reason, the omission of radiation is acceptable for many women over the age of 79 who agree to take a 5 year course endocrine therapy. However, because Ms. Malathi Holguin has a HER2 positive breast cancer and will require chemotherapy I am somewhat hesitant to omit radiation therapy.  At her initial consultation we discussed that if she has a pathologic CR to chemotherapy, then omission of radiation therapy would be reasonable. However, if she has considerable residual disease or if she is unable to tolerate chemotherapy, then I would recommend proceeding with adjuvant radiation therapy. Her pathology showed a modest response to chemotherapy with 12 mm residual IDC. Therefore, I have recommended moving forward with radiation therapy. The practicalities, indications, benefits, side-effects and complications of radiation therapy were discussed. Skin changes fatigue, and potential for long-term complications including radiation pneumonitis, and rib fractures were among the complications highlighted. She will receive 4256 cGy to the whole breast in 16 fractions of 266 cGy each followed by a boost to the lumpectomy cavity of 1000 cGy in 4 fractions. If she is not tolerating treatment well we will omit the boost.       I appreciate the opportunity to participate in Ms. Edge's care. Roderick Beltran MD   September 24, 2019      Portions of this note were copied from prior encounters and reviewed for accuracy, currency, and represent documentation and tasks completed during this encounter. I verify and attest these portions to be unchanged from prior visits.     Roderick Beltran MD  09/24/19

## 2019-09-24 NOTE — PROGRESS NOTES
Dr Osman Anne / Herceptin - 09/26/2019  Chemo End - 08/16/2019  Lumpectomy - 09/11/2019  Consult - 04/25/2019 (Dr Jovanni Yin)  Humira - Crohn's - held  Hx Myeloma  MRI 08/27/2019 - decreased in size, breast cancer  Chemo        Galina Nicholas, CMA

## 2019-09-25 ENCOUNTER — APPOINTMENT (OUTPATIENT)
Dept: PHYSICAL THERAPY | Age: 79
End: 2019-09-25
Attending: INTERNAL MEDICINE
Payer: MEDICARE

## 2019-09-25 ENCOUNTER — HOSPITAL ENCOUNTER (OUTPATIENT)
Dept: RADIATION ONCOLOGY | Age: 79
Discharge: HOME | End: 2019-09-25
Payer: MEDICARE

## 2019-09-25 PROCEDURE — 77295 3-D RADIOTHERAPY PLAN: CPT

## 2019-09-25 PROCEDURE — 77300 RADIATION THERAPY DOSE PLAN: CPT

## 2019-09-25 PROCEDURE — 77334 RADIATION TREATMENT AID(S): CPT

## 2019-09-26 ENCOUNTER — HOSPITAL ENCOUNTER (OUTPATIENT)
Dept: LAB | Age: 79
Discharge: HOME | End: 2019-09-26
Payer: MEDICARE

## 2019-09-26 ENCOUNTER — HOSPITAL ENCOUNTER (OUTPATIENT)
Dept: INFUSION THERAPY | Age: 79
Discharge: HOME | End: 2019-09-26
Payer: MEDICARE

## 2019-09-26 ENCOUNTER — PATIENT OUTREACH (OUTPATIENT)
Dept: CASE MANAGEMENT | Age: 79
End: 2019-09-26

## 2019-09-26 DIAGNOSIS — Z17.0 MALIGNANT NEOPLASM OF RIGHT BREAST IN FEMALE, ESTROGEN RECEPTOR POSITIVE, UNSPECIFIED SITE OF BREAST (HCC): Primary | ICD-10-CM

## 2019-09-26 DIAGNOSIS — C50.011 MALIGNANT NEOPLASM OF NIPPLE OF RIGHT BREAST IN FEMALE, ESTROGEN RECEPTOR POSITIVE (HCC): ICD-10-CM

## 2019-09-26 DIAGNOSIS — Z17.0 MALIGNANT NEOPLASM OF NIPPLE OF RIGHT BREAST IN FEMALE, ESTROGEN RECEPTOR POSITIVE (HCC): ICD-10-CM

## 2019-09-26 DIAGNOSIS — C50.911 MALIGNANT NEOPLASM OF RIGHT BREAST IN FEMALE, ESTROGEN RECEPTOR POSITIVE, UNSPECIFIED SITE OF BREAST (HCC): Primary | ICD-10-CM

## 2019-09-26 LAB
ALBUMIN SERPL-MCNC: 3.5 G/DL (ref 3.2–4.6)
ALBUMIN/GLOB SERPL: 1 {RATIO} (ref 1.2–3.5)
ALP SERPL-CCNC: 77 U/L (ref 50–136)
ALT SERPL-CCNC: 10 U/L (ref 12–65)
ANION GAP SERPL CALC-SCNC: 5 MMOL/L (ref 7–16)
AST SERPL-CCNC: 7 U/L (ref 15–37)
BASOPHILS # BLD: 0.1 K/UL (ref 0–0.2)
BASOPHILS NFR BLD: 1 % (ref 0–2)
BILIRUB SERPL-MCNC: 0.2 MG/DL (ref 0.2–1.1)
BUN SERPL-MCNC: 21 MG/DL (ref 8–23)
CALCIUM SERPL-MCNC: 9 MG/DL (ref 8.3–10.4)
CHLORIDE SERPL-SCNC: 111 MMOL/L (ref 98–107)
CO2 SERPL-SCNC: 30 MMOL/L (ref 21–32)
CREAT SERPL-MCNC: 0.97 MG/DL (ref 0.6–1)
DIFFERENTIAL METHOD BLD: ABNORMAL
EOSINOPHIL # BLD: 0.3 K/UL (ref 0–0.8)
EOSINOPHIL NFR BLD: 3 % (ref 0.5–7.8)
ERYTHROCYTE [DISTWIDTH] IN BLOOD BY AUTOMATED COUNT: 14 % (ref 11.9–14.6)
GLOBULIN SER CALC-MCNC: 3.4 G/DL (ref 2.3–3.5)
GLUCOSE SERPL-MCNC: 113 MG/DL (ref 65–100)
HCT VFR BLD AUTO: 37.7 % (ref 35.8–46.3)
HGB BLD-MCNC: 11.8 G/DL (ref 11.7–15.4)
IMM GRANULOCYTES # BLD AUTO: 0 K/UL (ref 0–0.5)
IMM GRANULOCYTES NFR BLD AUTO: 0 % (ref 0–5)
LYMPHOCYTES # BLD: 1.6 K/UL (ref 0.5–4.6)
LYMPHOCYTES NFR BLD: 20 % (ref 13–44)
MAGNESIUM SERPL-MCNC: 1.7 MG/DL (ref 1.8–2.4)
MCH RBC QN AUTO: 33.3 PG (ref 26.1–32.9)
MCHC RBC AUTO-ENTMCNC: 31.3 G/DL (ref 31.4–35)
MCV RBC AUTO: 106.5 FL (ref 79.6–97.8)
MONOCYTES # BLD: 0.5 K/UL (ref 0.1–1.3)
MONOCYTES NFR BLD: 6 % (ref 4–12)
NEUTS SEG # BLD: 5.5 K/UL (ref 1.7–8.2)
NEUTS SEG NFR BLD: 70 % (ref 43–78)
NRBC # BLD: 0 K/UL (ref 0–0.2)
PLATELET # BLD AUTO: 205 K/UL (ref 150–450)
PMV BLD AUTO: 9.4 FL (ref 9.4–12.3)
POTASSIUM SERPL-SCNC: 3.4 MMOL/L (ref 3.5–5.1)
PROT SERPL-MCNC: 6.9 G/DL (ref 6.3–8.2)
RBC # BLD AUTO: 3.54 M/UL (ref 4.05–5.25)
SODIUM SERPL-SCNC: 146 MMOL/L (ref 136–145)
WBC # BLD AUTO: 7.9 K/UL (ref 4.3–11.1)

## 2019-09-26 PROCEDURE — 74011250636 HC RX REV CODE- 250/636: Performed by: INTERNAL MEDICINE

## 2019-09-26 PROCEDURE — 90471 IMMUNIZATION ADMIN: CPT

## 2019-09-26 PROCEDURE — 80053 COMPREHEN METABOLIC PANEL: CPT

## 2019-09-26 PROCEDURE — 96413 CHEMO IV INFUSION 1 HR: CPT

## 2019-09-26 PROCEDURE — 85025 COMPLETE CBC W/AUTO DIFF WBC: CPT

## 2019-09-26 PROCEDURE — 90686 IIV4 VACC NO PRSV 0.5 ML IM: CPT | Performed by: INTERNAL MEDICINE

## 2019-09-26 PROCEDURE — 83735 ASSAY OF MAGNESIUM: CPT

## 2019-09-26 RX ORDER — SODIUM CHLORIDE 9 MG/ML
25 INJECTION, SOLUTION INTRAVENOUS CONTINUOUS
Status: ACTIVE | OUTPATIENT
Start: 2019-09-26 | End: 2019-09-26

## 2019-09-26 RX ORDER — SODIUM CHLORIDE 0.9 % (FLUSH) 0.9 %
10 SYRINGE (ML) INJECTION AS NEEDED
Status: ACTIVE | OUTPATIENT
Start: 2019-09-26 | End: 2019-09-26

## 2019-09-26 RX ADMIN — Medication 10 ML: at 10:55

## 2019-09-26 RX ADMIN — Medication 10 ML: at 12:15

## 2019-09-26 RX ADMIN — SODIUM CHLORIDE 25 ML/HR: 900 INJECTION, SOLUTION INTRAVENOUS at 10:55

## 2019-09-26 RX ADMIN — INFLUENZA VIRUS VACCINE 0.5 ML: 15; 15; 15; 15 SUSPENSION INTRAMUSCULAR at 12:08

## 2019-09-26 RX ADMIN — TRASTUZUMAB 512 MG: 150 INJECTION, POWDER, LYOPHILIZED, FOR SOLUTION INTRAVENOUS at 11:33

## 2019-09-26 NOTE — PROGRESS NOTES
Arrived to the [facility]. Assessment completed. Labs reviewed. Patient tolerated chemotherapy well. She also received the flu vaccine today, as ordered. Any issues or concerns during appointment: none. Patient aware of next infusion appointment on 10/17/19 (date) at 56 (time) with IV infusion center. Discharged ambulatory, with family member. Patient instructed to call Dr. Can Human office immediately for any problems or concerns. She verbalizes understanding.

## 2019-09-26 NOTE — PROGRESS NOTES
9/26/2019 Saw the patient with Dr Con Abebe. She has had surgery a couple of weeks ago and healing well. She asked about resuming Humira and we will hold for a while longer. She did have residual cancer cells and we will change treatment to Kadcyla. She will have 20 radiation treatments. Will continue to follow.

## 2019-09-26 NOTE — PROGRESS NOTES
Massage THERAPY: Daily Note    Referring Physician: Chata Sandoval MD  Medical/Referring Diagnosis: Breast cancer [facility]) [C50.919]   Precautions/Allergies: Betadine [povidone-iodine]; Pcn [penicillins]; and Pentasa [mesalamine]  SUBJECTIVE:  Present Symptoms: Stiffness in hands     Pre-Treatment Pain: 1/10   Neuropathy Scale:  1/10  Past Medical History:    Ms. Rodríguez Valladares  has a past medical history of Anxiety, Cancer ([facility] [address] Cancer of right breast ([facility] [address] (Dx 04/2019), Crohn's disease ([facility] [address] Hypertension, Psychiatric disorder, and Thyroid disease. Ms. Rodríguez Valladares  has a past surgical history that includes hx hysterectomy; hx lumbar laminectomy; and ir insert tunl cvc w port over 5 years (4/18/2019). Current Medications:       Current Outpatient Medications:     dronabinol (MARINOL) 5 mg capsule, , Disp: , Rfl:     carbidopa-levodopa (SINEMET)  mg per tablet, Take 1 Tab by mouth three (3) times daily. , Disp: 270 Tab, Rfl: 3    potassium chloride (KLOR-CON) 10 mEq tablet, Take 2 Tabs by mouth two (2) times a day., Disp: 30 Tab, Rfl: 3    venlafaxine-SR (EFFEXOR-XR) 75 mg capsule, , Disp: , Rfl:     LORazepam (ATIVAN) 1 mg tablet, Take 1.5 mg by mouth three (3) times daily. , Disp: , Rfl:     lidocaine-prilocaine (EMLA) topical cream, Apply  to affected area as needed for Pain. Apply to port site 45-60 minutes before lab appointment, Disp: 30 g, Rfl: 2    venlafaxine-SR (EFFEXOR XR) 150 mg capsule, Take  by mouth daily. , Disp: , Rfl:     amLODIPine (NORVASC) 10 mg tablet, Take  by mouth daily. , Disp: , Rfl:     hydrALAZINE (APRESOLINE) 25 mg tablet, Take 25 mg by mouth four (4) times daily. , Disp: , Rfl:     losartan (COZAAR) 50 mg tablet, Take  by mouth daily. , Disp: , Rfl:     hydroCHLOROthiazide (HYDRODIURIL) 25 mg tablet, Take 25 mg by mouth daily. , Disp: , Rfl:     metoprolol tartrate (LOPRESSOR) 25 mg tablet, Take  by mouth two (2) times a day., Disp: , Rfl:     levothyroxine (SYNTHROID) 75 mcg tablet, Take  by mouth Daily (before breakfast). , Disp: , Rfl:     esomeprazole (NEXIUM) 40 mg capsule, Take  by mouth daily. , Disp: , Rfl:     oxybutynin chloride XL (DITROPAN XL) 10 mg CR tablet, Take 10 mg by mouth daily. , Disp: , Rfl:     melatonin 3 mg tablet, Take 6 mg by mouth nightly., Disp: , Rfl:     OLANZapine (ZYPREXA) 5 mg tablet, Take 5 mg by mouth two (2) times a day., Disp: , Rfl:     Current Facility-Administered Medications:     0.9% sodium chloride infusion, 25 mL/hr, IntraVENous, CONTINUOUS, Saji Molina MD    trastuzumab (HERCEPTIN) 512 mg in 0.9% sodium chloride 250 mL IVPB, 6 mg/kg (Treatment Plan Recorded), IntraVENous, ONCE, Saji Molina MD    saline peripheral flush soln 10 mL, 10 mL, InterCATHeter, PRN, Saji Molina MD       OBJECTIVE/ASSESSMENT:  Observations of Patient:  No issues related to [id number]  Response To Treatment: More relaxed   Post-Treatment Pain: 1/10   Neuropathy Scale:  1/10  TREATMENT:    (In addition to Assessment/Re-Assessment sessions the following treatments were rendered)  Treatment Provided:  [x]  Soft tissue massage  []  Healing Touch   Location: forearm(s) bilaterally and hand(s) bilaterally  Patient Position: Seated  Time: 15 minutes    PLAN OF CARE:    []  I will follow up with this patient as needed. [x]  No follow up visit necessary.     Thank you for this referral.  Jose C Stephens LMT

## 2019-10-03 ENCOUNTER — HOSPITAL ENCOUNTER (OUTPATIENT)
Dept: RADIATION ONCOLOGY | Age: 79
Discharge: HOME | End: 2019-10-03
Payer: MEDICARE

## 2019-10-03 PROCEDURE — 77280 THER RAD SIMULAJ FIELD SMPL: CPT

## 2019-10-03 PROCEDURE — 77412 RADIATION TX DELIVERY LVL 3: CPT

## 2019-10-04 ENCOUNTER — HOSPITAL ENCOUNTER (OUTPATIENT)
Dept: RADIATION ONCOLOGY | Age: 79
Discharge: HOME | End: 2019-10-04
Payer: MEDICARE

## 2019-10-04 PROCEDURE — 77412 RADIATION TX DELIVERY LVL 3: CPT

## 2019-10-07 ENCOUNTER — HOSPITAL ENCOUNTER (OUTPATIENT)
Dept: RADIATION ONCOLOGY | Age: 79
Discharge: HOME | End: 2019-10-07
Payer: MEDICARE

## 2019-10-07 ENCOUNTER — APPOINTMENT (OUTPATIENT)
Dept: RADIATION ONCOLOGY | Age: 79
End: 2019-10-07
Payer: MEDICARE

## 2019-10-07 PROCEDURE — 77412 RADIATION TX DELIVERY LVL 3: CPT

## 2019-10-08 ENCOUNTER — HOSPITAL ENCOUNTER (OUTPATIENT)
Dept: RADIATION ONCOLOGY | Age: 79
Discharge: HOME | End: 2019-10-08
Payer: MEDICARE

## 2019-10-08 ENCOUNTER — APPOINTMENT (OUTPATIENT)
Dept: RADIATION ONCOLOGY | Age: 79
End: 2019-10-08
Payer: MEDICARE

## 2019-10-08 PROCEDURE — 77412 RADIATION TX DELIVERY LVL 3: CPT

## 2019-10-08 NOTE — PROGRESS NOTES
Patient: Maria Elena Garcia MRN: [id number]  SSN: [id number]    YOB: 1940  Age: [de-identified] y.o. Sex: female      DIAGNOSIS:  invasive ductal carcinoma of the right breast, intermediate grade, %, MI 10%, HER-2 equivocal (2+), but HER-2 positive by FISH, clinical T1c N0, now s/p lumpectomy and SLN biopsy revealing 1.2 cm residual IDC, 0/6 SLN, [id number](sn)    SITE TREATED AND DOSE DELIVERED:  Right breast has received 1064 cGy of 4256 cGy in 4/16 fractions. Boost to follow. SUBJECTIVE:  Maria Elena Garcia is a [de-identified] y.o. female being treated for right breast cancer. She is doing well without complaints. OBJECTIVE:  No skin reaction. There were no vitals taken for this visit. Lab Results   Component Value Date/Time    Sodium 146 (H) 09/26/2019 09:12 AM    Potassium 3.4 (L) 09/26/2019 09:12 AM    Chloride 111 (H) 09/26/2019 09:12 AM    CO2 30 09/26/2019 09:12 AM    Anion gap 5 (L) 09/26/2019 09:12 AM    Glucose 113 (H) 09/26/2019 09:12 AM    BUN 21 09/26/2019 09:12 AM    Creatinine 0.97 09/26/2019 09:12 AM    GFR est AA >60 09/26/2019 09:12 AM    GFR est non-AA 59 (L) 09/26/2019 09:12 AM    Calcium 9.0 09/26/2019 09:12 AM    Magnesium 1.7 (L) 09/26/2019 09:12 AM    Albumin 3.5 09/26/2019 09:12 AM    Protein, total 6.9 09/26/2019 09:12 AM    Globulin 3.4 09/26/2019 09:12 AM    A-G Ratio 1.0 (L) 09/26/2019 09:12 AM    AST (SGOT) 7 (L) 09/26/2019 09:12 AM    ALT (SGPT) 10 (L) 09/26/2019 09:12 AM     Lab Results   Component Value Date/Time    WBC 7.9 09/26/2019 09:12 AM    HGB 11.8 09/26/2019 09:12 AM    HCT 37.7 09/26/2019 09:12 AM    PLATELET 345 04/29/4121 09:12 AM       ASSESSMENT and PLAN:  Maria Elena Garcia is tolerating radiation as anticipated for the current dose and fraction. We will continue on as planned with another treatment visit anticipated next week.         Yecenia Lee MD   October 8, 2019

## 2019-10-09 ENCOUNTER — APPOINTMENT (OUTPATIENT)
Dept: RADIATION ONCOLOGY | Age: 79
End: 2019-10-09
Payer: MEDICARE

## 2019-10-09 ENCOUNTER — HOSPITAL ENCOUNTER (OUTPATIENT)
Dept: RADIATION ONCOLOGY | Age: 79
Discharge: HOME | End: 2019-10-09
Payer: MEDICARE

## 2019-10-09 PROCEDURE — 77412 RADIATION TX DELIVERY LVL 3: CPT

## 2019-10-10 ENCOUNTER — APPOINTMENT (OUTPATIENT)
Dept: RADIATION ONCOLOGY | Age: 79
End: 2019-10-10
Payer: MEDICARE

## 2019-10-10 ENCOUNTER — HOSPITAL ENCOUNTER (OUTPATIENT)
Dept: RADIATION ONCOLOGY | Age: 79
Discharge: HOME | End: 2019-10-10
Payer: MEDICARE

## 2019-10-10 PROCEDURE — 77336 RADIATION PHYSICS CONSULT: CPT

## 2019-10-10 PROCEDURE — 77417 THER RADIOLOGY PORT IMAGE(S): CPT

## 2019-10-10 PROCEDURE — 77412 RADIATION TX DELIVERY LVL 3: CPT

## 2019-10-11 ENCOUNTER — HOSPITAL ENCOUNTER (OUTPATIENT)
Dept: RADIATION ONCOLOGY | Age: 79
Discharge: HOME | End: 2019-10-11
Payer: MEDICARE

## 2019-10-11 ENCOUNTER — APPOINTMENT (OUTPATIENT)
Dept: RADIATION ONCOLOGY | Age: 79
End: 2019-10-11
Payer: MEDICARE

## 2019-10-11 PROCEDURE — 77412 RADIATION TX DELIVERY LVL 3: CPT

## 2019-10-14 ENCOUNTER — APPOINTMENT (OUTPATIENT)
Dept: RADIATION ONCOLOGY | Age: 79
End: 2019-10-14
Payer: MEDICARE

## 2019-10-14 ENCOUNTER — HOSPITAL ENCOUNTER (OUTPATIENT)
Dept: RADIATION ONCOLOGY | Age: 79
Discharge: HOME | End: 2019-10-14
Payer: MEDICARE

## 2019-10-14 PROCEDURE — 77412 RADIATION TX DELIVERY LVL 3: CPT

## 2019-10-15 ENCOUNTER — APPOINTMENT (OUTPATIENT)
Dept: RADIATION ONCOLOGY | Age: 79
End: 2019-10-15
Payer: MEDICARE

## 2019-10-15 ENCOUNTER — HOSPITAL ENCOUNTER (OUTPATIENT)
Dept: RADIATION ONCOLOGY | Age: 79
Discharge: HOME | End: 2019-10-15
Payer: MEDICARE

## 2019-10-15 PROCEDURE — 77412 RADIATION TX DELIVERY LVL 3: CPT

## 2019-10-15 NOTE — PROGRESS NOTES
Patient: Bud Obando MRN: [id number]  SSN: [id number]    YOB: 1940  Age: [de-identified] y.o. Sex: female      DIAGNOSIS:  invasive ductal carcinoma of the right breast, intermediate grade, %, WI 10%, HER-2 equivocal (2+), but HER-2 positive by FISH, clinical T1c N0, now s/p lumpectomy and SLN biopsy revealing 1.2 cm residual IDC, 0/6 SLN, [id number](sn)    SITE TREATED AND DOSE DELIVERED:  Right breast has received 2394 cGy of 4256 cGy in 9/16 fractions. Boost to follow. SUBJECTIVE:  Bud Obando is a [de-identified] y.o. female being treated for right breast cancer. She is doing well without complaints. OBJECTIVE:  No skin reaction. There were no vitals taken for this visit. Lab Results   Component Value Date/Time    Sodium 146 (H) 09/26/2019 09:12 AM    Potassium 3.4 (L) 09/26/2019 09:12 AM    Chloride 111 (H) 09/26/2019 09:12 AM    CO2 30 09/26/2019 09:12 AM    Anion gap 5 (L) 09/26/2019 09:12 AM    Glucose 113 (H) 09/26/2019 09:12 AM    BUN 21 09/26/2019 09:12 AM    Creatinine 0.97 09/26/2019 09:12 AM    GFR est AA >60 09/26/2019 09:12 AM    GFR est non-AA 59 (L) 09/26/2019 09:12 AM    Calcium 9.0 09/26/2019 09:12 AM    Magnesium 1.7 (L) 09/26/2019 09:12 AM    Albumin 3.5 09/26/2019 09:12 AM    Protein, total 6.9 09/26/2019 09:12 AM    Globulin 3.4 09/26/2019 09:12 AM    A-G Ratio 1.0 (L) 09/26/2019 09:12 AM    AST (SGOT) 7 (L) 09/26/2019 09:12 AM    ALT (SGPT) 10 (L) 09/26/2019 09:12 AM     Lab Results   Component Value Date/Time    WBC 7.9 09/26/2019 09:12 AM    HGB 11.8 09/26/2019 09:12 AM    HCT 37.7 09/26/2019 09:12 AM    PLATELET 336 70/97/4617 09:12 AM       ASSESSMENT and PLAN:  Bud Obando is tolerating radiation as anticipated for the current dose and fraction. We will continue on as planned with another treatment visit anticipated next week.         Cris Browne MD   October 15, 2019

## 2019-10-16 ENCOUNTER — APPOINTMENT (OUTPATIENT)
Dept: RADIATION ONCOLOGY | Age: 79
End: 2019-10-16
Payer: MEDICARE

## 2019-10-16 ENCOUNTER — HOSPITAL ENCOUNTER (OUTPATIENT)
Dept: RADIATION ONCOLOGY | Age: 79
Discharge: HOME | End: 2019-10-16
Payer: MEDICARE

## 2019-10-16 PROCEDURE — 77290 THER RAD SIMULAJ FIELD CPLX: CPT

## 2019-10-16 PROCEDURE — 77014 HC CT SCAN FOR THERAPY GUIDE: CPT

## 2019-10-16 PROCEDURE — 77412 RADIATION TX DELIVERY LVL 3: CPT

## 2019-10-16 PROCEDURE — 77336 RADIATION PHYSICS CONSULT: CPT

## 2019-10-17 ENCOUNTER — APPOINTMENT (OUTPATIENT)
Dept: RADIATION ONCOLOGY | Age: 79
End: 2019-10-17
Payer: MEDICARE

## 2019-10-17 ENCOUNTER — HOSPITAL ENCOUNTER (OUTPATIENT)
Dept: INFUSION THERAPY | Age: 79
Discharge: HOME | End: 2019-10-17
Payer: MEDICARE

## 2019-10-17 ENCOUNTER — HOSPITAL ENCOUNTER (OUTPATIENT)
Dept: RADIATION ONCOLOGY | Age: 79
Discharge: HOME | End: 2019-10-17
Payer: MEDICARE

## 2019-10-17 VITALS
SYSTOLIC BLOOD PRESSURE: 141 MMHG | BODY MASS INDEX: 27.26 KG/M2 | DIASTOLIC BLOOD PRESSURE: 61 MMHG | WEIGHT: 169.6 LBS | TEMPERATURE: 98.4 F | HEIGHT: 66 IN | HEART RATE: 68 BPM | RESPIRATION RATE: 18 BRPM | OXYGEN SATURATION: 95 %

## 2019-10-17 DIAGNOSIS — Z17.0 MALIGNANT NEOPLASM OF RIGHT BREAST IN FEMALE, ESTROGEN RECEPTOR POSITIVE, UNSPECIFIED SITE OF BREAST (HCC): Primary | ICD-10-CM

## 2019-10-17 DIAGNOSIS — C50.911 MALIGNANT NEOPLASM OF RIGHT BREAST IN FEMALE, ESTROGEN RECEPTOR POSITIVE, UNSPECIFIED SITE OF BREAST (HCC): Primary | ICD-10-CM

## 2019-10-17 LAB
ALBUMIN SERPL-MCNC: 3.4 G/DL (ref 3.2–4.6)
ALBUMIN/GLOB SERPL: 1 {RATIO} (ref 1.2–3.5)
ALP SERPL-CCNC: 74 U/L (ref 50–136)
ALT SERPL-CCNC: 10 U/L (ref 12–65)
ANION GAP SERPL CALC-SCNC: 8 MMOL/L (ref 7–16)
AST SERPL-CCNC: 10 U/L (ref 15–37)
BASOPHILS # BLD: 0 K/UL (ref 0–0.2)
BASOPHILS NFR BLD: 1 % (ref 0–2)
BILIRUB SERPL-MCNC: 0.2 MG/DL (ref 0.2–1.1)
BUN SERPL-MCNC: 20 MG/DL (ref 8–23)
CALCIUM SERPL-MCNC: 9.1 MG/DL (ref 8.3–10.4)
CHLORIDE SERPL-SCNC: 109 MMOL/L (ref 98–107)
CO2 SERPL-SCNC: 28 MMOL/L (ref 21–32)
CREAT SERPL-MCNC: 0.92 MG/DL (ref 0.6–1)
DIFFERENTIAL METHOD BLD: ABNORMAL
EOSINOPHIL # BLD: 0.1 K/UL (ref 0–0.8)
EOSINOPHIL NFR BLD: 3 % (ref 0.5–7.8)
ERYTHROCYTE [DISTWIDTH] IN BLOOD BY AUTOMATED COUNT: 14 % (ref 11.9–14.6)
GLOBULIN SER CALC-MCNC: 3.5 G/DL (ref 2.3–3.5)
GLUCOSE SERPL-MCNC: 120 MG/DL (ref 65–100)
HCT VFR BLD AUTO: 37.2 % (ref 35.8–46.3)
HGB BLD-MCNC: 11.9 G/DL (ref 11.7–15.4)
IMM GRANULOCYTES # BLD AUTO: 0 K/UL (ref 0–0.5)
IMM GRANULOCYTES NFR BLD AUTO: 0 % (ref 0–5)
LYMPHOCYTES # BLD: 1 K/UL (ref 0.5–4.6)
LYMPHOCYTES NFR BLD: 25 % (ref 13–44)
MCH RBC QN AUTO: 32.8 PG (ref 26.1–32.9)
MCHC RBC AUTO-ENTMCNC: 32 G/DL (ref 31.4–35)
MCV RBC AUTO: 102.5 FL (ref 79.6–97.8)
MONOCYTES # BLD: 0.4 K/UL (ref 0.1–1.3)
MONOCYTES NFR BLD: 10 % (ref 4–12)
NEUTS SEG # BLD: 2.3 K/UL (ref 1.7–8.2)
NEUTS SEG NFR BLD: 61 % (ref 43–78)
NRBC # BLD: 0 K/UL (ref 0–0.2)
PLATELET # BLD AUTO: 184 K/UL (ref 150–450)
PMV BLD AUTO: 9.8 FL (ref 9.4–12.3)
POTASSIUM SERPL-SCNC: 3.3 MMOL/L (ref 3.5–5.1)
PROT SERPL-MCNC: 6.9 G/DL (ref 6.3–8.2)
RBC # BLD AUTO: 3.63 M/UL (ref 4.05–5.25)
SODIUM SERPL-SCNC: 145 MMOL/L (ref 136–145)
WBC # BLD AUTO: 3.8 K/UL (ref 4.3–11.1)

## 2019-10-17 PROCEDURE — 96413 CHEMO IV INFUSION 1 HR: CPT

## 2019-10-17 PROCEDURE — 74011250636 HC RX REV CODE- 250/636: Performed by: INTERNAL MEDICINE

## 2019-10-17 PROCEDURE — 77412 RADIATION TX DELIVERY LVL 3: CPT

## 2019-10-17 PROCEDURE — 85025 COMPLETE CBC W/AUTO DIFF WBC: CPT

## 2019-10-17 PROCEDURE — 80053 COMPREHEN METABOLIC PANEL: CPT

## 2019-10-17 PROCEDURE — 96375 TX/PRO/DX INJ NEW DRUG ADDON: CPT

## 2019-10-17 PROCEDURE — 77417 THER RADIOLOGY PORT IMAGE(S): CPT

## 2019-10-17 RX ORDER — ONDANSETRON 2 MG/ML
8 INJECTION INTRAMUSCULAR; INTRAVENOUS ONCE
Status: COMPLETED | OUTPATIENT
Start: 2019-10-17 | End: 2019-10-17

## 2019-10-17 RX ORDER — DIPHENHYDRAMINE HYDROCHLORIDE 50 MG/ML
50 INJECTION, SOLUTION INTRAMUSCULAR; INTRAVENOUS AS NEEDED
Status: DISPENSED | OUTPATIENT
Start: 2019-10-17 | End: 2019-10-17

## 2019-10-17 RX ORDER — HYDROCORTISONE SODIUM SUCCINATE 100 MG/2ML
100 INJECTION, POWDER, FOR SOLUTION INTRAMUSCULAR; INTRAVENOUS AS NEEDED
Status: DISPENSED | OUTPATIENT
Start: 2019-10-17 | End: 2019-10-17

## 2019-10-17 RX ORDER — SODIUM CHLORIDE 0.9 % (FLUSH) 0.9 %
10 SYRINGE (ML) INJECTION AS NEEDED
Status: ACTIVE | OUTPATIENT
Start: 2019-10-17 | End: 2019-10-17

## 2019-10-17 RX ORDER — SODIUM CHLORIDE 9 MG/ML
25 INJECTION, SOLUTION INTRAVENOUS CONTINUOUS
Status: ACTIVE | OUTPATIENT
Start: 2019-10-17 | End: 2019-10-17

## 2019-10-17 RX ADMIN — SODIUM CHLORIDE 25 ML/HR: 900 INJECTION, SOLUTION INTRAVENOUS at 11:05

## 2019-10-17 RX ADMIN — ADO-TRASTUZUMAB EMTANSINE 260 MG: 20 INJECTION, POWDER, LYOPHILIZED, FOR SOLUTION INTRAVENOUS at 11:48

## 2019-10-17 RX ADMIN — Medication 10 ML: at 14:50

## 2019-10-17 RX ADMIN — ONDANSETRON 8 MG: 2 INJECTION INTRAMUSCULAR; INTRAVENOUS at 11:16

## 2019-10-17 RX ADMIN — Medication 10 ML: at 11:05

## 2019-10-17 NOTE — PROGRESS NOTES
Arrived to the [facility]. Labs and D1C1 Kadcyla completed. Patient tolerated well. Patient observed for 90 minutes post infusion. No reaction noted. Any issues or concerns during appointment: none. Patient aware of next infusion appointment on 11/7 (date) at 11:00 AM (time). Discharged ambulatory.

## 2019-10-18 ENCOUNTER — HOSPITAL ENCOUNTER (OUTPATIENT)
Dept: RADIATION ONCOLOGY | Age: 79
Discharge: HOME | End: 2019-10-18
Payer: MEDICARE

## 2019-10-18 ENCOUNTER — APPOINTMENT (OUTPATIENT)
Dept: RADIATION ONCOLOGY | Age: 79
End: 2019-10-18
Payer: MEDICARE

## 2019-10-18 PROCEDURE — 77334 RADIATION TREATMENT AID(S): CPT

## 2019-10-18 PROCEDURE — 77321 SPECIAL TELETX PORT PLAN: CPT

## 2019-10-18 PROCEDURE — 77412 RADIATION TX DELIVERY LVL 3: CPT

## 2019-10-21 ENCOUNTER — APPOINTMENT (OUTPATIENT)
Dept: RADIATION ONCOLOGY | Age: 79
End: 2019-10-21
Payer: MEDICARE

## 2019-10-21 ENCOUNTER — HOSPITAL ENCOUNTER (OUTPATIENT)
Dept: RADIATION ONCOLOGY | Age: 79
Discharge: HOME | End: 2019-10-21
Payer: MEDICARE

## 2019-10-21 PROCEDURE — 77412 RADIATION TX DELIVERY LVL 3: CPT

## 2019-10-22 ENCOUNTER — HOSPITAL ENCOUNTER (OUTPATIENT)
Dept: RADIATION ONCOLOGY | Age: 79
Discharge: HOME | End: 2019-10-22
Payer: MEDICARE

## 2019-10-22 ENCOUNTER — APPOINTMENT (OUTPATIENT)
Dept: RADIATION ONCOLOGY | Age: 79
End: 2019-10-22
Payer: MEDICARE

## 2019-10-22 PROCEDURE — 77412 RADIATION TX DELIVERY LVL 3: CPT

## 2019-10-22 NOTE — PROGRESS NOTES
Patient: Kishor Francisco MRN: [id number]  SSN: [id number]    YOB: 1940  Age: [de-identified] y.o. Sex: female      DIAGNOSIS:  invasive ductal carcinoma of the right breast, intermediate grade, %, SD 10%, HER-2 equivocal (2+), but HER-2 positive by FISH, clinical T1c N0, now s/p lumpectomy and SLN biopsy revealing 1.2 cm residual IDC, 0/6 SLN, [id number](sn)    SITE TREATED AND DOSE DELIVERED:  Right breast has received 3724 cGy of 4256 cGy in 14/16 fractions. Boost to follow. SUBJECTIVE:  Kishor Francisco is a [de-identified] y.o. female being treated for right breast cancer. She is doing well without complaints. Week 3 tolerating radiation therapy without difficulty. She had a fall at home while going to the bathroom and hit her tailbone and head on the floor. She is a bit sore, but without significant injuries. OBJECTIVE:  Mild skin reaction. There were no vitals taken for this visit. Lab Results   Component Value Date/Time    Sodium 145 10/17/2019 10:20 AM    Potassium 3.3 (L) 10/17/2019 10:20 AM    Chloride 109 (H) 10/17/2019 10:20 AM    CO2 28 10/17/2019 10:20 AM    Anion gap 8 10/17/2019 10:20 AM    Glucose 120 (H) 10/17/2019 10:20 AM    BUN 20 10/17/2019 10:20 AM    Creatinine 0.92 10/17/2019 10:20 AM    GFR est AA >60 10/17/2019 10:20 AM    GFR est non-AA >60 10/17/2019 10:20 AM    Calcium 9.1 10/17/2019 10:20 AM    Magnesium 1.7 (L) 09/26/2019 09:12 AM    Albumin 3.4 10/17/2019 10:20 AM    Protein, total 6.9 10/17/2019 10:20 AM    Globulin 3.5 10/17/2019 10:20 AM    A-G Ratio 1.0 (L) 10/17/2019 10:20 AM    AST (SGOT) 10 (L) 10/17/2019 10:20 AM    ALT (SGPT) 10 (L) 10/17/2019 10:20 AM     Lab Results   Component Value Date/Time    WBC 3.8 (L) 10/17/2019 10:20 AM    HGB 11.9 10/17/2019 10:20 AM    HCT 37.2 10/17/2019 10:20 AM    PLATELET 399 09/97/7847 10:20 AM       ASSESSMENT and PLAN:  Kishor Francisco is tolerating radiation as anticipated for the current dose and fraction.   We will continue on as planned with another treatment visit anticipated next week. She will use a walker in the house to help prevent further falls.        Lisa Regan MD   October 22, 2019

## 2019-10-23 ENCOUNTER — HOSPITAL ENCOUNTER (OUTPATIENT)
Dept: RADIATION ONCOLOGY | Age: 79
Discharge: HOME | End: 2019-10-23
Payer: MEDICARE

## 2019-10-23 ENCOUNTER — APPOINTMENT (OUTPATIENT)
Dept: RADIATION ONCOLOGY | Age: 79
End: 2019-10-23
Payer: MEDICARE

## 2019-10-23 PROCEDURE — 77331 SPECIAL RADIATION DOSIMETRY: CPT

## 2019-10-23 PROCEDURE — 77412 RADIATION TX DELIVERY LVL 3: CPT

## 2019-10-24 ENCOUNTER — APPOINTMENT (OUTPATIENT)
Dept: RADIATION ONCOLOGY | Age: 79
End: 2019-10-24
Payer: MEDICARE

## 2019-10-24 ENCOUNTER — HOSPITAL ENCOUNTER (OUTPATIENT)
Dept: RADIATION ONCOLOGY | Age: 79
Discharge: HOME | End: 2019-10-24
Payer: MEDICARE

## 2019-10-24 PROCEDURE — 77336 RADIATION PHYSICS CONSULT: CPT

## 2019-10-24 PROCEDURE — 77280 THER RAD SIMULAJ FIELD SMPL: CPT

## 2019-10-24 PROCEDURE — 77412 RADIATION TX DELIVERY LVL 3: CPT

## 2019-10-25 ENCOUNTER — APPOINTMENT (OUTPATIENT)
Dept: RADIATION ONCOLOGY | Age: 79
End: 2019-10-25
Payer: MEDICARE

## 2019-10-25 ENCOUNTER — HOSPITAL ENCOUNTER (OUTPATIENT)
Dept: RADIATION ONCOLOGY | Age: 79
Discharge: HOME | End: 2019-10-25
Payer: MEDICARE

## 2019-10-25 PROCEDURE — 77412 RADIATION TX DELIVERY LVL 3: CPT

## 2019-10-28 ENCOUNTER — HOSPITAL ENCOUNTER (OUTPATIENT)
Dept: RADIATION ONCOLOGY | Age: 79
Discharge: HOME | End: 2019-10-28
Payer: MEDICARE

## 2019-10-28 ENCOUNTER — APPOINTMENT (OUTPATIENT)
Dept: RADIATION ONCOLOGY | Age: 79
End: 2019-10-28
Payer: MEDICARE

## 2019-10-28 PROCEDURE — 77412 RADIATION TX DELIVERY LVL 3: CPT

## 2019-10-28 NOTE — THERAPY DISCHARGE
Nena Dec  : 1940  [place]  Secondary: [address] at [place] [address], [address], [address]  [phone]   [phone]          OUTPATIENT PHYSICAL THERAPY:Discontinuation Summary 2019   ICD-10: Treatment Diagnosis: muscle weakness generalized M62.81  Precautions/Allergies:   Betadine [povidone-iodine]; Pcn [penicillins]; and Pentasa [mesalamine]    TREATMENT PLAN:  Effective Dates: 2019 TO 2019 (90 days). Frequency/Duration: no further visits MEDICAL/REFERRING DIAGNOSIS:  Malignant neoplasm of central portion of right female breast [C50.111]    DATE OF ONSET:   REFERRING PHYSICIAN: Allan Shanks MD MD Orders: oncology rehab  Return MD Appointment: 19     INITIAL ASSESSMENT:  Ms. Garry Griggs presents following treatment for breast cancer. She has had a diagnosis of severe anxiety for 2 years prior to cancer diagnosis. She has developed tremors also. She will be seeing a new neurologist about her anxiety and tremors. She is awaiting an appointment. She is weak and deconditioned due to recent treatment for breast cancer. She will benefit from therapeutic exercises. 19: The patient attended a total of 7 visits. She did not meet all goals. She had to discontinue physical therapy due to upcoming surgery. PROBLEM LIST (Impacting functional limitations):  1. Decreased Strength  2. Decreased Activity Tolerance  3. Increased Fatigue  4. Decreased [place] with Home Exercise Program INTERVENTIONS PLANNED: (Treatment may consist of any combination of the following)  1. Home Exercise Program (HEP)  2. Therapeutic Exercise/Strengthening     GOALS: (Goals have been discussed and agreed upon with patient.)  Short-Term Functional Goals: Time Frame: 4 weeks  1. The patient will be independent with HEP for strength within 4 weeks. Met   2.  The patient will gain 1/2 grade strength of all 4 extremities within 4 weeks. 3. The patient will improve her TUG by 1 second within 4 weeks. 4. The patient will report a fatigue level of 7 or less within 4 weeks. Discharge Goals: Time Frame: 8 weeks  1. The patient will improve her DASH by 5 within 8 weeks. 2. The patient will improve her TUG by 2 seconds within 8 weeks. 3. The patient will improve her 6 minute walk distance by 100' x 1 within 8 weeks. 4. The patient will report a fatigue level of 5 or less within 8 weeks. OUTCOME MEASURE:   Tool Used: 6-MINUTE WALK TEST  Score:  Initial: 975 feet Most Recent: X feet (Date: -- )   Interpretation of Score: Normal range varies but is approximately 9516-1757 Feet      Distance walked: 975 feet               Baseline End of Test   Heart Rate 86 97   Dyspnea (Luana Scale)     Fatigue (Luana Scale) 9 10   SpO2 96 97   /76 149/78         Tool Used: Disabilities of the Arm, Shoulder and Hand (DASH) Questionnaire - Quick Version  Score:  Initial: 18/55  Most Recent: X/55 (Date: -- )   Interpretation of Score: The DASH is designed to measure the activities of daily living in person's with upper extremity dysfunction or pain. Each section is scored on a 1-5 scale, 5 representing the greatest disability. The scores of each section are added together for a total score of 55. Tool Used: TINETTI  Score:  Initial:   Gait: 12/12  Balance: 16/16  TOTAL: 28/28 Most Recent:  Gait: /12  Balance: /16  TOTAL: /28   Interpretation of Score: The maximum score for the gait component is 12 points. The maximum score for the balance component is 16 points. The maximum total score is 28 points. In general, patients who score below 19 are at a high risk for falls. Patients who score in the range of 19-24 indicate that the patient has a risk for falls. Tool Used: Timed Up and Go (TUG)  Score:  Initial: 10 seconds Most Recent: X seconds (Date: -- )   Interpretation of Score:  The test measures, in seconds, the time taken by an individual to stand up from a standard arm chair (seat height 46 cm [18 in], arm height 65 cm [25.6 in]), walk a distance of 3 meters (118 in, approx 10 ft), turn, walk back to the chair and sit down. If the individual takes longer than 14 seconds to complete TUG, this indicates risk for falls. Tool Used: ECOG Performance Survey Score  Score:  Initial: 2 Most Recent:      Interpretation of Score:   0 Fully active, able to carry on all pre-disease performance without restriction   1 Restricted in physically strenuous activity but ambulatory and able to carry out work of a light or sedentary nature, e.g., light house work, office work   2 Ambulatory and capable of all selfcare but unable to carry out any work activities. Up and about more than 50% of waking hours   3 Capable of only limited selfcare, confined to bed or chair more than 50% of waking hours   4 Completely disabled. Cannot carry on any selfcare. Totally confined to bed or chair   5 Dead      Sit to stand x 10 O2 95     Total Duration:  PT Patient Time In/Time Out  Time In: 1505  Time Out: 1549    Rehabilitation Potential For Stated Goals: Good  Regarding Chana Edge's therapy, I certify that the treatment plan above will be carried out by a therapist or under their direction. Thank you for this referral,  Angelica Mcleod, PT          PAIN/SUBJECTIVE:   Initial:    0 Post Session:  0/10   HISTORY:   History of Injury/Illness (Reason for Referral): Anxiety, tremors, chemo, deconditioned, falls  Past Medical History/Comorbidities:   Ms. Emilee Silver  has a past medical history of Anxiety, Cancer ([address] Cancer of right breast ([address] (Dx 04/2019), Crohn's disease ([address] Hypertension, Psychiatric disorder, and Thyroid disease. Ms. Emilee Silver  has a past surgical history that includes hx hysterectomy; hx lumbar laminectomy; and ir insert tunl cvc w port over 5 years (4/18/2019).    Past Medical History:   Diagnosis Date    Anxiety     Cancer ([facility]     melanoma    Cancer of right breast ([facility] Dx 04/2019    Crohn's disease ([facility]     Hypertension     Psychiatric disorder     anxiety/depression    Thyroid disease      Past Surgical History:   Procedure Laterality Date    HX HYSTERECTOMY      HX LUMBAR LAMINECTOMY      IR INSERT TUNL CVC W PORT OVER 5 YEARS  4/18/2019       Social History/Living Environment:     lives with niece  Prior Level of Function/Work/Activity:  Has lived with niece 2 years due to significant anxiety  Dominant Side:         RIGHT   Ambulatory/Rehab Services H2 Model Falls Risk Assessment   Risk Factors:       (5)  History of Recent Falls [w/in 3 months] Ability to Rise from Chair:       (0)  Ability to rise in a single movement   Falls Prevention Plan:       No modifications necessary   Total: (5 or greater = High Risk): 5   ©2010 [facility] [place]. All Rights Reserved. [facility] Patent #[id number]. Federal Law prohibits the replication, distribution or use without written permission from [facility] ice   Current Medications:       Current Outpatient Medications:     dronabinol (MARINOL) 5 mg capsule, , Disp: , Rfl:     carbidopa-levodopa (SINEMET)  mg per tablet, Take 1 Tab by mouth three (3) times daily. , Disp: 270 Tab, Rfl: 3    potassium chloride (KLOR-CON) 10 mEq tablet, Take 2 Tabs by mouth two (2) times a day., Disp: 30 Tab, Rfl: 3    venlafaxine-SR (EFFEXOR-XR) 75 mg capsule, , Disp: , Rfl:     LORazepam (ATIVAN) 1 mg tablet, Take 1.5 mg by mouth three (3) times daily. , Disp: , Rfl:     lidocaine-prilocaine (EMLA) topical cream, Apply  to affected area as needed for Pain. Apply to port site 45-60 minutes before lab appointment, Disp: 30 g, Rfl: 2    venlafaxine-SR (EFFEXOR XR) 150 mg capsule, Take  by mouth daily. , Disp: , Rfl:     amLODIPine (NORVASC) 10 mg tablet, Take  by mouth daily. , Disp: , Rfl:     hydrALAZINE (APRESOLINE) 25 mg tablet, Take 25 mg by mouth four (4) times daily. , Disp: , Rfl:     losartan (COZAAR) 50 mg tablet, Take  by mouth daily. , Disp: , Rfl:     hydroCHLOROthiazide (HYDRODIURIL) 25 mg tablet, Take 25 mg by mouth daily. , Disp: , Rfl:     metoprolol tartrate (LOPRESSOR) 25 mg tablet, Take  by mouth two (2) times a day., Disp: , Rfl:     levothyroxine (SYNTHROID) 75 mcg tablet, Take  by mouth Daily (before breakfast). , Disp: , Rfl:     esomeprazole (NEXIUM) 40 mg capsule, Take  by mouth daily. , Disp: , Rfl:     oxybutynin chloride XL (DITROPAN XL) 10 mg CR tablet, Take 10 mg by mouth daily. , Disp: , Rfl:     melatonin 3 mg tablet, Take 6 mg by mouth nightly., Disp: , Rfl:     OLANZapine (ZYPREXA) 5 mg tablet, Take 5 mg by mouth two (2) times a day., Disp: , Rfl:    Date Last Reviewed:  8/7/19   Number of Personal Factors/Comorbidities that affect the Plan of Care: 1-2: MODERATE COMPLEXITY   EXAMINATION:   ROM:          within functional limits  Strength:          Grossly 4/5 x 4 extremties  Functional Mobility:         Gait/Ambulation:  independent        Transfers:  independent  Balance:          mildly unsteady with gait   Body Structures Involved:  1. Muscles Body Functions Affected:  1. Neuromusculoskeletal Activities and Participation Affected:  1.  None   Number of elements (examined above) that affect the Plan of Care: 1-2: LOW COMPLEXITY   CLINICAL PRESENTATION:   Presentation: Stable and uncomplicated: LOW COMPLEXITY   CLINICAL DECISION MAKING:   Use of outcome tool(s) and clinical judgement create a POC that gives a: Clear prediction of patient's progress: LOW COMPLEXITY

## 2019-10-29 ENCOUNTER — APPOINTMENT (OUTPATIENT)
Dept: RADIATION ONCOLOGY | Age: 79
End: 2019-10-29
Payer: MEDICARE

## 2019-10-29 ENCOUNTER — HOSPITAL ENCOUNTER (OUTPATIENT)
Dept: RADIATION ONCOLOGY | Age: 79
Discharge: HOME | End: 2019-10-29
Payer: MEDICARE

## 2019-10-29 PROCEDURE — 77412 RADIATION TX DELIVERY LVL 3: CPT

## 2019-10-29 NOTE — PROGRESS NOTES
Patient: Dianna Orellana MRN: [id number]  SSN: [id number]    YOB: 1940  Age: [de-identified] y.o. Sex: female      DIAGNOSIS:  invasive ductal carcinoma of the right breast, intermediate grade, %, MI 10%, HER-2 equivocal (2+), but HER-2 positive by FISH, clinical T1c N0, now s/p lumpectomy and SLN biopsy revealing 1.2 cm residual IDC, 0/6 SLN, [id number](sn)    SITE TREATED AND DOSE DELIVERED:  Right breast has received 4256 cGy of 4256 cGy in 16/16 fractions. Boost has received 750 cGy of 1000 cGy in 3/4 fractions. SUBJECTIVE:  Dianna Orellana is a [de-identified] y.o. female being treated for right breast cancer. She is doing well without complaints. Week 3 tolerating radiation therapy without difficulty. She had a fall at home while going to the bathroom and hit her tailbone and head on the floor. She is a bit sore, but without significant injuries. Week 4 doing well. No complaints. OBJECTIVE:  Mild skin reaction. There were no vitals taken for this visit.     Lab Results   Component Value Date/Time    Sodium 145 10/17/2019 10:20 AM    Potassium 3.3 (L) 10/17/2019 10:20 AM    Chloride 109 (H) 10/17/2019 10:20 AM    CO2 28 10/17/2019 10:20 AM    Anion gap 8 10/17/2019 10:20 AM    Glucose 120 (H) 10/17/2019 10:20 AM    BUN 20 10/17/2019 10:20 AM    Creatinine 0.92 10/17/2019 10:20 AM    GFR est AA >60 10/17/2019 10:20 AM    GFR est non-AA >60 10/17/2019 10:20 AM    Calcium 9.1 10/17/2019 10:20 AM    Magnesium 1.7 (L) 09/26/2019 09:12 AM    Albumin 3.4 10/17/2019 10:20 AM    Protein, total 6.9 10/17/2019 10:20 AM    Globulin 3.5 10/17/2019 10:20 AM    A-G Ratio 1.0 (L) 10/17/2019 10:20 AM    AST (SGOT) 10 (L) 10/17/2019 10:20 AM    ALT (SGPT) 10 (L) 10/17/2019 10:20 AM     Lab Results   Component Value Date/Time    WBC 3.8 (L) 10/17/2019 10:20 AM    HGB 11.9 10/17/2019 10:20 AM    HCT 37.2 10/17/2019 10:20 AM    PLATELET 988 48/66/0171 10:20 AM       ASSESSMENT and PLAN:  Chana Edge is tolerating radiation as anticipated for the current dose and fraction. We will continue on as planned. She will complete treatment tomorrow and then return for follow-up in 1-2 months.         Jose Garcia MD   October 29, 2019

## 2019-10-30 ENCOUNTER — HOSPITAL ENCOUNTER (OUTPATIENT)
Dept: RADIATION ONCOLOGY | Age: 79
Discharge: HOME | End: 2019-10-30
Payer: MEDICARE

## 2019-10-30 ENCOUNTER — APPOINTMENT (OUTPATIENT)
Dept: RADIATION ONCOLOGY | Age: 79
End: 2019-10-30
Payer: MEDICARE

## 2019-10-30 PROCEDURE — 77336 RADIATION PHYSICS CONSULT: CPT

## 2019-10-30 PROCEDURE — 77412 RADIATION TX DELIVERY LVL 3: CPT

## 2019-11-01 NOTE — DISCHARGE SUMMARY
Patient: Emilee Devlin MRN: [id number]  SSN: [id number]    YOB: 1940  Age: [de-identified] y.o. Sex: female      Emilee Devlin is a [de-identified] y.o. female who was seen by radiation oncology at the request of Dr. Abdi Streeter. She has a medical history significant for anemia, anxiety, Crohn's disease, GERD, depression, melanoma, hypertension, hypothyroidism, hyperlipidemia. She was found to have an abnormality in the right breast on routine screening mammogram. She has a remote history of breast biopsy with benign finding. Right breast diagnostic mammogram and ultrasound on 03/18/19 revealed spiculated 1.5 cm mass in the upper inner quadrant of the right breast. Ultrasound revealed an irregular, hypoechoic, approximately 1.5 cm mass at the 2 o'clock position in the right breast 3 cm from the nipple corresponding to mammographic abnormality. This was felt to be highly suggestive of malignancy, BI-RADS 5. Biopsy on 03/29/19 revealed invasive ductal carcinoma, grade 2, %, [place] 10%, HER-2 equivocal (2+), but HER-2 positive by FISH. She discussed management options with Dr. Abdi Streeter and was recommended to undergo chemotherapy given her HER-2 positive status. He recommended neoadjuvant TCH chemotherapy. MRI of the breasts on 04/24/19 showed a 1.8 x 1.6 x 2 cm spiculated appearing enhancing mass underlying the right breast skin marker consistent with the patient's known IDC. There were no other areas of abnormality in either breast.  There was no evidence of lymphadenopathy.      Dr. Dustin Fernández met with her on 04/25/19 to discuss the potential role of radiation therapy in management of this stage I HER-2 positive right breast cancer. We discussed that frequently radiation therapy can be omitted from [facility] in women over the age of 79. However, because Ms. Katelynn Oglesby has a HER2 positive breast cancer required chemotherapy I felt somewhat hesitant to omit radiation therapy.  We discussed that if she had a pathologic CR to chemotherapy, then omission of radiation therapy would be reasonable. However, if she had considerable residual disease or if she was unable to tolerate chemotherapy, then Dr. Neida Mars would recommend proceeding with adjuvant radiation therapy. Ms. Darya Reyes returned for further discussion of her stage I HER-2 positive right breast cancer. US after cycle 3 showed significant DE. She completed 6 cycles of TCH chemotherapy. She was then recommended to complete a year of Herceptin. MRI of the breasts on 08/27/19 showed decrease in size of the right medial 2:00 breast cancer now measuring 1.0 x 0.8 x 1.4 cm compared to 1.8 x 1.6 x 2.0 cm previously. There was no evidence of lymphadenopathy and no new suspicious findings. She received an infusion of Herceptin on 09/06/19 and is to receive treatment q 3 weeks.      On 09/11/19 she underwent lumpectomy and sentinel lymph node biopsy. Pathology revealed invasive ductal carcinoma, 12 mm, grade 2. Margins were negative. 0/6 sentinel lymph nodes were involved. Staging was [id number](sn). The recommendation of Dr. Neida Mars was to receive 4256 cGy to the whole breast in 16 fractions of 266 cGy each followed by a boost to the lumpectomy cavity of 1000 cGy in 4 fractions.      Please see the details of her treatment below as well as my plans for future care and surveillance. Please do not hesitate to call with questions or concerns at any time.       DIAGNOSIS:  invasive ductal carcinoma of the right breast, intermediate grade, %, [place] 10%, HER-2 equivocal (2+), but HER-2 positive by FISH, clinical T1c N0, now s/p lumpectomy and SLN biopsy revealing 1.2 cm residual IDC, 0/6 SLN, [id number](sn)     PREVIOUS TREATMENT:    1) neoadjuvant TCH  2) lumpectomy 9/11/2019    TREATMENT DATES:    1) Right breast: 10/3/2019 - 10/24/2019  2) Right breast boost: 10/25/2019 - 10/30/2019    ANATOMIC SITE: Right breast    DOSE:    1) Right breast: 4256 cGy in 16 fractions  2) Right breast boost: 1000 cGy in 4 fractions    BEAM ARRANGEMENT:    1) Right breast: 3D Conformal - 10MV  2) Right breast boost: 3D Conformal - 12E    CHEMOTHERAPY:  None    TREATMENT COURSE:  Chana Edge did well without complaints. She continued to tolerate radiation therapy without difficulty on week 3. She had a fall at home while going to the bathroom and hit her tailbone and head on the floor. She was a bit sore, but without significant injuries. Week 4 doing well. No complaints.      PLAN:  The patient will be seen in follow up in 4 - 8 weeks to assess acute and sub acute side effects.         Kristyn Toussaint, NP

## 2019-11-07 ENCOUNTER — HOSPITAL ENCOUNTER (OUTPATIENT)
Dept: INFUSION THERAPY | Age: 79
Discharge: HOME | End: 2019-11-07
Payer: MEDICARE

## 2019-11-07 ENCOUNTER — PATIENT OUTREACH (OUTPATIENT)
Dept: CASE MANAGEMENT | Age: 79
End: 2019-11-07

## 2019-11-07 ENCOUNTER — HOSPITAL ENCOUNTER (OUTPATIENT)
Dept: LAB | Age: 79
Discharge: HOME | End: 2019-11-07
Payer: MEDICARE

## 2019-11-07 DIAGNOSIS — Z17.0 MALIGNANT NEOPLASM OF NIPPLE OF RIGHT BREAST IN FEMALE, ESTROGEN RECEPTOR POSITIVE (HCC): ICD-10-CM

## 2019-11-07 DIAGNOSIS — Z17.0 MALIGNANT NEOPLASM OF RIGHT BREAST IN FEMALE, ESTROGEN RECEPTOR POSITIVE, UNSPECIFIED SITE OF BREAST (HCC): Primary | ICD-10-CM

## 2019-11-07 DIAGNOSIS — C50.011 MALIGNANT NEOPLASM OF NIPPLE OF RIGHT BREAST IN FEMALE, ESTROGEN RECEPTOR POSITIVE (HCC): ICD-10-CM

## 2019-11-07 DIAGNOSIS — C50.911 MALIGNANT NEOPLASM OF RIGHT BREAST IN FEMALE, ESTROGEN RECEPTOR POSITIVE, UNSPECIFIED SITE OF BREAST (HCC): Primary | ICD-10-CM

## 2019-11-07 LAB
ALBUMIN SERPL-MCNC: 3.5 G/DL (ref 3.2–4.6)
ALBUMIN/GLOB SERPL: 0.9 {RATIO} (ref 1.2–3.5)
ALP SERPL-CCNC: 120 U/L (ref 50–136)
ALT SERPL-CCNC: 17 U/L (ref 12–65)
ANION GAP SERPL CALC-SCNC: 8 MMOL/L (ref 7–16)
AST SERPL-CCNC: 24 U/L (ref 15–37)
BASOPHILS # BLD: 0 K/UL (ref 0–0.2)
BASOPHILS NFR BLD: 1 % (ref 0–2)
BILIRUB SERPL-MCNC: 0.2 MG/DL (ref 0.2–1.1)
BUN SERPL-MCNC: 18 MG/DL (ref 8–23)
CALCIUM SERPL-MCNC: 9.2 MG/DL (ref 8.3–10.4)
CHLORIDE SERPL-SCNC: 108 MMOL/L (ref 98–107)
CO2 SERPL-SCNC: 26 MMOL/L (ref 21–32)
CREAT SERPL-MCNC: 0.85 MG/DL (ref 0.6–1)
DIFFERENTIAL METHOD BLD: ABNORMAL
EOSINOPHIL # BLD: 0.1 K/UL (ref 0–0.8)
EOSINOPHIL NFR BLD: 1 % (ref 0.5–7.8)
ERYTHROCYTE [DISTWIDTH] IN BLOOD BY AUTOMATED COUNT: 14.3 % (ref 11.9–14.6)
GLOBULIN SER CALC-MCNC: 4 G/DL (ref 2.3–3.5)
GLUCOSE SERPL-MCNC: 117 MG/DL (ref 65–100)
HCT VFR BLD AUTO: 39.5 % (ref 35.8–46.3)
HGB BLD-MCNC: 12.6 G/DL (ref 11.7–15.4)
IMM GRANULOCYTES # BLD AUTO: 0 K/UL (ref 0–0.5)
IMM GRANULOCYTES NFR BLD AUTO: 0 % (ref 0–5)
LYMPHOCYTES # BLD: 1.1 K/UL (ref 0.5–4.6)
LYMPHOCYTES NFR BLD: 20 % (ref 13–44)
MAGNESIUM SERPL-MCNC: 1.8 MG/DL (ref 1.8–2.4)
MCH RBC QN AUTO: 31.6 PG (ref 26.1–32.9)
MCHC RBC AUTO-ENTMCNC: 31.9 G/DL (ref 31.4–35)
MCV RBC AUTO: 99 FL (ref 79.6–97.8)
MONOCYTES # BLD: 0.3 K/UL (ref 0.1–1.3)
MONOCYTES NFR BLD: 6 % (ref 4–12)
NEUTS SEG # BLD: 4 K/UL (ref 1.7–8.2)
NEUTS SEG NFR BLD: 72 % (ref 43–78)
NRBC # BLD: 0 K/UL (ref 0–0.2)
PLATELET # BLD AUTO: 270 K/UL (ref 150–450)
PMV BLD AUTO: 9.6 FL (ref 9.4–12.3)
POTASSIUM SERPL-SCNC: 3.3 MMOL/L (ref 3.5–5.1)
PROT SERPL-MCNC: 7.5 G/DL (ref 6.3–8.2)
RBC # BLD AUTO: 3.99 M/UL (ref 4.05–5.25)
SODIUM SERPL-SCNC: 142 MMOL/L (ref 136–145)
WBC # BLD AUTO: 5.5 K/UL (ref 4.3–11.1)

## 2019-11-07 PROCEDURE — 83735 ASSAY OF MAGNESIUM: CPT

## 2019-11-07 PROCEDURE — 96413 CHEMO IV INFUSION 1 HR: CPT

## 2019-11-07 PROCEDURE — 74011250636 HC RX REV CODE- 250/636: Performed by: INTERNAL MEDICINE

## 2019-11-07 PROCEDURE — 96375 TX/PRO/DX INJ NEW DRUG ADDON: CPT

## 2019-11-07 PROCEDURE — 80053 COMPREHEN METABOLIC PANEL: CPT

## 2019-11-07 PROCEDURE — 85025 COMPLETE CBC W/AUTO DIFF WBC: CPT

## 2019-11-07 RX ORDER — SODIUM CHLORIDE 9 MG/ML
25 INJECTION, SOLUTION INTRAVENOUS CONTINUOUS
Status: ACTIVE | OUTPATIENT
Start: 2019-11-07 | End: 2019-11-07

## 2019-11-07 RX ORDER — ONDANSETRON 2 MG/ML
8 INJECTION INTRAMUSCULAR; INTRAVENOUS ONCE
Status: COMPLETED | OUTPATIENT
Start: 2019-11-07 | End: 2019-11-07

## 2019-11-07 RX ORDER — SODIUM CHLORIDE 9 MG/ML
10 INJECTION INTRAMUSCULAR; INTRAVENOUS; SUBCUTANEOUS AS NEEDED
Status: ACTIVE | OUTPATIENT
Start: 2019-11-07 | End: 2019-11-07

## 2019-11-07 RX ADMIN — SODIUM CHLORIDE 10 ML: 9 INJECTION INTRAMUSCULAR; INTRAVENOUS; SUBCUTANEOUS at 11:13

## 2019-11-07 RX ADMIN — ADO-TRASTUZUMAB EMTANSINE 260 MG: 20 INJECTION, POWDER, LYOPHILIZED, FOR SOLUTION INTRAVENOUS at 11:50

## 2019-11-07 RX ADMIN — SODIUM CHLORIDE 25 ML/HR: 900 INJECTION, SOLUTION INTRAVENOUS at 11:13

## 2019-11-07 RX ADMIN — SODIUM CHLORIDE 10 ML: 9 INJECTION INTRAMUSCULAR; INTRAVENOUS; SUBCUTANEOUS at 12:24

## 2019-11-07 RX ADMIN — ONDANSETRON 8 MG: 2 INJECTION INTRAMUSCULAR; INTRAVENOUS at 11:16

## 2019-11-07 NOTE — PROGRESS NOTES
Arrived to the [facility]. Assessment and kadcyla infusion completed. Patient tolerated well. Any issues or concerns during appointment: Patient's potassium noted to be 3.3. Patient stated that she had just ordered her prescription for potassium today and is going to pick it up after her infusion appointment. Reid Benoit RN, called at [phone], to inform of potassium level. Per Alec Gutierrez RN, no replacement needed and to remind patient to pick prescription up. Patient aware of next infusion appointment on 11/29/2019 (date) at 1500 (time) with infusion center. Discharged ambulatory, with daughter. Patient advised to call Dr. Amber Madrigal office if she has any problems or concerns. Patient verbalized understanding.

## 2019-11-07 NOTE — PROGRESS NOTES
11/7/2019 Saw the patient with Dr Antonio Marie. She is due for Kadcyla. She has lost some weight and struggling with desire to eat. She had an echo 10/9 with EF of 65-70%. She will start PT tomorrow. She recently had a bone density that shows osteoporosis. Dr Antonio Marie discussed with her starting Arimidex. We will wait to start arimidex until we obtain report of bone density. Will continue to follow.

## 2019-11-08 ENCOUNTER — HOSPITAL ENCOUNTER (OUTPATIENT)
Dept: PHYSICAL THERAPY | Age: 79
Discharge: HOME | End: 2019-11-08
Attending: PSYCHIATRY & NEUROLOGY
Payer: MEDICARE

## 2019-11-08 DIAGNOSIS — G20 PARKINSON DISEASE (HCC): ICD-10-CM

## 2019-11-08 PROCEDURE — 97162 PT EVAL MOD COMPLEX 30 MIN: CPT

## 2019-11-08 NOTE — THERAPY EVALUATION
Robe Pabon  : 1940  [place]  Secondary: Hernán Melendrez 13 at [place] [address] [facility], [address] [place] [address],[address], [place] [address].  [phone]   [phone]           OUTPATIENT PHYSICAL THERAPY:Initial Assessment 2019   ICD-10: Treatment Diagnosis: Other abnormalities of gait and mobility R26.89  Precautions/Allergies:   Betadine [povidone-iodine]; Pcn [penicillins]; and Pentasa [mesalamine]   TREATMENT PLAN:  Effective Dates: 2019 TO 2020 (90 days). Frequency/Duration: 2 times a week for 60- 90 Day(s) MEDICAL/REFERRING DIAGNOSIS:  Parkinson disease ([facility] [address] [G20]   DATE OF ONSET: about 3 years PD, about a year CA. REFERRING PHYSICIAN: Susanna Long,*  MD Orders: PT for Parkinson's  Return MD Appointment:      INITIAL ASSESSMENT:  Ms. Roberto Newsome presents with weakness, decreased activity tolerance, imbalance, and unsteady gait. Patient is at risk for falls, and has a history of falls. Patient would benefit from PT to address these problems to improve patient's independence and safety with mobility and daily activities. Thank you. PROBLEM LIST (Impacting functional limitations):  1. Decreased Strength  2. Decreased ADL/Functional Activities  3. Decreased Transfer Abilities  4. Decreased Ambulation Ability/Technique  5. Decreased Balance  6. Decreased Activity Tolerance  7. Decreased Flexibility/Joint Mobility  8. Decreased Cognition INTERVENTIONS PLANNED: (Treatment may consist of any combination of the following)  1. Balance Exercise  2. Gait Training  3. Home Exercise Program (HEP)  4. Neuromuscular Re-education/Strengthening  5. Therapeutic Activites  6. Therapeutic Exercise/Strengthening  7. Transfer Training   8. GOALS: (Goals have been discussed and agreed upon with patient.)  Short-Term Functional Goals: Time Frame: 2-4 weeks  1.  Patient will demonstrate independence and compliance with home exercise program to improve balance and strength for daily activities. 2. Patient will increase her score on the Pride Balance Scale to greater than or equal to 44/56 indicating improved safety and decreased fall risk for daily activities. 3.   Discharge Goals: Time Frame: 8-12 weeks  1. Patient will increase her score on the Pride Balance Scale to greater than or equal to 48/56 indicating improved safety and decreased fall risk for daily activities. Patient will ambulate with least assistive device over level and unlevel surfaces without evidence of imbalance to improve safety for daily activities. Patient will demonstrate improved time on Five Time Sit to Stand Test to less than or equal to 14 seconds indicating improved LE strength for daily mobility. Patient will demonstrate improved score on six minute walk test to 800 feet with stable vital signs indicating improved endurance and mobility for daily activities. OUTCOME MEASURE:   Tool Used: Pride Balance Scale  Score:  Initial: 42/56 Most Recent: X/56 (Date: -- )   Interpretation of Score: Each section is scored on a 0-4 scale, 0 representing the patients inability to perform the task and 4 representing independence. The scores of each section are added together for a total score of 56. The higher the patients score, the more independent the patient is. Any score below 45 indicates increased risk for falls. Tool Used: Five Times Sit to Stand (FTSST or 5xSST)   Score:  Initial: 16.56 seconds Most Recent: X seconds (Date: -- )   Interpretation of Score: This is a measure of functional lower limb muscle strength and quantifying functional change of transitional movements. A score of 12 seconds or less indicates a normal level of function for community dwelling older individuals, a score of 15 seconds or more is at risk for fall.      Tool Used: 6-MINUTE WALK TEST  Score:  Initial: 600 feet with RW Most Recent: X feet (Date: -- ) Interpretation of Score: Normal range varies but is approximately 6171-2061 Feet      Distance walked: 600 feet with RW     Baseline End of Test   Heart Rate 101 bpm 107 bpm   Dyspnea (Luana Scale) 1 3   Fatigue (Luana Scale) 6 7-8   SpO2 97% 98%   BP       Score 2133 [phone] 15-0     MEDICAL NECESSITY:   · Patient is expected to demonstrate progress in strength, balance, functional technique and mobility to increase independence with daily activities. REASON FOR SERVICES/OTHER COMMENTS:  · Patient continues to demonstrate capacity to improve strength, gait , balance which will increase independence and increase safety. Total Duration:  PT Patient Time In/Time Out  Time In: 1055  Time Out: 1145    Rehabilitation Potential For Stated Goals: Good  Regarding Chana Edge's therapy, I certify that the treatment plan above will be carried out by a therapist or under their direction. Thank you for this referral,  Guy Dickerson, PT     Referring Physician Signature: Jus Hutchins,* _______________________________ Date _____________     PAIN/SUBJECTIVE:   Initial: Pain Intensity 1: 0 0 Post Session:  0/10   HISTORY:   History of Injury/Illness (Reason for Referral):  Done with the bulk of chemo and radiation. Falls a lot. No pain. Twisted L ankle the other day with fall, no xray. Dizzy spells. BP has been okay. Can get dizzy sitting, turning around makes her dizzy. Happens about daily. Lasts seconds. Using walker in the house now. Has changed to dressing in sitting. 6/10 fatigue level today. Not doing any exercise at home. No AD before treatment. Past Medical History/Comorbidities:   Ms. Dev Velasquez  has a past medical history of Anxiety, Cancer ([address] Cancer of right breast ([address] (Dx 04/2019), Crohn's disease ([facility] [address] Hypertension, Psychiatric disorder, and Thyroid disease.   Ms. Dev Velasquez  has a past surgical history that includes hx hysterectomy; hx lumbar laminectomy; and ir insert tunl cvc w port over 5 years (4/18/2019). Social History/Living Environment:     living with niece right now, a few steps to enter; has own house also, steps to basement but can avoid that. Prior Level of Function/Work/Activity:  Independent with RW  Dominant Side:         RIGHT  Previous Treatment Approaches: Oncology rehab with [facility]   Personal Factors:          Sex:  female        Age:  [de-identified] y.o. Ambulatory/Rehab Services H2 Model Falls Risk Assessment   Risk Factors:       (1)  Dizziness/Vertigo       (5)  History of Recent Falls [w/in 3 months] Ability to Rise from Chair:       (0)  Ability to rise in a single movement   Falls Prevention Plan: Mobility Assistance Device (specify):  RW       Exercise/Equipment Adaptation (specify):  close supervision, rest breaks   Total: (5 or greater = High Risk): 6   ©2010 [facility] . [facility] Patent #[id number]. Federal Law prohibits the replication, distribution or use without written permission from [facility] [employer]   Current Medications:       Current Outpatient Medications:     dronabinol (MARINOL) 5 mg capsule, , Disp: , Rfl:     carbidopa-levodopa (SINEMET)  mg per tablet, Take 1 Tab by mouth three (3) times daily. , Disp: 270 Tab, Rfl: 3    potassium chloride (KLOR-CON) 10 mEq tablet, Take 2 Tabs by mouth two (2) times a day., Disp: 30 Tab, Rfl: 3    venlafaxine-SR (EFFEXOR-XR) 75 mg capsule, , Disp: , Rfl:     LORazepam (ATIVAN) 1 mg tablet, Take 1.5 mg by mouth three (3) times daily. , Disp: , Rfl:     lidocaine-prilocaine (EMLA) topical cream, Apply  to affected area as needed for Pain. Apply to port site 45-60 minutes before lab appointment, Disp: 30 g, Rfl: 2    venlafaxine-SR (EFFEXOR XR) 150 mg capsule, Take  by mouth daily. , Disp: , Rfl:     amLODIPine (NORVASC) 10 mg tablet, Take  by mouth daily. , Disp: , Rfl:     hydrALAZINE (APRESOLINE) 25 mg tablet, Take 25 mg by mouth four (4) times daily. , Disp: , Rfl:     losartan (COZAAR) 50 mg tablet, Take  by mouth daily. , Disp: , Rfl:     hydroCHLOROthiazide (HYDRODIURIL) 25 mg tablet, Take 25 mg by mouth daily. , Disp: , Rfl:     metoprolol tartrate (LOPRESSOR) 25 mg tablet, Take  by mouth two (2) times a day., Disp: , Rfl:     levothyroxine (SYNTHROID) 75 mcg tablet, Take  by mouth Daily (before breakfast). , Disp: , Rfl:     esomeprazole (NEXIUM) 40 mg capsule, Take  by mouth daily. , Disp: , Rfl:     oxybutynin chloride XL (DITROPAN XL) 10 mg CR tablet, Take 10 mg by mouth daily. , Disp: , Rfl:     melatonin 3 mg tablet, Take 6 mg by mouth nightly., Disp: , Rfl:     OLANZapine (ZYPREXA) 5 mg tablet, Take 5 mg by mouth two (2) times a day., Disp: , Rfl:   No current facility-administered medications for this encounter. Date Last Reviewed:  11/8/19   Number of Personal Factors/Comorbidities that affect the Plan of Care: 1-2: MODERATE COMPLEXITY   EXAMINATION:   Observation/Orthostatic Postural Assessment:          WNL  Strength:          4-/5 B LE  Functional Mobility:         Gait/Ambulation:  Patient ambulates at a slow pace with decreased step length and heel strike, shuffling gait. Uses RW, but did not bring AD today since she walked in with her niece        Transfers:  independent        Bed Mobility:  independent        Stairs:  NT  Sensation:         intact  Postural Control & Balance:  · Pride Balance Scale:  42/56.   (A score less than 45/56 indicates high risk of falls)     · Dynamic Gait Index:  /24. (A score less than or equal to19/24 is abnormal and predictive of falls)       Coordination:          WNL    Additional Special Tests:     5x sit to stand: 16.56 seconds   6 minute walk test: 600 feet  o Distance:  600 feet  o Assistive Device:  RW  o Orthosis/Brace:  none  o Amount of Assist:  SBA  o Gait Characteristics:  Shuffling/decreased step length          Body Structures Involved:  1. Nerves  2.  Muscles Body Functions Affected:  1. Cardio  2. Neuromusculoskeletal  3. Movement Related Activities and Participation Affected:  1. General Tasks and Demands  2. Mobility  3. Domestic Life  4.  Community, Social and [place]   Number of elements (examined above) that affect the Plan of Care: 3: MODERATE COMPLEXITY   CLINICAL PRESENTATION:   Presentation: Evolving clinical presentation with changing clinical characteristics: MODERATE COMPLEXITY   CLINICAL DECISION MAKING:   Use of outcome tool(s) and clinical judgement create a POC that gives a: Questionable prediction of patient's progress: MODERATE COMPLEXITY

## 2019-11-13 ENCOUNTER — HOSPITAL ENCOUNTER (OUTPATIENT)
Dept: PHYSICAL THERAPY | Age: 79
Discharge: HOME | End: 2019-11-13
Attending: PSYCHIATRY & NEUROLOGY
Payer: MEDICARE

## 2019-11-13 PROCEDURE — 97112 NEUROMUSCULAR REEDUCATION: CPT

## 2019-11-13 PROCEDURE — 97110 THERAPEUTIC EXERCISES: CPT

## 2019-11-13 NOTE — PROGRESS NOTES
Benay Frankel  : 1940  [place]  Secondary: Hernán Melendrez 13 at [place] [id number] [facility], [address] [place] [address],[address], [facility].  [phone]   [phone]         OUTPATIENT PHYSICAL THERAPY: Daily Treatment Note 2019  Visit Count:  2    ICD-10: Treatment Diagnosis: Other abnormalities of gait and mobility R26.89  Precautions/Allergies:   Betadine [povidone-iodine]; Pcn [penicillins]; and Pentasa [mesalamine]   TREATMENT PLAN:  Effective Dates: 2019 TO 2020 (90 days). Frequency/Duration: 2 times a week for 60- 90 Day(s)    Pre-treatment Symptoms/Complaints:  Did HEP, went well. Fatigue level today 6/10. HR 72, O2 98%  Pain: Initial:   0/10 Post Session:  0/10   Medications Last Reviewed:  2019  Updated Objective Findings:  None Today  TREATMENT:     THERAPEUTIC EXERCISE: (15 minutes):  Exercises per grid below to improve mobility, strength and balance. Required minimal verbal cues to promote proper body alignment, promote proper body posture and promote proper body mechanics. Progressed resistance, repetitions and complexity of movement as indicated. Date:  19 Date:   Date:     Activity/Exercise Parameters Parameters Parameters   Step ups 4 inch step 2 x 10 reps      Standing :  hip flexion, hip abduction, hip extension X 15 reps each leg each exercise     Sit to stand From mat table 4 x 5 reps                                   NEUROMUSCULAR RE-EDUCATION: (25 minutes):  Exercise/activities per grid below to improve balance, coordination, kinesthetic sense, posture and proprioception. Required minimal verbal cues to promote static and dynamic balance in standing.       Activity   Date  19 Date Date Date Date Date   Activity/Exercise   Sets/reps/equipment Sets/reps/  equipment Sets/reps/  equipment Sets/reps/  equipment Sets/reps/  equipment Sets/reps/  equipment   Walking with head turns Walking with head up & down             Step overs     Over 1/2 foam roll x 20 reps fwd and laterally        Step taps     6 inch step x 30 reps  Fwd and cross        Marching           Sidestepping           Crossovers           Gallion           Walking  backwards             Tandem walking           Weaving in/out of cones             Picking up cones             Sports cord             [employer] ball           Figure 8s            Circles right/left           Walking with 360 degree turns           Spirals           Weight shifting:    Left & Right             Weight shifting: Forward & Backward              Static Standing Balance             Standing with feet apart     Blue foams eyes open and closed        Standing with feet together             Standing with feet semitandem   Blue foams eyes open and closed        Standing with feet tandem           Single leg stance           X1/X2 Viewing exercises             Hallpike-Mindy testing for BPPV (Benign Paroxysmal Positional Vertigo)             Cali-Daroff exercises           Canalith Repositioning treatment/Epley Maneuver  for BPPV (Benign Paroxysmal Positional Vertigo)           Smart Equitest Training: See scanned report. [employer]  Treatment/Session Summary:    · Response to Treatment:   Patient tolerated session well. Patient took frequent rest breaks due to fatigue but did well with exercises overall. Continue to progress as tolerated. · Communication/Consultation:  None today  · Equipment provided today:  None today  · Recommendations/Intent for next treatment session: Next visit will focus on therapeutic exercise for strengthening and activity tolerance, balance training.     Total Treatment Billable Duration:  40 minutes  PT Patient Time In/Time Out  Time In: 0930  Time Out: 909 [facility],     Future Appointments   Date Time Provider Milla Betancur   11/15/2019  1:45 PM Rani Wakefield G, PT [facility]   11/19/2019  1:45 PM Paxton Mckeon, PT [facility]   11/22/2019 11:00 AM Paxton Mckeon, PT [facility]   11/25/2019  1:45 PM Paxton Mckeon, PT [facility]   11/29/2019  1:30 PM [facility] [address] [place] [address]   11/29/2019  2:15 PM Ruthie Chavez MD [facility]   11/29/2019  3:00 PM NUR8 [facility]   12/3/2019 10:15 AM Paxton Mckeon, PT [facility]   12/5/2019 10:15 AM Paxton Mckeon, PT [facility]   12/10/2019  1:45 PM Paxton Mckeon, PT [facility]   12/10/2019  2:30 PM Audrey Madrigal NP BSNE BSNE   12/12/2019 11:00 AM Paxton Mckeon, PT [facility]   12/17/2019 10:15 AM Paxton Mckeon, PT [facility]   12/19/2019  9:40 AM GCC OUTREACH INSURANCE GCC[facility] [address]   12/19/2019 10:15 AM Ruthie Chavez MD [facility]   12/19/2019 11:00 AM NUR4 [facility]   12/20/2019 11:00 AM Paxton Mckeon, PT [facility]   12/24/2019  8:30 AM Daja Drummond NP [facility]AVIORAL HE[facility] SERVICES [address]

## 2019-11-15 ENCOUNTER — HOSPITAL ENCOUNTER (OUTPATIENT)
Dept: PHYSICAL THERAPY | Age: 79
Discharge: HOME | End: 2019-11-15
Attending: PSYCHIATRY & NEUROLOGY
Payer: MEDICARE

## 2019-11-15 PROCEDURE — 97112 NEUROMUSCULAR REEDUCATION: CPT

## 2019-11-15 PROCEDURE — 97110 THERAPEUTIC EXERCISES: CPT

## 2019-11-15 NOTE — PROGRESS NOTES
Benay Frankel  : 1940  [place]  Secondary: Hernán Wrightvarunjerica 13 at [address], [address] [place] [address],[address], [address] [facility]  [phone]   [phone]         OUTPATIENT PHYSICAL THERAPY: Daily Treatment Note 11/15/2019  Visit Count:  3    ICD-10: Treatment Diagnosis: Other abnormalities of gait and mobility R26.89  Precautions/Allergies:   Betadine [povidone-iodine]; Pcn [penicillins]; and Pentasa [mesalamine]   TREATMENT PLAN:  Effective Dates: 2019 TO 2020 (90 days). Frequency/Duration: 2 times a week for 60- 90 Day(s)    Pre-treatment Symptoms/Complaints:  Did well after last time. A little dizzy today, comes and goes. Fatigue level today 10. HR 92, O2 97%  Pain: Initial: Pain Intensity 1: 0 0/10 Post Session:  0/10   Medications Last Reviewed:  11/15/2019  Updated Objective Findings:  None Today  TREATMENT:     THERAPEUTIC EXERCISE: (15 minutes):  Exercises per grid below to improve mobility, strength and balance. Required minimal verbal cues to promote proper body alignment, promote proper body posture and promote proper body mechanics. Progressed resistance, repetitions and complexity of movement as indicated. Date:  11/15/19 Date:   Date:     Activity/Exercise Parameters Parameters Parameters   Step ups 6 inch step 2 x 10 reps      Standing :  hip flexion, hip abduction, hip extension X 15 reps each leg each exercise with 2# wts     Sit to stand From mat table 2x10 reps     Nustep Level 3 x 5 minutes                             NEUROMUSCULAR RE-EDUCATION: (25 minutes):  Exercise/activities per grid below to improve balance, coordination, kinesthetic sense, posture and proprioception. Required minimal verbal cues to promote static and dynamic balance in standing.       Activity   Date  11/15/19 Date Date Date Date Date   Activity/Exercise   Sets/reps/equipment Sets/reps/  equipment Sets/reps/  equipment Sets/reps/  equipment Sets/reps/  equipment Sets/reps/  equipment   Walking with head turns             Walking with head up & down             Step overs     Over 1/2 foam roll x 20 reps fwd and laterally        Step taps     6 inch step x 30 reps  Fwd and cross        Marching   In place x 20 reps, no UE assist        Sidestepping   In bars 6 laps x 5 feet, no UE assist        Crossovers           Stringer           Walking  backwards     In bars x 4 laps x 5 feet        Tandem walking           Weaving in/out of cones             Picking up cones             Sports cord             [employer] ball           Figure 8s            Circles right/left           Walking with 360 degree turns           Spirals           Weight shifting:    Left & Right             Weight shifting: Forward & Backward              Static Standing Balance             Standing with feet apart     Blue foams eyes open and closed        Standing with feet together             Standing with feet semitandem   Blue foams eyes open and closed        Standing with feet tandem           Single leg stance   Each leg x 5 sec         X1/X2 Viewing exercises             Hallpike-[place] testing for BPPV (Benign Paroxysmal Positional Vertigo)             Cali-Daroff exercises           Canalith Repositioning treatment/Epley Maneuver  for BPPV (Benign Paroxysmal Positional Vertigo)           Smart Equitest Training: See scanned report. [employer] Portal  Treatment/Session Summary:    · Response to Treatment:   Patient tolerated session well. Patient demonstrated improved confidence with balance today. She needs frequent rest breaks due to fatigue but does well with activities. Continue to progress as tolerated.    · Communication/Consultation:  None today  · Equipment provided today:  None today  · Recommendations/Intent for next treatment session: Next visit will focus on therapeutic exercise for strengthening and activity tolerance, balance training.     Total Treatment Billable Duration:  40 minutes  PT Patient Time In/Time Out  Time In: 4785  Time Out: 4691 [facility] Robbi Soto, LUIS    Future Appointments   Date Time Provider Milla Gypsy   11/19/2019  1:45 PM Damari Posadas, PT [facility]   11/22/2019 11:00 AM Damari Posadas, PT [facility]   11/25/2019  1:45 PM Damari Posadas, PT [facility]   11/29/2019  1:30 PM [address] [place] [address] OUTREACH INSURANCE [facility] [address]   11/29/2019  2:15 PM Rhonda Hsieh MD [facility]   11/29/2019  3:00 PM NUR8 [facility]   12/3/2019 10:15 AM Damari Posadas, PT [facility]   12/5/2019 10:15 AM Damari Posadas, PT [facility]   12/10/2019  1:45 PM Damari Posadas, PT [facility]   12/10/2019  2:30 PM ALBERT Gong BSNE   12/12/2019 11:00 AM Damari Posadas, PT [facility]   12/17/2019 10:15 AM Damari Posadas, PT [facility]   12/19/2019  9:40 AM GCC OUTREACH INSURANCE GCC[facility] [address]   12/19/2019 10:15 AM Rhonda Hsieh MD [facility]   12/19/2019 11:00 AM NUR4 [facility]   12/20/2019 11:00 AM Damari Posadas, PT [facility]   12/24/2019  8:30 AM Eve Hardy NP [facility] BEHAVIORAL HEALTH SERVICES [address]

## 2019-11-19 ENCOUNTER — HOSPITAL ENCOUNTER (OUTPATIENT)
Dept: PHYSICAL THERAPY | Age: 79
Discharge: HOME | End: 2019-11-19
Attending: PSYCHIATRY & NEUROLOGY
Payer: MEDICARE

## 2019-11-19 PROCEDURE — 97110 THERAPEUTIC EXERCISES: CPT

## 2019-11-19 PROCEDURE — 97112 NEUROMUSCULAR REEDUCATION: CPT

## 2019-11-19 NOTE — PROGRESS NOTES
Avison Young  : 1940  [place]  Secondary: Hernán Melendrez 13 at [place] [id number] [facility] [address], [address] [place] [address],[address], [facility].  [phone]   [phone]         OUTPATIENT PHYSICAL THERAPY: Daily Treatment Note 2019  Visit Count:  4    ICD-10: Treatment Diagnosis: Other abnormalities of gait and mobility R26.89  Precautions/Allergies:   Betadine [povidone-iodine]; Pcn [penicillins]; and Pentasa [mesalamine]   TREATMENT PLAN:  Effective Dates: 2019 TO 2020 (90 days). Frequency/Duration: 2 times a week for 60- 90 Day(s)    Pre-treatment Symptoms/Complaints:  \"I feel weak today. No particular reason\". Fatigue level today 8/10. HR 92, O2 98%  Pain: Initial: Pain Intensity 1: 0 0/10 Post Session:  0/10   Medications Last Reviewed:  2019  Updated Objective Findings:  None Today  TREATMENT:     THERAPEUTIC EXERCISE: (15 minutes):  Exercises per grid below to improve mobility, strength and balance. Required minimal verbal cues to promote proper body alignment, promote proper body posture and promote proper body mechanics. Progressed resistance, repetitions and complexity of movement as indicated. Date:  19 Date:   Date:     Activity/Exercise Parameters Parameters Parameters   Step ups 6 inch step 2 x 10 reps      Standing :  hip flexion, hip abduction, hip extension X 15 reps each leg each exercise with 0# wts     Sit to stand From mat table 2x10 reps     Nustep Level 2 x 5 minutes                             NEUROMUSCULAR RE-EDUCATION: (25 minutes):  Exercise/activities per grid below to improve balance, coordination, kinesthetic sense, posture and proprioception. Required minimal verbal cues to promote static and dynamic balance in standing.       Activity   Date  19 Date Date Date Date Date   Activity/Exercise   Sets/reps/equipment Sets/reps/  equipment Sets/reps/  equipment Sets/reps/  equipment Sets/reps/  equipment Sets/reps/  equipment   Walking with head turns             Walking with head up & down             Step overs     Over 1/2 foam roll x 20 reps fwd and laterally        Step taps     6 inch step x 30 reps  Fwd and cross        Marching   In place x 20 reps, no UE assist        Sidestepping   In bars 6 laps x 5 feet, no UE assist        Crossovers           [place]           Walking  backwards     In bars x 4 laps x 5 feet        Tandem walking           Weaving in/out of cones             Picking up [place]             Sports cord             [employer] ball           Figure 8s            Circles right/left           Walking with 360 degree turns           Spirals           Weight shifting:    Left & Right             Weight shifting: Forward & Backward              Static Standing Balance             Standing with feet apart     Blue foams eyes open and closed        Standing with feet together             Standing with feet semitandem   Blue foams eyes open and closed        Standing with feet tandem           Single leg stance   Each leg x 5 sec         X1/X2 Viewing exercises             Hallpike-[place] testing for BPPV (Benign Paroxysmal Positional Vertigo)             Cali-Daroff exercises           Canalith Repositioning treatment/Epley Maneuver  for BPPV (Benign Paroxysmal Positional Vertigo)           Smart Equitest Training: See scanned report. [employer] Portal  Treatment/Session Summary:    · Response to Treatment:   Patient tolerated session well. Patient was more fatigued today, so she took a lot of rest breaks. O2 sats and HR remained stable during activities. Continue to progress as tolerated.    · Communication/Consultation:  None today  · Equipment provided today:  None today  · Recommendations/Intent for next treatment session: Next visit will focus on therapeutic exercise for strengthening and activity tolerance, balance training.     Total Treatment Billable Duration:  40 minutes  PT Patient Time In/Time Out  Time In: 8288  Time Out: [address], PT    Future Appointments   Date Time Provider Milla Betancur   11/22/2019 11:00 AM Alberta Nava, PT [facility] SFE   11/25/2019  1:45 PM Lortonny Nava, PT SFEORPT SFE   11/29/2019  1:30 PM [address] [place] [address] OUTREACH INSURANCE GCCOIG [address]   11/29/2019  2:15 PM Theo Luna MD [facility]   11/29/2019  3:00 PM Gerson Zimmerman [address]   12/3/2019 10:15 AM Alberta Nava, PT SFEORPT SFE   12/5/2019 10:15 AM Alberta Nava, PT SFEORPT SFE   12/10/2019  1:45 PM Alberta Nava, PT SFEORPT SFE   12/10/2019  2:30 PM Favio Iverson NP BSNE BSNE   12/12/2019 11:00 AM Alberta Nava, PT SFEORPT SFE   12/17/2019 10:15 AM Alberta Nava, PT SFEORPT SFE   12/19/2019  9:40 AM GCC OUTREACH INSURANCE GCCOIG [address]   12/19/2019 10:15 AM Theo Luna MD [facility]   12/19/2019 11:00 AM NUREphraim GCCOPIG [facility]   12/20/2019 11:00 AM Alberta Nava, PT SFEORPT SFE   12/24/2019  8:30 AM Sagar Rouse NP [facility] BEHAVIORAL HE[facility] SERVICES [address]

## 2019-11-22 ENCOUNTER — HOSPITAL ENCOUNTER (OUTPATIENT)
Dept: PHYSICAL THERAPY | Age: 79
Discharge: HOME | End: 2019-11-22
Attending: PSYCHIATRY & NEUROLOGY
Payer: MEDICARE

## 2019-11-22 PROCEDURE — 97112 NEUROMUSCULAR REEDUCATION: CPT

## 2019-11-22 PROCEDURE — 97110 THERAPEUTIC EXERCISES: CPT

## 2019-11-22 NOTE — PROGRESS NOTES
Shelby [place]  : 1940  [place]  Secondary: Hernán Wrightarnulfo 13 at [address], [address] [place] [address],[address], 7262 [facility]  [phone]   [phone]         OUTPATIENT PHYSICAL THERAPY: Daily Treatment Note 2019  Visit Count:  5    ICD-10: Treatment Diagnosis: Other abnormalities of gait and mobility R26.89  Precautions/Allergies:   Betadine [povidone-iodine]; Pcn [penicillins]; and Pentasa [mesalamine]   TREATMENT PLAN:  Effective Dates: 2019 TO 2020 (90 days). Frequency/Duration: 2 times a week for 60- 90 Day(s)    Pre-treatment Symptoms/Complaints:  \"Doing better today. I forgot to bring my walker but I'm doing okay. \". Fatigue level today 5/10. HR 80, O2 98%  Pain: Initial: Pain Intensity 1: 0 0/10 Post Session:  0/10   Medications Last Reviewed:  2019  Updated Objective Findings:  None Today  TREATMENT:     THERAPEUTIC EXERCISE: (15 minutes):  Exercises per grid below to improve mobility, strength and balance. Required minimal verbal cues to promote proper body alignment, promote proper body posture and promote proper body mechanics. Progressed resistance, repetitions and complexity of movement as indicated. Date:  19   Activity/Exercise Parameters   Step ups 6 inch step x 12 reps each leg    Standing :  hip flexion, hip abduction, hip extension X 15 reps each leg each exercise with 0# wts   Sit to stand From mat table 2x10 reps   Nustep Level 3 x 5 minutes                     NEUROMUSCULAR RE-EDUCATION: (25 minutes):  Exercise/activities per grid below to improve balance, coordination, kinesthetic sense, posture and proprioception. Required minimal verbal cues to promote static and dynamic balance in standing.       Activity   Date  19   Activity/Exercise   Sets/reps/equipment   Walking with head turns        Walking with head up & down        Step overs     Over 1/2 foam roll x 20 reps fwd and laterally   Step taps     6 inch step x 30 reps  Fwd and cross   Marching   In place x 20 reps, no UE assist   Sidestepping   In bars 6 laps x 5 feet, no UE assist   Crossovers      [place]      Walking  backwards     In bars x 4 laps x 5 feet   Tandem walking      Weaving in/out of cones        Picking up cones        Sports cord        [employer]OK ball      Figure 8s       Circles right/left      Walking with 360 degree turns      Spirals      Weight shifting:    Left & Right        Weight shifting: Forward & Backward         Static Standing Balance        Standing with feet apart     Blue foams eyes open and closed   Standing with feet together        Standing with feet semitandem   Blue foams eyes open and closed   Standing with feet tandem      Single leg stance   Each leg x 5 sec    X1/X2 Viewing exercises        Hallpike-[place] testing for BPPV (Benign Paroxysmal Positional Vertigo)        Cali-Daroff exercises      Canalith Repositioning treatment/Epley Maneuver  for BPPV (Benign Paroxysmal Positional Vertigo)      Smart Equitest Training: See scanned report. [employer] Portal  Treatment/Session Summary:    · Response to Treatment:   Patient tolerated session well. Patient had a lower fatigue level today and tolerated exercises and activities much better. Continue to progress as tolerated. · Communication/Consultation:  None today  · Equipment provided today:  None today  · Recommendations/Intent for next treatment session: Next visit will focus on therapeutic exercise for strengthening and activity tolerance, balance training.     Total Treatment Billable Duration:  40 minutes  PT Patient Time In/Time Out  Time In: 1100  Time Out: Viviana Pino    Future Appointments   Date Time Provider Milla Betancur   11/25/2019  1:45 PM Brijesh Rosenberg PT [facility] SFE   11/29/2019  1:30 PM [facility] OUTREACH INSURANCE GCCOIG [place] [facility]   11/29/2019  2:15 PM Marian Regalado Fawn Koyanagi, MD [facility]   11/29/2019  3:00 PM Renatasudarshan Sam [address]   12/3/2019 10:15 AM Chancy Plant, PT SFEORPT SFE   12/5/2019 10:15 AM Chancy Plant, PT SFEORPT SFE   12/10/2019  1:45 PM Chancy Plant, PT SFEORPT SFE   12/10/2019  2:30 PM ALBERT Ortiz BSNE   12/12/2019 11:00 AM Chancy Plant, PT SFEORPT SFE   12/17/2019 10:15 AM Chancy Plant, PT SFEORPT E   12/19/2019  9:40 AM GCC OUTREACH INSURANCE [address]   12/19/2019 10:15 AM Kristy Jones MD [facility]   12/19/2019 11:00 AM NUR4 GCCOPIG [facility]   12/20/2019 11:00 AM Chancy Plant, PT SFEORPT E   12/24/2019  8:30 AM Butler Rinne, NP [facility] BEHAVIORAL HEALTH SERVICES [address]

## 2019-11-25 ENCOUNTER — HOSPITAL ENCOUNTER (OUTPATIENT)
Dept: PHYSICAL THERAPY | Age: 79
Discharge: HOME | End: 2019-11-25
Attending: PSYCHIATRY & NEUROLOGY
Payer: MEDICARE

## 2019-11-25 PROCEDURE — 97112 NEUROMUSCULAR REEDUCATION: CPT

## 2019-11-25 PROCEDURE — 97110 THERAPEUTIC EXERCISES: CPT

## 2019-11-25 NOTE — PROGRESS NOTES
Charis Valerio  : 1940  [place]  Secondary: Hernán Melendrez 13 at [place] [id number] [facility], [address] [place] [address],[address], [facility]. 91.  [phone]   [phone]         OUTPATIENT PHYSICAL THERAPY: Daily Treatment Note 2019  Visit Count:  6    ICD-10: Treatment Diagnosis: Other abnormalities of gait and mobility R26.89  Precautions/Allergies:   Betadine [povidone-iodine]; Pcn [penicillins]; and Pentasa [mesalamine]   TREATMENT PLAN:  Effective Dates: 2019 TO 2020 (90 days). Frequency/Duration: 2 times a week for 60- 90 Day(s)    Pre-treatment Symptoms/Complaints: \"No falls, but got an xray of R foot and I have a broken bone, Vela fracture from 3 weeks ago when I twisted it when I got dizzy and fell on the couch. It doesn't hurt really. Waiting to see orthopedic MD so wearing this cast shoe for now. \". Fatigue level today 6/10. HR 82, O2 98%  Pain: Initial: Pain Intensity 1: 0 0/10 Post Session:  0/10   Medications Last Reviewed:  2019  Updated Objective Findings:  None Today  TREATMENT:     THERAPEUTIC EXERCISE: (15 minutes):  Exercises per grid below to improve mobility, strength and balance. Required minimal verbal cues to promote proper body alignment, promote proper body posture and promote proper body mechanics. Progressed resistance, repetitions and complexity of movement as indicated. Date:  19   Activity/Exercise Parameters   Step ups 6 inch step x 10 reps each leg    Standing :  hip flexion, hip abduction, hip extension X 15 reps each leg each exercise with 0# wts   Sit to stand From mat table 2x10 reps   Nustep Level 3 x 5 minutes                     NEUROMUSCULAR RE-EDUCATION: (25 minutes):  Exercise/activities per grid below to improve balance, coordination, kinesthetic sense, posture and proprioception.   Required minimal verbal cues to promote static and dynamic balance in standing. Activity   Date  11/25/19   Activity/Exercise   Sets/reps/equipment   Walking with head turns        Walking with head up & down        Step overs     Over 1/2 foam roll x 20 reps fwd and laterally   Step taps     6 inch step x 30 reps  Fwd and cross   Marching   In place x 20 reps, no UE assist   Sidestepping   In bars 6 laps x 5 feet, no UE assist   Crossovers      [place]      Walking  backwards     In bars x 4 laps x 5 feet   Tandem walking      Weaving in/out of cones        Picking up cones        Sports cord        [employer]OK ball      Figure 8s       Circles right/left      Walking with 360 degree turns      Spirals      Weight shifting:    Left & Right        Weight shifting: Forward & Backward         Static Standing Balance        Standing with feet apart     Blue foams eyes open and closed   Standing with feet together        Standing with feet semitandem   Blue foams eyes open and closed   Standing with feet tandem      Single leg stance      X1/X2 Viewing exercises        Hallpike-[place] testing for BPPV (Benign Paroxysmal Positional Vertigo)        Cali-Daroff exercises      Canalith Repositioning treatment/Epley Maneuver  for BPPV (Benign Paroxysmal Positional Vertigo)      Smart Equitest Training: See scanned report. [employer] Portal  Treatment/Session Summary:    · Response to Treatment:   Patient tolerated session well. Patient reported feeling tired at end of session but reported no dizziness or pain during session. Patient demonstrated better balance control and activity tolerance today. Continue to progress as tolerated. · Communication/Consultation:  None today  · Equipment provided today:  None today  · Recommendations/Intent for next treatment session: Next visit will focus on therapeutic exercise for strengthening and activity tolerance, balance training.     Total Treatment Billable Duration:  40 minutes  PT Patient Time In/Time Out  Time In: 1340  Time Out: [address] Thresea Oven    Future Appointments   Date Time Provider Milla Gypsy   11/29/2019  1:30 PM [address] [facility] OUTREACH INSURANCE GCCOIG [facility] [address] [facility]   11/29/2019  2:15 PM Kacey Villegas MD [facility]   11/29/2019  3:00 PM Baron Preciado [address]   12/3/2019 10:15 AM Alois Stairs, PT SFEORPT SFE   12/5/2019 10:15 AM Alois Stairs, PT SFEORPT SFE   12/10/2019  1:45 PM Alois Stairs, PT SFEORPT SFE   12/10/2019  2:30 PM ALBERT GoldsteinNE BSNE   12/12/2019 11:00 AM Alois Stairs, PT SFEORPT SFE   12/17/2019 10:15 AM Alois Stairs, PT SFEORPT SFE   12/19/2019  9:40 AM GCC OUTREACH INSURANCE GCCOIG [address]   12/19/2019 10:15 AM Kacey Villegas MD [facility]   12/19/2019 11:00 AM NUR4 GCCOPIG [facility]   12/20/2019 11:00 AM Alois Stairs, PT SFEORPT SFE   12/24/2019  8:30 AM Ambika Mckeon NP [facility] BEHAVIORAL HEALTH SERVICES [address]

## 2019-11-29 ENCOUNTER — HOSPITAL ENCOUNTER (OUTPATIENT)
Dept: INFUSION THERAPY | Age: 79
Discharge: HOME | End: 2019-11-29
Payer: MEDICARE

## 2019-11-29 ENCOUNTER — HOSPITAL ENCOUNTER (OUTPATIENT)
Dept: LAB | Age: 79
Discharge: HOME | End: 2019-11-29
Payer: MEDICARE

## 2019-11-29 VITALS — HEIGHT: 66 IN | BODY MASS INDEX: 27.79 KG/M2

## 2019-11-29 DIAGNOSIS — Z17.0 MALIGNANT NEOPLASM OF NIPPLE OF RIGHT BREAST IN FEMALE, ESTROGEN RECEPTOR POSITIVE (HCC): ICD-10-CM

## 2019-11-29 DIAGNOSIS — C50.011 MALIGNANT NEOPLASM OF NIPPLE OF RIGHT BREAST IN FEMALE, ESTROGEN RECEPTOR POSITIVE (HCC): ICD-10-CM

## 2019-11-29 DIAGNOSIS — C50.911 MALIGNANT NEOPLASM OF RIGHT BREAST IN FEMALE, ESTROGEN RECEPTOR POSITIVE, UNSPECIFIED SITE OF BREAST (HCC): Primary | ICD-10-CM

## 2019-11-29 DIAGNOSIS — E86.0 DEHYDRATION: ICD-10-CM

## 2019-11-29 DIAGNOSIS — Z17.0 MALIGNANT NEOPLASM OF RIGHT BREAST IN FEMALE, ESTROGEN RECEPTOR POSITIVE, UNSPECIFIED SITE OF BREAST (HCC): Primary | ICD-10-CM

## 2019-11-29 DIAGNOSIS — R11.0 NAUSEA: ICD-10-CM

## 2019-11-29 LAB
ALBUMIN SERPL-MCNC: 3.4 G/DL (ref 3.2–4.6)
ALBUMIN/GLOB SERPL: 0.9 {RATIO} (ref 1.2–3.5)
ALP SERPL-CCNC: 91 U/L (ref 50–136)
ALT SERPL-CCNC: 11 U/L (ref 12–65)
ANION GAP SERPL CALC-SCNC: 5 MMOL/L (ref 7–16)
AST SERPL-CCNC: 15 U/L (ref 15–37)
BASOPHILS # BLD: 0 K/UL (ref 0–0.2)
BASOPHILS NFR BLD: 1 % (ref 0–2)
BILIRUB SERPL-MCNC: 0.2 MG/DL (ref 0.2–1.1)
BUN SERPL-MCNC: 17 MG/DL (ref 8–23)
CALCIUM SERPL-MCNC: 8.8 MG/DL (ref 8.3–10.4)
CHLORIDE SERPL-SCNC: 108 MMOL/L (ref 98–107)
CO2 SERPL-SCNC: 28 MMOL/L (ref 21–32)
CREAT SERPL-MCNC: 0.89 MG/DL (ref 0.6–1)
DIFFERENTIAL METHOD BLD: ABNORMAL
EOSINOPHIL # BLD: 0.1 K/UL (ref 0–0.8)
EOSINOPHIL NFR BLD: 1 % (ref 0.5–7.8)
ERYTHROCYTE [DISTWIDTH] IN BLOOD BY AUTOMATED COUNT: 14.6 % (ref 11.9–14.6)
GLOBULIN SER CALC-MCNC: 3.9 G/DL (ref 2.3–3.5)
GLUCOSE SERPL-MCNC: 128 MG/DL (ref 65–100)
HCT VFR BLD AUTO: 37.6 % (ref 35.8–46.3)
HGB BLD-MCNC: 11.8 G/DL (ref 11.7–15.4)
IMM GRANULOCYTES # BLD AUTO: 0 K/UL (ref 0–0.5)
IMM GRANULOCYTES NFR BLD AUTO: 0 % (ref 0–5)
LYMPHOCYTES # BLD: 1.6 K/UL (ref 0.5–4.6)
LYMPHOCYTES NFR BLD: 29 % (ref 13–44)
MAGNESIUM SERPL-MCNC: 2 MG/DL (ref 1.8–2.4)
MCH RBC QN AUTO: 30 PG (ref 26.1–32.9)
MCHC RBC AUTO-ENTMCNC: 31.4 G/DL (ref 31.4–35)
MCV RBC AUTO: 95.7 FL (ref 79.6–97.8)
MONOCYTES # BLD: 0.4 K/UL (ref 0.1–1.3)
MONOCYTES NFR BLD: 7 % (ref 4–12)
NEUTS SEG # BLD: 3.3 K/UL (ref 1.7–8.2)
NEUTS SEG NFR BLD: 62 % (ref 43–78)
NRBC # BLD: 0 K/UL (ref 0–0.2)
PLATELET # BLD AUTO: 261 K/UL (ref 150–450)
PMV BLD AUTO: 9.8 FL (ref 9.4–12.3)
POTASSIUM SERPL-SCNC: 3.6 MMOL/L (ref 3.5–5.1)
PROT SERPL-MCNC: 7.3 G/DL (ref 6.3–8.2)
RBC # BLD AUTO: 3.93 M/UL (ref 4.05–5.25)
SODIUM SERPL-SCNC: 141 MMOL/L (ref 136–145)
WBC # BLD AUTO: 5.4 K/UL (ref 4.3–11.1)

## 2019-11-29 PROCEDURE — 74011250636 HC RX REV CODE- 250/636: Performed by: INTERNAL MEDICINE

## 2019-11-29 PROCEDURE — 96413 CHEMO IV INFUSION 1 HR: CPT

## 2019-11-29 PROCEDURE — 80053 COMPREHEN METABOLIC PANEL: CPT

## 2019-11-29 PROCEDURE — 96375 TX/PRO/DX INJ NEW DRUG ADDON: CPT

## 2019-11-29 PROCEDURE — 83735 ASSAY OF MAGNESIUM: CPT

## 2019-11-29 PROCEDURE — 85025 COMPLETE CBC W/AUTO DIFF WBC: CPT

## 2019-11-29 RX ORDER — SODIUM CHLORIDE 0.9 % (FLUSH) 0.9 %
10 SYRINGE (ML) INJECTION AS NEEDED
Status: DISCONTINUED | OUTPATIENT
Start: 2019-11-29 | End: 2019-11-30 | Stop reason: HOSPADM

## 2019-11-29 RX ORDER — SODIUM CHLORIDE 9 MG/ML
25 INJECTION, SOLUTION INTRAVENOUS CONTINUOUS
Status: DISCONTINUED | OUTPATIENT
Start: 2019-11-29 | End: 2019-11-30 | Stop reason: HOSPADM

## 2019-11-29 RX ORDER — ONDANSETRON 2 MG/ML
8 INJECTION INTRAMUSCULAR; INTRAVENOUS ONCE
Status: COMPLETED | OUTPATIENT
Start: 2019-11-29 | End: 2019-11-29

## 2019-11-29 RX ADMIN — Medication 10 ML: at 15:24

## 2019-11-29 RX ADMIN — Medication 10 ML: at 16:47

## 2019-11-29 RX ADMIN — ADO-TRASTUZUMAB EMTANSINE 260 MG: 20 INJECTION, POWDER, LYOPHILIZED, FOR SOLUTION INTRAVENOUS at 16:04

## 2019-11-29 RX ADMIN — ONDANSETRON 8 MG: 2 INJECTION INTRAMUSCULAR; INTRAVENOUS at 15:33

## 2019-11-29 RX ADMIN — SODIUM CHLORIDE 25 ML/HR: 900 INJECTION, SOLUTION INTRAVENOUS at 15:34

## 2019-11-29 NOTE — PROGRESS NOTES
[facility] complete, labs reviewed. Kadcyla completed. Patient tolerated without problems. Any issues or concerns during appointment: None. Patient aware of next infusion appointment on 12/19/2019 (date) at 80 (time). Discharged ambulatory with friend.

## 2019-12-03 ENCOUNTER — HOSPITAL ENCOUNTER (OUTPATIENT)
Dept: PHYSICAL THERAPY | Age: 79
Discharge: HOME | End: 2019-12-03
Attending: PSYCHIATRY & NEUROLOGY
Payer: MEDICARE

## 2019-12-03 PROCEDURE — 97112 NEUROMUSCULAR REEDUCATION: CPT

## 2019-12-03 PROCEDURE — 97110 THERAPEUTIC EXERCISES: CPT

## 2019-12-03 NOTE — PROGRESS NOTES
Maida Player  : 1940  [place]  Secondary: Hernán Melendrez 13 at [address], [address] [place] [address],[address], [place] [address].  [phone]   [phone]         OUTPATIENT PHYSICAL THERAPY: Daily Treatment Note 12/3/2019  Visit Count:  7    ICD-10: Treatment Diagnosis: Other abnormalities of gait and mobility R26.89  Precautions/Allergies:   Betadine [povidone-iodine]; Pcn [penicillins]; and Pentasa [mesalamine]   TREATMENT PLAN:  Effective Dates: 2019 TO 2020 (90 days). Frequency/Duration: 2 times a week for 60- 90 Day(s)    Pre-treatment Symptoms/Complaints:  \"doing well just tired today\". Fatigue level today 7/10. HR 84 bpm, O2 97%  Pain: Initial: Pain Intensity 1: 0 0/10 Post Session:  0/10   Medications Last Reviewed:  12/3/2019  Updated Objective Findings:  None Today  TREATMENT:     THERAPEUTIC EXERCISE: (15 minutes):  Exercises per grid below to improve mobility, strength and balance. Required minimal verbal cues to promote proper body alignment, promote proper body posture and promote proper body mechanics. Progressed resistance, repetitions and complexity of movement as indicated. Date:  12/3/19   Activity/Exercise Parameters   Step ups 6 inch step x 10 reps each leg    Standing :  hip flexion, hip abduction, hip extension X 15 reps each leg each exercise with 0# wts   Sit to stand From mat table 2x10 reps   Nustep Level 3 x 5 minutes                     NEUROMUSCULAR RE-EDUCATION: (25 minutes):  Exercise/activities per grid below to improve balance, coordination, kinesthetic sense, posture and proprioception. Required minimal verbal cues to promote static and dynamic balance in standing.       Activity   Date  12/3/19   Activity/Exercise   Sets/reps/equipment   Walking with head turns        Walking with head up & down        Step overs     Over 1/2 foam roll x 20 reps fwd and laterally   Step taps 6 inch step x 30 reps  Fwd and cross   Marching   In place x 20 reps, no UE assist   Sidestepping   In bars 6 laps x 5 feet, no UE assist   Crossovers      [place]      Walking  backwards     In bars x 4 laps x 5 feet   Tandem walking      Weaving in/out of cones        Picking up cones        Sports cord        [employer]OK ball      Figure 8s       Circles right/left      Walking with 360 degree turns      Spirals      Weight shifting:    Left & Right        Weight shifting: Forward & Backward         Static Standing Balance        Standing with feet apart     Blue foams eyes open and closed   Standing with feet together        Standing with feet semitandem   Blue foams eyes open and closed   Standing with feet tandem      Single leg stance      X1/X2 Viewing exercises        Hallpike-[place] testing for BPPV (Benign Paroxysmal Positional Vertigo)        Cali-Daroff exercises      Canalith Repositioning treatment/Epley Maneuver  for BPPV (Benign Paroxysmal Positional Vertigo)      Smart Equitest Training: See scanned report. [employer] Portal  Treatment/Session Summary:    · Response to Treatment:   Patient tolerated session well. Patient took several rest breaks during exercises but had no SOB and vital signs remained stable. Continue to progress as tolerated. · Communication/Consultation:  None today  · Equipment provided today:  None today  · Recommendations/Intent for next treatment session: Next visit will focus on therapeutic exercise for strengthening and activity tolerance, balance training.     Total Treatment Billable Duration:  40 minutes  PT Patient Time In/Time Out  Time In: 1015  Time Out: Donta Escalante PT    Future Appointments   Date Time Provider Milla Betancur   12/5/2019 10:15 AM LUIS Rosales   12/10/2019  1:45 PM LUIS Rosales   12/10/2019  2:30 PM ALBERT Pisano   12/12/2019 11:00 AM Nini Escalante Sania Sheridan, PT SFEORPT SFE   12/17/2019 10:15 AM Rocky Congress, PT SFEORPT SFE   12/19/2019 11:00 [facility]   12/20/2019 11:00 AM Rocky Congress, PT SFEORPT SFE   12/24/2019  8:30 AM Jacey Garcia NP [facility] BEHAVIORAL HEALTH SERVICES [facility]   1/10/2020  1:40 PM [facility] OUTREACH INSURANCE GCCOIG [facility]   1/10/2020  2:00 PM Shahab Pedraza MD [facility]   1/10/2020  3:00 PM Radha Jc [facility]

## 2019-12-05 ENCOUNTER — HOSPITAL ENCOUNTER (OUTPATIENT)
Dept: PHYSICAL THERAPY | Age: 79
Discharge: HOME | End: 2019-12-05
Attending: PSYCHIATRY & NEUROLOGY
Payer: MEDICARE

## 2019-12-05 PROCEDURE — 97112 NEUROMUSCULAR REEDUCATION: CPT

## 2019-12-05 PROCEDURE — 97110 THERAPEUTIC EXERCISES: CPT

## 2019-12-05 NOTE — PROGRESS NOTES
Isai Ronquillo  : 1940  [place]  Secondary: Hernán Melendrez 13 at [place] [id number] [facility], [address] [place] [address],[address], [place] [address].  [phone]   [phone]         OUTPATIENT PHYSICAL THERAPY: Daily Treatment Note 2019  Visit Count:  8    ICD-10: Treatment Diagnosis: Other abnormalities of gait and mobility R26.89  Precautions/Allergies:   Betadine [povidone-iodine]; Pcn [penicillins]; and Pentasa [mesalamine]   TREATMENT PLAN:  Effective Dates: 2019 TO 2020 (90 days). Frequency/Duration: 2 times a week for 60- 90 Day(s)    Pre-treatment Symptoms/Complaints:  I did see Dr Susana Sánchez the other day. Said foot would heal and have a new brace/shoe. Fatigue level today 6/10. HR 86 bpm, O2 98%  Pain: Initial: Pain Intensity 1: 0 0/10 Post Session:  0/10   Medications Last Reviewed:  2019  Updated Objective Findings:  None Today  TREATMENT:     THERAPEUTIC EXERCISE: (20 minutes):  Exercises per grid below to improve mobility, strength and balance. Required minimal verbal cues to promote proper body alignment, promote proper body posture and promote proper body mechanics. Progressed resistance, repetitions and complexity of movement as indicated. Date:  19   Activity/Exercise Parameters   Step ups 6 inch step x 10 reps each leg    Standing :  hip flexion, hip abduction, hip extension X 15 reps each leg each exercise with 0# wts   Sit to stand From chair table 2x10 reps   Nustep Level 3 x 5 minutes                     NEUROMUSCULAR RE-EDUCATION: (25 minutes):  Exercise/activities per grid below to improve balance, coordination, kinesthetic sense, posture and proprioception. Required minimal verbal cues to promote static and dynamic balance in standing.       Activity   Date  19   Activity/Exercise   Sets/reps/equipment   Walking with head turns        Walking with head up & down        Step overs     Over 1/2 foam roll x 20 reps fwd and laterally   Step taps     6 inch step x 30 reps  Fwd and cross   Marching   In place x 20 reps, no UE assist   Sidestepping   In bars 6 laps x 5 feet, no UE assist   Crossovers      [place]      Walking  backwards     In bars x 4 laps x 5 feet   Tandem walking      Weaving in/out of cones        Picking up cones        Sports cord        ONEOK ball      Figure 8s       Circles right/left      Walking with 360 degree turns      Spirals      Weight shifting:    Left & Right        Weight shifting: Forward & Backward         Static Standing Balance        Standing with feet apart     Blue foams eyes open and closed   Standing with feet together        Standing with feet semitandem   Blue foams eyes open and closed   Standing with feet tandem      Single leg stance      X1/X2 Viewing exercises        Hallpike-[place] testing for BPPV (Benign Paroxysmal Positional Vertigo)        Cali-Daroff exercises      Canalith Repositioning treatment/Epley Maneuver  for BPPV (Benign Paroxysmal Positional Vertigo)      Smart Equitest Training: See scanned report. [employer] Portal  Treatment/Session Summary:    · Response to Treatment:   Patient tolerated session well. Balance and activity tolerance are improving. Continue to progress as tolerated. · Communication/Consultation:  None today  · Equipment provided today:  None today  · Recommendations/Intent for next treatment session: Next visit will focus on therapeutic exercise for strengthening and activity tolerance, balance training.     Total Treatment Billable Duration:  45 minutes  PT Patient Time In/Time Out  Time In: 1015  Time Out: 4282 Chetan Ortez PT    Future Appointments   Date Time Provider Milla Betancur   12/10/2019  1:45 PM Severo Mallick, PT [facility] SFE   12/10/2019  2:30 PM ALBERT Watts   12/12/2019 11:00 AM Severo Mallick, PT SFEORPT SFE   12/17/2019 10:15 AM Kathy Ortez Cruz Hardin, PT SFEORPT SFE   12/19/2019 11:00 AM NUR4 GCCOPIG [facility]   12/20/2019 11:00 AM Oli Cazares, PT SFEORPT E   12/24/2019  8:30 AM Erin Dominguez NP [facility] BEHAVIORAL HEALTH SERVICES [facility]   1/10/2020  1:40 PM GCC OUTREACH INSURANCE GCCG [facility]   1/10/2020  2:00 PM Erin Angel MD [facility]   1/10/2020  3:00 PM Virginia Nixon [facility]

## 2019-12-10 ENCOUNTER — HOSPITAL ENCOUNTER (OUTPATIENT)
Dept: PHYSICAL THERAPY | Age: 79
Discharge: HOME | End: 2019-12-10
Attending: PSYCHIATRY & NEUROLOGY
Payer: MEDICARE

## 2019-12-10 PROCEDURE — 97110 THERAPEUTIC EXERCISES: CPT

## 2019-12-10 PROCEDURE — 97112 NEUROMUSCULAR REEDUCATION: CPT

## 2019-12-10 NOTE — PROGRESS NOTES
Fernanda Garcia  : 1940  [place]  Secondary: Hernán Melendrez 13 at [place] [id number] [facility], [address] [place] [address],[address] [facility], [place] [address].  [phone]   [phone]         OUTPATIENT PHYSICAL THERAPY: Daily Treatment Note 12/10/2019  Visit Count:  9    ICD-10: Treatment Diagnosis: Other abnormalities of gait and mobility R26.89  Precautions/Allergies:   Betadine [povidone-iodine]; Pcn [penicillins]; and Pentasa [mesalamine]   TREATMENT PLAN:  Effective Dates: 2019 TO 2020 (90 days). Frequency/Duration: 2 times a week for 60- 90 Day(s)    Pre-treatment Symptoms/Complaints: Doing fine. Fatigue level today 6/10. HR 77 bpm, O2 98%  Pain: Initial: Pain Intensity 1: 0 0/10 Post Session:  0/10   Medications Last Reviewed:  12/10/2019  Updated Objective Findings:  progress note  TREATMENT:     THERAPEUTIC EXERCISE: (20 minutes):  Exercises per grid below to improve mobility, strength and balance. Required minimal verbal cues to promote proper body alignment, promote proper body posture and promote proper body mechanics. Progressed resistance, repetitions and complexity of movement as indicated. Date:  12/10/19   Activity/Exercise Parameters   Step ups 6 inch step x 10 reps each leg    Standing :  hip flexion, hip abduction, hip extension    Sit to stand From chair table 2x10 reps   Nustep Level 3 x 5 minutes   Walking in hallway No AD, x 6 minutes                 NEUROMUSCULAR RE-EDUCATION: (25 minutes):  Exercise/activities per grid below to improve balance, coordination, kinesthetic sense, posture and proprioception. Required minimal verbal cues to promote static and dynamic balance in standing.       Activity   Date  12/10/19   Activity/Exercise   Sets/reps/equipment   Walking with head turns        Walking with head up & down        Step overs     Over 1/2 foam roll x 20 reps fwd    Step taps     6 inch step x 30 reps  Fwd and cross   Marching   In place x 20 reps, no UE assist   Sidestepping   I   Crossovers      [place]      Walking  backwards        Tandem walking      Weaving in/out of cones        Picking up cones        Sports cord        ONEOK ball      Figure 8s       Circles right/left      Walking with 360 degree turns      Spirals      Weight shifting:    Left & Right        Weight shifting: Forward & Backward         Static Standing Balance        Standing with feet apart     Blue foams eyes open and closed   Standing with feet together        Standing with feet semitandem   Blue foams eyes open and closed   Standing with feet tandem      Single leg stance      X1/X2 Viewing exercises        Hallpike-Mindy testing for BPPV (Benign Paroxysmal Positional Vertigo)        Cali-Daroff exercises      Canalith Repositioning treatment/Epley Maneuver  for BPPV (Benign Paroxysmal Positional Vertigo)      Smart Equitest Training: See scanned report. [employer]  Treatment/Session Summary:    · Response to Treatment:   Patient tolerated session well. Balance, strength, and activity tolerance have improved as seen from improved scores on outcome measures today. Continue to progress as tolerated. · Communication/Consultation:  None today  · Equipment provided today:  None today  · Recommendations/Intent for next treatment session: Next visit will focus on therapeutic exercise for strengthening and activity tolerance, balance training.     Total Treatment Billable Duration:  45 minutes  PT Patient Time In/Time Out  Time In: 3682  Time Out: [address], PT    Future Appointments   Date Time Provider Milla Betancur   12/13/2019  1:00 PM [facility]   12/17/2019 10:15 AM LUIS Yu DORIS   12/19/2019 11:00 AM NUR4 GCCOPIG [facility]   12/20/2019 11:00 AM LUIS Yu DORIS   12/24/2019  8:30 AM Alec Dougherty NP [facility]IORAL HE[facility] 1/10/2020  1:40 PM [address] [facility] OUTREACH INSURANCE GCCOIG [facility] [address] [facility]   1/10/2020  2:00 PM Ruthie Chavez MD [facility]   1/10/2020  3:00 PM Flakito Briseno [facility] [address] [facility]

## 2019-12-10 NOTE — PROGRESS NOTES
Kristi Patches  : 1940  [place]  Secondary: Hernán Melendrez 13 at [place] [id number] [facility] [address] [facility], [address] [place] [address],[address], [facility].  [phone]   [phone]           OUTPATIENT PHYSICAL THERAPY:Progress Report 12/10/2019   ICD-10: Treatment Diagnosis: Other abnormalities of gait and mobility R26.89  Precautions/Allergies:   Betadine [povidone-iodine]; Pcn [penicillins]; and Pentasa [mesalamine]   TREATMENT PLAN:  Effective Dates: 2019 TO 2020 (90 days). Frequency/Duration: 2 times a week for 60- 90 Day(s) MEDICAL/REFERRING DIAGNOSIS:  Parkinson's disease [G20]   DATE OF ONSET: about 3 years PD, about a year CA. REFERRING PHYSICIAN: Chela Mulligan,*  MD Orders: PT for Parkinson's  Return MD Appointment:      INITIAL ASSESSMENT:  Ms. Nadir Cat presents with weakness, decreased activity tolerance, imbalance, and unsteady gait. Patient is at risk for falls, and has a history of falls. Patient would benefit from PT to address these problems to improve patient's independence and safety with mobility and daily activities. Thank you. PROGRESS NOTE 12/10/19: Ms. Nadir Cat has attended 9 PT sessions from 19 to 12/10/19 for weakness, decreased activity tolerance, imbalance, and unsteady gait. Patient is demonstrating improving balance and activity tolerance. Patient's score on the Pride Balance Scale has improved from 42/56 to 52/56. Her score on the Five Times Sit to Stand Test has improved from 16.56 seconds to 16 seconds. Her score on the six minute walk test has improved from 600 feet with a rolling walker to 975 feet with no assistive device. Patient is progressing well toward goals. Patient verbalizes feeling stronger and having better activity tolerance. Patient would benefit from continuing PT to address these problems to improve patient's independence and safety with mobility and daily activities. Thank you. PROBLEM LIST (Impacting functional limitations):  1. Decreased Strength  2. Decreased ADL/Functional Activities  3. Decreased Transfer Abilities  4. Decreased Ambulation Ability/Technique  5. Decreased Balance  6. Decreased Activity Tolerance  7. Decreased Flexibility/Joint Mobility  8. Decreased Cognition INTERVENTIONS PLANNED: (Treatment may consist of any combination of the following)  1. Balance Exercise  2. Gait Training  3. Home Exercise Program (HEP)  4. Neuromuscular Re-education/Strengthening  5. Therapeutic Activites  6. Therapeutic Exercise/Strengthening  7. Transfer Training   8. GOALS: (Goals have been discussed and agreed upon with patient.)  Short-Term Functional Goals: Time Frame: 2-4 weeks  1. Patient will demonstrate independence and compliance with home exercise program to improve balance and strength for daily activities. MET  2. Patient will increase her score on the Pride Balance Scale to greater than or equal to 44/56 indicating improved safety and decreased fall risk for daily activities. met  Discharge Goals: Time Frame: 8-12 weeks  1. Patient will increase her score on the Pride Balance Scale to greater than or equal to 48/56 indicating improved safety and decreased fall risk for daily activities. Progressing and ongoing  Patient will ambulate with least assistive device over level and unlevel surfaces without evidence of imbalance to improve safety for daily activities. Progressing and ongoing  Patient will demonstrate improved time on Five Time Sit to Stand Test to less than or equal to 14 seconds indicating improved LE strength for daily mobility. Progressing and ongoing  Patient will demonstrate improved score on six minute walk test to 800 feet with stable vital signs indicating improved endurance and mobility for daily activities.  Progressing and ongoing    OUTCOME MEASURE:   Tool Used: Pride Balance Scale  Score:  Initial: 42/56 Most Recent: 52/56 (Date: 12/10/19 ) Interpretation of Score: Each section is scored on a 0-4 scale, 0 representing the patients inability to perform the task and 4 representing independence. The scores of each section are added together for a total score of 56. The higher the patients score, the more independent the patient is. Any score below 45 indicates increased risk for falls. Tool Used: Five Times Sit to Stand (FTSST or 5xSST)   Score:  Initial: 16.56 seconds Most Recent: 16 seconds (Date:12/10/19 )   Interpretation of Score: This is a measure of functional lower limb muscle strength and quantifying functional change of transitional movements. A score of 12 seconds or less indicates a normal level of function for community dwelling older individuals, a score of 15 seconds or more is at risk for fall. Tool Used: 6-MINUTE WALK TEST  Score:  Initial: 600 feet with RW Most Recent:975 feet with no AD (Date: 12/10/19 )   Interpretation of Score: Normal range varies but is approximately 8141-0198 Feet      Distance walked: 975 feet with No AD     Baseline End of Test   Heart Rate 78 bpm 84 bpm   Dyspnea (Luana Scale) 0 3   Fatigue (Luana Scale) 6 7   SpO2 98% 97%   BP       Score [id number] 6033-3439 [phone] 15-0     MEDICAL NECESSITY:   · Patient is expected to demonstrate progress in strength, balance, functional technique and mobility to increase independence with daily activities. REASON FOR SERVICES/OTHER COMMENTS:  · Patient continues to demonstrate capacity to improve strength, gait , balance which will increase independence and increase safety. Total Duration:  PT Patient Time In/Time Out  Time In: 1345  Time Out: 1430    Rehabilitation Potential For Stated Goals: Good  Regarding Chana Edge's therapy, I certify that the treatment plan above will be carried out by a therapist or under their direction. Thank you for this referral,  Julieth Bender, PT     Referring Physician Signature:  Vee Luis Armando Castro No Signature is Required for this note. PAIN/SUBJECTIVE:   Initial: Pain Intensity 1: 0 0 Post Session:  0/10   HISTORY:   History of Injury/Illness (Reason for Referral):  Done with the bulk of chemo and radiation. Falls a lot. No pain. Twisted L ankle the other day with fall, no xray. Dizzy spells. BP has been okay. Can get dizzy sitting, turning around makes her dizzy. Happens about daily. Lasts seconds. Using walker in the house now. Has changed to dressing in sitting. 6/10 fatigue level today. Not doing any exercise at home. No AD before treatment. Past Medical History/Comorbidities:   Ms. Blayne Larios  has a past medical history of Anxiety, Cancer ([facility] Cancer of right breast ([facility] (Dx 04/2019), Crohn's disease ([facility] Hypertension, Psychiatric disorder, and Thyroid disease. Ms. Blayne Larios  has a past surgical history that includes hx hysterectomy; hx lumbar laminectomy; and ir insert tunl cvc w port over 5 years (4/18/2019). Social History/Living Environment:     living with niece right now, a few steps to enter; has own house also, steps to basement but can avoid that. Prior Level of Function/Work/Activity:  Independent with RW  Dominant Side:         RIGHT  Previous Treatment Approaches: [facility]   Personal Factors:          Sex:  female        Age:  [de-identified] y.o. Ambulatory/Rehab Services H2 Model Falls Risk Assessment   Risk Factors:       (1)  Dizziness/Vertigo       (5)  History of Recent Falls [w/in 3 months] Ability to Rise from Chair:       (0)  Ability to rise in a single movement   Falls Prevention Plan: Mobility Assistance Device (specify):  RW       Exercise/Equipment Adaptation (specify):  close supervision, rest breaks   Total: (5 or greater = High Risk): 6   ©2010 [facility] [address] [place] [address] States Patent #[id number].  Federal Law prohibits the replication, distribution or use without written permission from [facility] Current Medications:       Current Outpatient Medications:     DISABLED PLACARD (DISABLED PLACARD) DMV, Handicap placard, Disp: 1 Each, Rfl: 0    desvenlafaxine succinate (PRISTIQ) 25 mg ER tablet, TAKE 1 TABLET BY MOUTH EVERY MORNING FOR 1 WEEK, THEN 2 EVERY MORNING, Disp: , Rfl: 0    anastrozole (ARIMIDEX) 1 mg tablet, Take 1 mg by mouth daily. Indications: Hormone Receptor Positive Breast Cancer, Disp: 30 Tab, Rfl: 0    dronabinol (MARINOL) 5 mg capsule, , Disp: , Rfl:     carbidopa-levodopa (SINEMET)  mg per tablet, Take 1 Tab by mouth three (3) times daily. , Disp: 270 Tab, Rfl: 3    potassium chloride (KLOR-CON) 10 mEq tablet, Take 2 Tabs by mouth two (2) times a day. (Patient taking differently: Take 20 mEq by mouth daily.), Disp: 30 Tab, Rfl: 3    venlafaxine-SR (EFFEXOR-XR) 75 mg capsule, , Disp: , Rfl:     LORazepam (ATIVAN) 1 mg tablet, Take 1.5 mg by mouth three (3) times daily. , Disp: , Rfl:     lidocaine-prilocaine (EMLA) topical cream, Apply  to affected area as needed for Pain. Apply to port site 45-60 minutes before lab appointment, Disp: 30 g, Rfl: 2    venlafaxine-SR (EFFEXOR XR) 150 mg capsule, Take  by mouth daily. , Disp: , Rfl:     amLODIPine (NORVASC) 10 mg tablet, Take  by mouth daily. , Disp: , Rfl:     hydrALAZINE (APRESOLINE) 25 mg tablet, Take 25 mg by mouth four (4) times daily. , Disp: , Rfl:     losartan (COZAAR) 50 mg tablet, Take  by mouth daily. , Disp: , Rfl:     hydroCHLOROthiazide (HYDRODIURIL) 25 mg tablet, Take 25 mg by mouth daily. , Disp: , Rfl:     metoprolol tartrate (LOPRESSOR) 25 mg tablet, Take  by mouth two (2) times a day., Disp: , Rfl:     levothyroxine (SYNTHROID) 75 mcg tablet, Take  by mouth Daily (before breakfast). , Disp: , Rfl:     esomeprazole (NEXIUM) 40 mg capsule, Take  by mouth daily. , Disp: , Rfl:     oxybutynin chloride XL (DITROPAN XL) 10 mg CR tablet, Take 10 mg by mouth daily. , Disp: , Rfl:     melatonin 3 mg tablet, Take 6 mg by mouth nightly., Disp: , Rfl:     OLANZapine (ZYPREXA) 5 mg tablet, Take 5 mg by mouth two (2) times a day., Disp: , Rfl:    Date Last Reviewed:  12/10/19   Number of Personal Factors/Comorbidities that affect the Plan of Care: 1-2: MODERATE COMPLEXITY   EXAMINATION:   Observation/Orthostatic Postural Assessment:          WNL  Strength:          4-/5 B LE  Functional Mobility:         Gait/Ambulation:  Patient ambulates at a slow pace with decreased step length and heel strike, shuffling gait. Uses RW, but did not bring AD today since she walked in with her niece        Transfers:  independent        Bed Mobility:  independent        Stairs:  NT  Sensation:         intact  Postural Control & Balance:  · Pride Balance Scale:  42/56.   (A score less than 45/56 indicates high risk of falls)     · Dynamic Gait Index:  NT/24.   (A score less than or equal to19/24 is abnormal and predictive of falls)       Coordination:          WNL    Additional Special Tests:     5x sit to stand: 16.56 seconds   6 minute walk test: 600 feet  o Distance:  600 feet  o Assistive Device:  RW  o Orthosis/Brace:  none  o Amount of Assist:  SBA  o Gait Characteristics:  Shuffling/decreased step length          Body Structures Involved:  1. Nerves  2. Muscles Body Functions Affected:  1. Cardio  2. Neuromusculoskeletal  3. Movement Related Activities and Participation Affected:  1. General Tasks and Demands  2. Mobility  3. Domestic Life  4.  Community, Social and [place]   Number of elements (examined above) that affect the Plan of Care: 3: MODERATE COMPLEXITY   CLINICAL PRESENTATION:   Presentation: Evolving clinical presentation with changing clinical characteristics: MODERATE COMPLEXITY   CLINICAL DECISION MAKING:   Use of outcome tool(s) and clinical judgement create a POC that gives a: Questionable prediction of patient's progress: MODERATE COMPLEXITY

## 2019-12-12 ENCOUNTER — APPOINTMENT (OUTPATIENT)
Dept: PHYSICAL THERAPY | Age: 79
End: 2019-12-12
Attending: PSYCHIATRY & NEUROLOGY
Payer: MEDICARE

## 2019-12-13 ENCOUNTER — HOSPITAL ENCOUNTER (OUTPATIENT)
Dept: PHYSICAL THERAPY | Age: 79
Discharge: HOME | End: 2019-12-13
Attending: PSYCHIATRY & NEUROLOGY
Payer: MEDICARE

## 2019-12-13 PROCEDURE — 97112 NEUROMUSCULAR REEDUCATION: CPT

## 2019-12-13 PROCEDURE — 97110 THERAPEUTIC EXERCISES: CPT

## 2019-12-13 NOTE — PROGRESS NOTES
Latricia Mendez  : 1940  [place]  Secondary: Hernán Zazuetakatharine 13 at [place] [address],[address], [place] [address].  [phone]   [phone]         OUTPATIENT PHYSICAL THERAPY: Daily Treatment Note 2019  Visit Count:  10    ICD-10: Treatment Diagnosis: Other abnormalities of gait and mobility R26.89  Precautions/Allergies:   Betadine [povidone-iodine]; Pcn [penicillins]; and Pentasa [mesalamine]   TREATMENT PLAN:  Effective Dates: 2019 TO 2020 (90 days). Frequency/Duration: 2 times a week for 60- 90 Day(s)    Pre-treatment Symptoms/Complaints: Doing well. Fatigue level today 5/10. HR 78 bpm, O2 98%  Pain: Initial: Pain Intensity 1: 0 0/10 Post Session:  0/10   Medications Last Reviewed:  2019  Updated Objective Findings:  None Today  TREATMENT:     THERAPEUTIC EXERCISE: (18 minutes):  Exercises per grid below to improve mobility, strength and balance. Required minimal verbal cues to promote proper body alignment, promote proper body posture and promote proper body mechanics. Progressed resistance, repetitions and complexity of movement as indicated. Date:  19   Activity/Exercise Parameters   Step ups 6 inch step x 10 reps each leg    Standing :  hip flexion, hip abduction, hip extension    Sit to stand From chair table 2x10 reps   Nustep Level 3 x 5 minutes   Walking in hallway No AD, x 6 minutes                 NEUROMUSCULAR RE-EDUCATION: (25 minutes):  Exercise/activities per grid below to improve balance, coordination, kinesthetic sense, posture and proprioception. Required minimal verbal cues to promote static and dynamic balance in standing.       Activity   Date  19   Activity/Exercise   Sets/reps/equipment   Walking with head turns        Walking with head up & down        Step overs     Over 1/2 foam roll x 20 reps fwd    Step taps     6 inch step x 30 reps  Fwd and cross   Marching   In place x 20 reps, no UE assist   Sidestepping   I   Crossovers      [place]      Walking  backwards        Tandem walking      Weaving in/out of cones        Picking up cones        Sports cord        ONEOK ball      Figure 8s       Circles right/left      Walking with 360 degree turns      Spirals      Weight shifting:    Left & Right        Weight shifting: Forward & Backward         Static Standing Balance        Standing with feet apart     Blue foams eyes open and closed   Standing with feet together        Standing with feet semitandem   Blue foams eyes open and closed   Standing with feet tandem      Single leg stance      X1/X2 Viewing exercises        Hallpike-Mindy testing for BPPV (Benign Paroxysmal Positional Vertigo)        Cali-Daroff exercises      Canalith Repositioning treatment/Epley Maneuver  for BPPV (Benign Paroxysmal Positional Vertigo)      Smart Equitest Training: See scanned report. [facility]  Treatment/Session Summary:    · Response to Treatment:   Patient tolerated session well. She had good energy levels today. Continue to progress as tolerated. · Communication/Consultation:  None today  · Equipment provided today:  None today  · Recommendations/Intent for next treatment session: Next visit will focus on therapeutic exercise for strengthening and activity tolerance, balance training.     Total Treatment Billable Duration:  43 minutes  PT Patient Time In/Time Out  Time In: 1300  Time Out: Shaquille Moss PT    Future Appointments   Date Time Provider Milla Betancur   12/17/2019 10:15 AM LUIS Dc   12/19/2019 11:00 AM NUREphraim [facility]   12/20/2019 11:00 AM LUIS Dc   1/7/2020 11:30 AM Daja Drummond NP [facility]   1/10/2020  1:40 PM [facility] OUTREACH INSURANCE [facility]   1/10/2020  2:00 PM Ruthie Chavez MD [facility]   1/10/2020  3:00 PM [id number] [address] [place] [address]   3/18/2020  2:00 PM James Roman MD BSNE BSNE

## 2019-12-17 ENCOUNTER — HOSPITAL ENCOUNTER (OUTPATIENT)
Dept: PHYSICAL THERAPY | Age: 79
Discharge: HOME | End: 2019-12-17
Attending: PSYCHIATRY & NEUROLOGY
Payer: MEDICARE

## 2019-12-17 PROCEDURE — 97110 THERAPEUTIC EXERCISES: CPT

## 2019-12-17 PROCEDURE — 97112 NEUROMUSCULAR REEDUCATION: CPT

## 2019-12-17 NOTE — PROGRESS NOTES
Auther Kanaranzi  : 1940  [place]  Secondary: Hernán Melendrez 13 at [place] [id number] [facility], [address] [place] [address],[address], [address] [facility]  [phone]   [phone]         OUTPATIENT PHYSICAL THERAPY: Daily Treatment Note 2019  Visit Count:  11    ICD-10: Treatment Diagnosis: Other abnormalities of gait and mobility R26.89  Precautions/Allergies:   Betadine [povidone-iodine]; Pcn [penicillins]; and Pentasa [mesalamine]   TREATMENT PLAN:  Effective Dates: 2019 TO 2020 (90 days). Frequency/Duration: 2 times a week for 60- 90 Day(s)    Pre-treatment Symptoms/Complaints: No complaints. Fatigue level today 5/10. HR 80 bpm, O2 98%  Pain: Initial: Pain Intensity 1: 0 0/10 Post Session:  0/10   Medications Last Reviewed:  2019  Updated Objective Findings:  None Today  TREATMENT:     THERAPEUTIC EXERCISE: (20 minutes):  Exercises per grid below to improve mobility, strength and balance. Required minimal verbal cues to promote proper body alignment, promote proper body posture and promote proper body mechanics. Progressed resistance, repetitions and complexity of movement as indicated. Date:  19   Activity/Exercise Parameters   Step ups 6 inch step x 12 reps each leg    Standing :  hip flexion, hip abduction, hip extension X 15 reps each leg, each exercise   Sit to stand From mat table 2x10 reps   Nustep Level 3 x 5 minutes   Walking in hallway                  NEUROMUSCULAR RE-EDUCATION: (25 minutes):  Exercise/activities per grid below to improve balance, coordination, kinesthetic sense, posture and proprioception. Required minimal verbal cues to promote static and dynamic balance in standing.       Activity   Date  19   Activity/Exercise   Sets/reps/equipment   Walking with head turns        Walking with head up & down        Step overs     Over 1/2 foam roll x 20 reps fwd and laterally   Step taps     6 inch step x 30 reps  Fwd and cross   Marching   In place x 20 reps, no UE assist   Sidestepping      Crossovers      [place]      Walking  backwards        Tandem walking      Weaving in/out of cones        Picking up cones        Sports cord        [employer] ball      Figure 8s       Circles right/left      Walking with 360 degree turns      Spirals      Weight shifting:    Left & Right        Weight shifting: Forward & Backward         Static Standing Balance        Standing with feet apart     Blue foams eyes open and closed   Standing with feet together        Standing with feet semitandem   Blue foams eyes open and closed   Standing with feet tandem      Single leg stance      X1/X2 Viewing exercises        Hallpike-[place] testing for BPPV (Benign Paroxysmal Positional Vertigo)        Cali-Daroff exercises      Canalith Repositioning treatment/Epley Maneuver  for BPPV (Benign Paroxysmal Positional Vertigo)      Smart Equitest Training: See scanned report. [employer]  Treatment/Session Summary:    · Response to Treatment:   Patient tolerated session well. Patient's balance and activity tolerance have improved, and reports less overal fatigue in general.   Continue to progress as tolerated. · Communication/Consultation:  None today  · Equipment provided today:  None today  · Recommendations/Intent for next treatment session: Next visit will focus on therapeutic exercise for strengthening and activity tolerance, balance training.     Total Treatment Billable Duration:  45 minutes  PT Patient Time In/Time Out  Time In: 1010  Time Out: [place] Milind Adame    Future Appointments   Date Time Provider Milla Betancur   12/19/2019 11:00 AM NUR4 [facility]   12/20/2019 11:00 AM Logan Zimmerman PT [facility] SFE   1/7/2020 11:30 AM Juleen Pallas, NP [facility] BEHAVIORAL HEALTH SERVICES [facility]   1/10/2020  1:40 PM [facility] OUTREACH INSURANCE [facility]   1/10/2020  2:00 PM Jaun Tejeda MD [facility] [address] [facility]   1/10/2020  3:00 PM Landry Harris [facility] [address] [facility]   3/18/2020  2:00 PM Lelo Schaeffer MD BSNE BSNE

## 2019-12-19 ENCOUNTER — HOSPITAL ENCOUNTER (OUTPATIENT)
Dept: INFUSION THERAPY | Age: 79
Discharge: HOME | End: 2019-12-19
Payer: MEDICARE

## 2019-12-19 VITALS
RESPIRATION RATE: 18 BRPM | OXYGEN SATURATION: 96 % | SYSTOLIC BLOOD PRESSURE: 160 MMHG | DIASTOLIC BLOOD PRESSURE: 68 MMHG | HEART RATE: 85 BPM | TEMPERATURE: 97.4 F | BODY MASS INDEX: 27.86 KG/M2 | WEIGHT: 170 LBS

## 2019-12-19 DIAGNOSIS — C50.911 MALIGNANT NEOPLASM OF RIGHT BREAST IN FEMALE, ESTROGEN RECEPTOR POSITIVE, UNSPECIFIED SITE OF BREAST (HCC): Primary | ICD-10-CM

## 2019-12-19 DIAGNOSIS — Z17.0 MALIGNANT NEOPLASM OF RIGHT BREAST IN FEMALE, ESTROGEN RECEPTOR POSITIVE, UNSPECIFIED SITE OF BREAST (HCC): Primary | ICD-10-CM

## 2019-12-19 LAB
ALBUMIN SERPL-MCNC: 2.9 G/DL (ref 3.2–4.6)
ALBUMIN/GLOB SERPL: 0.7 {RATIO} (ref 1.2–3.5)
ALP SERPL-CCNC: 92 U/L (ref 50–136)
ALT SERPL-CCNC: 14 U/L (ref 12–65)
ANION GAP SERPL CALC-SCNC: 8 MMOL/L (ref 7–16)
AST SERPL-CCNC: 19 U/L (ref 15–37)
BASOPHILS # BLD: 0 K/UL (ref 0–0.2)
BASOPHILS NFR BLD: 0 % (ref 0–2)
BILIRUB SERPL-MCNC: 0.3 MG/DL (ref 0.2–1.1)
BUN SERPL-MCNC: 18 MG/DL (ref 8–23)
CALCIUM SERPL-MCNC: 9 MG/DL (ref 8.3–10.4)
CHLORIDE SERPL-SCNC: 106 MMOL/L (ref 98–107)
CO2 SERPL-SCNC: 26 MMOL/L (ref 21–32)
CREAT SERPL-MCNC: 1 MG/DL (ref 0.6–1)
DIFFERENTIAL METHOD BLD: ABNORMAL
EOSINOPHIL # BLD: 0.1 K/UL (ref 0–0.8)
EOSINOPHIL NFR BLD: 1 % (ref 0.5–7.8)
ERYTHROCYTE [DISTWIDTH] IN BLOOD BY AUTOMATED COUNT: 15.1 % (ref 11.9–14.6)
GLOBULIN SER CALC-MCNC: 4.3 G/DL (ref 2.3–3.5)
GLUCOSE SERPL-MCNC: 122 MG/DL (ref 65–100)
HCT VFR BLD AUTO: 36.1 % (ref 35.8–46.3)
HGB BLD-MCNC: 11.1 G/DL (ref 11.7–15.4)
IMM GRANULOCYTES # BLD AUTO: 0 K/UL (ref 0–0.5)
IMM GRANULOCYTES NFR BLD AUTO: 0 % (ref 0–5)
LYMPHOCYTES # BLD: 1.1 K/UL (ref 0.5–4.6)
LYMPHOCYTES NFR BLD: 15 % (ref 13–44)
MCH RBC QN AUTO: 28.5 PG (ref 26.1–32.9)
MCHC RBC AUTO-ENTMCNC: 30.7 G/DL (ref 31.4–35)
MCV RBC AUTO: 92.8 FL (ref 79.6–97.8)
MONOCYTES # BLD: 0.5 K/UL (ref 0.1–1.3)
MONOCYTES NFR BLD: 7 % (ref 4–12)
NEUTS SEG # BLD: 5.7 K/UL (ref 1.7–8.2)
NEUTS SEG NFR BLD: 76 % (ref 43–78)
NRBC # BLD: 0 K/UL (ref 0–0.2)
PLATELET # BLD AUTO: 177 K/UL (ref 150–450)
PMV BLD AUTO: 10.6 FL (ref 9.4–12.3)
POTASSIUM SERPL-SCNC: 3.4 MMOL/L (ref 3.5–5.1)
PROT SERPL-MCNC: 7.2 G/DL (ref 6.3–8.2)
RBC # BLD AUTO: 3.89 M/UL (ref 4.05–5.25)
SODIUM SERPL-SCNC: 140 MMOL/L (ref 136–145)
WBC # BLD AUTO: 7.4 K/UL (ref 4.3–11.1)

## 2019-12-19 PROCEDURE — 74011250636 HC RX REV CODE- 250/636: Performed by: INTERNAL MEDICINE

## 2019-12-19 PROCEDURE — 36593 DECLOT VASCULAR DEVICE: CPT

## 2019-12-19 PROCEDURE — 85025 COMPLETE CBC W/AUTO DIFF WBC: CPT

## 2019-12-19 PROCEDURE — 96375 TX/PRO/DX INJ NEW DRUG ADDON: CPT

## 2019-12-19 PROCEDURE — 80053 COMPREHEN METABOLIC PANEL: CPT

## 2019-12-19 PROCEDURE — 96413 CHEMO IV INFUSION 1 HR: CPT

## 2019-12-19 PROCEDURE — 74011000250 HC RX REV CODE- 250: Performed by: INTERNAL MEDICINE

## 2019-12-19 RX ORDER — SODIUM CHLORIDE 0.9 % (FLUSH) 0.9 %
10 SYRINGE (ML) INJECTION AS NEEDED
Status: DISCONTINUED | OUTPATIENT
Start: 2019-12-19 | End: 2019-12-20 | Stop reason: HOSPADM

## 2019-12-19 RX ORDER — ONDANSETRON 2 MG/ML
8 INJECTION INTRAMUSCULAR; INTRAVENOUS ONCE
Status: COMPLETED | OUTPATIENT
Start: 2019-12-19 | End: 2019-12-19

## 2019-12-19 RX ORDER — SODIUM CHLORIDE 9 MG/ML
25 INJECTION, SOLUTION INTRAVENOUS CONTINUOUS
Status: DISCONTINUED | OUTPATIENT
Start: 2019-12-19 | End: 2019-12-20 | Stop reason: HOSPADM

## 2019-12-19 RX ADMIN — ADO-TRASTUZUMAB EMTANSINE 260 MG: 20 INJECTION, POWDER, LYOPHILIZED, FOR SOLUTION INTRAVENOUS at 14:01

## 2019-12-19 RX ADMIN — ALTEPLASE 2 MG: 2.2 INJECTION, POWDER, LYOPHILIZED, FOR SOLUTION INTRAVENOUS at 11:40

## 2019-12-19 RX ADMIN — Medication 10 ML: at 14:45

## 2019-12-19 RX ADMIN — ONDANSETRON 8 MG: 2 INJECTION INTRAMUSCULAR; INTRAVENOUS at 13:36

## 2019-12-19 RX ADMIN — SODIUM CHLORIDE 25 ML/HR: 900 INJECTION, SOLUTION INTRAVENOUS at 13:30

## 2019-12-19 NOTE — PROGRESS NOTES
Arrived to the [facility] ambulatory. Cathflo, labs and chemo completed. Patient tolerated well. Any issues or concerns during appointment: no.  Patient aware of next infusion appointment on  1/10 at 1500. Discharged to home ambulatory.

## 2019-12-20 ENCOUNTER — HOSPITAL ENCOUNTER (OUTPATIENT)
Dept: PHYSICAL THERAPY | Age: 79
Discharge: HOME | End: 2019-12-20
Attending: PSYCHIATRY & NEUROLOGY
Payer: MEDICARE

## 2019-12-20 PROCEDURE — 97110 THERAPEUTIC EXERCISES: CPT

## 2019-12-20 PROCEDURE — 97112 NEUROMUSCULAR REEDUCATION: CPT

## 2019-12-20 NOTE — PROGRESS NOTES
Fernanda Garcia  : 1940  [place]  Secondary: Hernán Melendrez 13 at [place] [id number] [facility], [address] [place] [address],[address], [facility] [address].  [phone]   [phone]         OUTPATIENT PHYSICAL THERAPY: Daily Treatment Note 2019  Visit Count:  12    ICD-10: Treatment Diagnosis: Other abnormalities of gait and mobility R26.89  Precautions/Allergies:   Betadine [povidone-iodine]; Pcn [penicillins]; and Pentasa [mesalamine]   TREATMENT PLAN:  Effective Dates: 2019 TO 2020 (90 days). Frequency/Duration: 2 times a week for 60- 90 Day(s)    Pre-treatment Symptoms/Complaints: No complaints. Doing well today. Fatigue level today 6/10. HR 78 bpm, O2 98%  Pain: Initial: Pain Intensity 1: 0 0/10 Post Session:  0/10   Medications Last Reviewed:  2019  Updated Objective Findings:  None Today  TREATMENT:     THERAPEUTIC EXERCISE: (20 minutes):  Exercises per grid below to improve mobility, strength and balance. Required minimal verbal cues to promote proper body alignment, promote proper body posture and promote proper body mechanics. Progressed resistance, repetitions and complexity of movement as indicated. Date:  19   Activity/Exercise Parameters   Step ups 6 inch step x 15 reps each leg    Standing :  hip flexion, hip abduction, hip extension X 15 reps each leg, each exercise   Sit to stand From mat table x20 reps   Nustep Level 4 x 5 minutes         NEUROMUSCULAR RE-EDUCATION: (25 minutes):  Exercise/activities per grid below to improve balance, coordination, kinesthetic sense, posture and proprioception. Required minimal verbal cues to promote static and dynamic balance in standing.       Activity   Date  19   Activity/Exercise   Sets/reps/equipment   Walking with head turns        Walking with head up & down        Step overs     Over 1/2 foam roll x 20 reps fwd and laterally with no UE assist   Step taps     6 inch step x 30 reps  Fwd and cross with no UE assist   Walking    In long hallway and up/down ramps, VCs for longer steps, SBA   Sidestepping      Crossovers      [place]      Walking  backwards        Tandem walking      Weaving in/out of cones        Picking up cones        Sports cord        [place]      Kick ball      Figure 8s       Circles right/left      Walking with 360 degree turns      Spirals      Weight shifting:    Left & Right        Weight shifting: Forward & Backward         Static Standing Balance        Standing with feet apart     Blue foams eyes open and closed   Standing with feet together        Standing with feet semitandem   Blue foams eyes open and closed   Standing with feet tandem      Single leg stance      X1/X2 Viewing exercises        Hallpike-[place] testing for BPPV (Benign Paroxysmal Positional Vertigo)        Cali-Daroff exercises      Canalith Repositioning treatment/Epley Maneuver  for BPPV (Benign Paroxysmal Positional Vertigo)      Smart Equitest Training: See scanned report. [employer]  Treatment/Session Summary:    · Response to Treatment:   Patient tolerated session well. She is demonstrating improving strength, activity tolerance and balance. Continue to progress as tolerated. · Communication/Consultation:  None today  · Equipment provided today:  None today  · Recommendations/Intent for next treatment session: Next visit will focus on therapeutic exercise for strengthening and activity tolerance, balance training.     Total Treatment Billable Duration:  45 minutes  PT Patient Time In/Time Out  Time In: 1100  Time Out: [address], PT    Future Appointments   Date Time Provider Milla Betancur   1/2/2020 10:15 AM Laine Nash PT [facility] JERE   1/7/2020 11:30 AM Nitesh Knapp NP [facility] BEHAVIORAL HEALTH SERVICES [facility]   1/8/2020 10:15 AM Laine Nash PT [facility]   1/10/2020 11:00 AM Laine Nash PT [facility] 1/10/2020  1:40 PM [address] [facility] OUTREACH INSURANCE [facility] [address] [facility]   1/10/2020  2:00 PM Mariya Kinsey MD [facility]   1/10/2020  3:00 PM Harry Jechas [place] [address] [facility]   1/13/2020 10:15 AM Patria Marco, PT S[facility]PT SFE   1/15/2020 10:15 AM Patria Marco, PT [facility] SFE   1/20/2020 10:15 AM Patria Marco, PT [facility] SFE   1/22/2020 10:15 AM Patria Marco, PT [facility]PT SFE   1/27/2020 10:15 AM Patria Marco, PT [facility]PT SFE   1/29/2020 10:15 AM Patria Marco, PT SFEORPT SFE   3/18/2020  2:00 PM Susie Pisano MD [facility] BSNE

## 2020-01-02 ENCOUNTER — HOSPITAL ENCOUNTER (OUTPATIENT)
Dept: PHYSICAL THERAPY | Age: 80
Discharge: HOME | End: 2020-01-02
Attending: PSYCHIATRY & NEUROLOGY
Payer: MEDICARE

## 2020-01-02 PROCEDURE — 97112 NEUROMUSCULAR REEDUCATION: CPT

## 2020-01-02 PROCEDURE — 97110 THERAPEUTIC EXERCISES: CPT

## 2020-01-02 NOTE — PROGRESS NOTES
Chata Saba  : 1940  [place]  Secondary: Hernán Melendrez 13 at [place] [id number] [facility], [address] [place] [address],[address], [place] [address].  [phone]   [phone]         OUTPATIENT PHYSICAL THERAPY: Daily Treatment Note 2020  Visit Count:  13    ICD-10: Treatment Diagnosis: Other abnormalities of gait and mobility R26.89  Precautions/Allergies:   Betadine [povidone-iodine]; Pcn [penicillins]; and Pentasa [mesalamine]   TREATMENT PLAN:  Effective Dates: 2019 TO 2020 (90 days). Frequency/Duration: 2 times a week for 60- 90 Day(s)    Pre-treatment Symptoms/Complaints: No complaints. Tired today. To see orthopedic MD tomorrow to reassess R foot. No pain or problems. Fatigue level today 6/10. HR 83 bpm, O2 96%  Pain: Initial: Pain Intensity 1: 0 0/10 Post Session:  0/10   Medications Last Reviewed:  2020  Updated Objective Findings:  None Today  TREATMENT:     THERAPEUTIC EXERCISE: (15 minutes):  Exercises per grid below to improve mobility, strength and balance. Required minimal verbal cues to promote proper body alignment, promote proper body posture and promote proper body mechanics. Progressed resistance, repetitions and complexity of movement as indicated. Date:  20   Activity/Exercise Parameters   Step ups 6 inch step x 15 reps each leg    Standing :  hip flexion, hip abduction, hip extension X 15 reps each leg, each exercise   Sit to stand From mat table 2x10 reps   Nustep Level 4 x 5 minutes         NEUROMUSCULAR RE-EDUCATION: (25 minutes):  Exercise/activities per grid below to improve balance, coordination, kinesthetic sense, posture and proprioception. Required minimal verbal cues to promote static and dynamic balance in standing.       Activity   Date  20   Activity/Exercise   Sets/reps/equipment   Walking with head turns        Walking with head up & down        Step overs     Over 1/2 foam roll x 20 reps fwd and laterally with no UE assist   Step taps     6 inch step x 30 reps  Fwd and cross with no UE assist   Walking    In long hallway and up/down ramps, VCs for longer steps, SBA   Sidestepping      Crossovers      [place]      Walking  backwards        Tandem walking      Weaving in/out of cones        Picking up cones        Sports cord        [employer]      Kick ball      Figure 8s       Circles right/left      Walking with 360 degree turns      Spirals      Weight shifting:    Left & Right        Weight shifting: Forward & Backward         Static Standing Balance        Standing with feet apart     Blue foams eyes open and closed   Standing with feet together        Standing with feet semitandem   Blue foams eyes open and closed   Standing with feet tandem      Single leg stance      X1/X2 Viewing exercises        Hallpike-[place] testing for BPPV (Benign Paroxysmal Positional Vertigo)        Cali-Daroff exercises      Canalith Repositioning treatment/Epley Maneuver  for BPPV (Benign Paroxysmal Positional Vertigo)      Smart Equitest Training: See scanned report. [employer]  Treatment/Session Summary:    · Response to Treatment:   Patient tolerated session well. Balance and activity tolerance have improved. Patient still needs frequent seated rest breaks due to elevated fatigue level, but patient works through all exercises well. Use no longer uses an assistive device for mobility. Continue to progress as tolerated. · Communication/Consultation:  None today  · Equipment provided today:  None today  · Recommendations/Intent for next treatment session: Next visit will focus on therapeutic exercise for strengthening and activity tolerance, balance training.     Total Treatment Billable Duration:  40 minutes  PT Patient Time In/Time Out  Time In: 1010  Time Out: [address] [facility]    Future Appointments   Date Time Provider Milla Betancur   1/7/2020 11:30 AM Kelvin Guerrero C, NP [facility]   1/8/2020 10:15 AM Winferd Burn, PT S[facility]PT SFE   1/10/2020 11:00 AM Winferd Burn, PT SFEORPT SFE   1/10/2020  1:40 PM [facility] OUTREACH INSURANCE [facility] [address] [facility]   1/10/2020  2:00 PM Emelyn Reyes MD [facility]   1/10/2020  3:00 PM Joo Jean [facility]   1/13/2020 10:15 AM Winferd Burn, PT SFEORPT SFE   1/15/2020 10:15 AM Winferd Burn, PT [facility]PT S[facility]   1/20/2020 10:15 AM Winferd Burn, PT [facility]PT S[facility]   1/22/2020 10:15 AM Winferd Burn, PT [facility]PT SFE   1/27/2020 10:15 AM Winferd Burn, PT S[facility]PT SFE   1/29/2020 10:15 AM Winferd Burn, PT SFEORPT SFE   3/18/2020  2:00 PM Magi Shaikh MD [facility] BSNE

## 2020-01-07 ENCOUNTER — HOSPITAL ENCOUNTER (OUTPATIENT)
Dept: RADIATION ONCOLOGY | Age: 80
Discharge: HOME | End: 2020-01-07
Payer: MEDICARE

## 2020-01-07 VITALS
RESPIRATION RATE: 16 BRPM | HEART RATE: 94 BPM | TEMPERATURE: 97.8 F | SYSTOLIC BLOOD PRESSURE: 139 MMHG | DIASTOLIC BLOOD PRESSURE: 57 MMHG | OXYGEN SATURATION: 94 % | BODY MASS INDEX: 27.78 KG/M2 | WEIGHT: 169.5 LBS

## 2020-01-07 PROCEDURE — 99211 OFF/OP EST MAY X REQ PHY/QHP: CPT

## 2020-01-07 NOTE — PROGRESS NOTES
Pt was given a prescription (with the [facility] On[facility] fax number) by the NP for a mammogram to be done at [facility] in 4/2020.

## 2020-01-07 NOTE — PROGRESS NOTES
Patient: Quita Aggarwal MRN: [id number]  SSN: [id number]    YOB: 1940  Age: [de-identified] y.o. Sex: female      Other Providers: Josef Cuevas MD    CHIEF COMPLAINT: Breast cancer    DIAGNOSIS: invasive ductal carcinoma of the right breast, intermediate grade, %, AR 10%, HER-2 equivocal (2+), but HER-2 positive by FISH, clinical T1c N0, now s/p lumpectomy and SLN biopsy revealing 1.2 cm residual IDC, 0/6 SLN, [id number](sn)    PREVIOUS TREATMENT:    1) neoadjuvant TCH  2) lumpectomy 9/11/2019  3) radiation right breast. Dose: Right breast: 4256 cGy in 16 fractions, Right breast boost: 1000 cGy in 4 fractions. Treatment dates: 10/3/2019 - 10/30/2019.      HISTORY OF PRESENT ILLNESS:  Quita Aggarwal is a [de-identified] y.o. female seen initially by Dr. Tiffanie Shahid at the request of Dr. Song Parker. She has a medical history significant for anemia, anxiety, Crohn's disease, GERD, depression, melanoma, hypertension, hypothyroidism, hyperlipidemia. She was found to have an abnormality in the right breast on routine screening mammogram. She has a remote history of breast biopsy with benign finding. Right breast diagnostic mammogram and ultrasound on 03/18/19 revealed spiculated 1.5 cm mass in the upper inner quadrant of the right breast.  Ultrasound revealed an irregular, hypoechoic, approximately 1.5 cm mass at the 2 o'clock position in the right breast 3 cm from the nipple corresponding to mammographic abnormality. This was felt to be highly suggestive of malignancy, BI-RADS 5. Biopsy on 03/29/19 revealed invasive ductal carcinoma, grade 2, %, AR 10%, HER-2 equivocal (2+), but HER-2 positive by FISH. She discussed management options with Dr. Song Parker and was recommended to undergo chemotherapy given her HER-2 positive status. He recommended neoadjuvant TCH chemotherapy.   MRI of the breasts on 04/24/19 showed a 1.8 x 1.6 x 2 cm spiculated appearing enhancing mass underlying the right breast skin marker consistent with the patient's known IDC. There were no other areas of abnormality in either breast.  There was no evidence of lymphadenopathy. Dr. Joanne Linedr met with her on 04/25/19 to discuss the potential role of radiation therapy in management of this stage I HER-2 positive right breast cancer. We discussed that frequently radiation therapy can be omitted from [facility] in women over the age of 79. However, because Ms. Jesse Smart has a HER2 positive breast cancer required chemotherapy I felt somewhat hesitant to omit radiation therapy. We discussed that if she had a pathologic CR to chemotherapy, then omission of radiation therapy would be reasonable. However, if she had considerable residual disease or if she was unable to tolerate chemotherapy, then I would recommend proceeding with adjuvant radiation therapy. US after cycle 3 showed significant LA. She completed 6 cycles of TCH chemotherapy. She was then recommended to complete a year of Herceptin. MRI of the breasts on 08/27/19 showed decrease in size of the right medial 2:00 breast cancer now measuring 1.0 x 0.8 x 1.4 cm compared to 1.8 x 1.6 x 2.0 cm previously. There was no evidence of lymphadenopathy and no new suspicious findings. She received an infusion of Herceptin on 09/06/19 and is to receive treatment q 3 weeks. On 09/11/19 she underwent lumpectomy and sentinel lymph node biopsy. Pathology revealed invasive ductal carcinoma, 12 mm, grade 2. Margins were negative. 0/6 sentinel lymph nodes were involved. Staging was [id number](sn). TREATMENT COURSE:  Chana Edge did well without complaints. She continued to tolerate radiation therapy without difficulty on week 3. She had a fall at home while going to the bathroom and hit her tailbone and head on the floor. She was a bit sore, but without significant injuries. Week 4 doing well. No complaints.         INTERVAL HISTORY:    01/07/20: Ms Jesse Smart returns 10 weeks out from completing radiation therapy.  She tolerated radiation extremely well. At this time, Ms Sean Olivier is doing well. Reports no breast issues. Now on Arimidex. Good appetite. Energy level at her baseline. No complaints. OBSTETRIC HISTORY:    Menarche at the age of 11. G2,P0. Oral Contraceptive Pills:  None  Hormone Replacement Therapy:  None    PAST MEDICAL HISTORY:    Past Medical History:   Diagnosis Date    Anxiety     Cancer [facility])     melanoma    Cancer of right breast ([facility] [address] Dx 04/2019    Crohn's disease ([facility] [address]     Hypertension     Psychiatric disorder     anxiety/depression    Thyroid disease        The patient denies history of collagen vascular diseases, pacemaker insertion, prior radiation or prior chemotherapy. PAST SURGICAL HISTORY:   Past Surgical History:   Procedure Laterality Date    HX HYSTERECTOMY      HX LUMBAR LAMINECTOMY      IR INSERT TUNL CVC W PORT OVER 5 YEARS  4/18/2019       MEDICATIONS:     Current Outpatient Medications:     anastrozole (ARIMIDEX) 1 mg tablet, Take 1 mg by mouth daily. Indications: Hormone Receptor Positive Breast Cancer, Disp: 30 Tab, Rfl: 0    anastrozole (ARIMIDEX) 1 mg tablet, Take 1 mg by mouth daily. , Disp: 90 Tab, Rfl: 3    DISABLED PLACARD (DISABLED PLACARD) DMV, Handicap placard, Disp: 1 Each, Rfl: 0    desvenlafaxine succinate (PRISTIQ) 25 mg ER tablet, TAKE 1 TABLET BY MOUTH EVERY MORNING FOR 1 WEEK, THEN 2 EVERY MORNING, Disp: , Rfl: 0    dronabinol (MARINOL) 5 mg capsule, , Disp: , Rfl:     carbidopa-levodopa (SINEMET)  mg per tablet, Take 1 Tab by mouth three (3) times daily. , Disp: 270 Tab, Rfl: 3    potassium chloride (KLOR-CON) 10 mEq tablet, Take 2 Tabs by mouth two (2) times a day. (Patient taking differently: Take 20 mEq by mouth daily.), Disp: 30 Tab, Rfl: 3    venlafaxine-SR (EFFEXOR-XR) 75 mg capsule, , Disp: , Rfl:     LORazepam (ATIVAN) 1 mg tablet, Take 1.5 mg by mouth three (3) times daily. , Disp: , Rfl:     lidocaine-prilocaine (EMLA) topical cream, Apply  to affected area as needed for Pain. Apply to port site 45-60 minutes before lab appointment, Disp: 30 g, Rfl: 2    venlafaxine-SR (EFFEXOR XR) 150 mg capsule, Take  by mouth daily. , Disp: , Rfl:     amLODIPine (NORVASC) 10 mg tablet, Take  by mouth daily. , Disp: , Rfl:     hydrALAZINE (APRESOLINE) 25 mg tablet, Take 25 mg by mouth four (4) times daily. , Disp: , Rfl:     losartan (COZAAR) 50 mg tablet, Take  by mouth daily. , Disp: , Rfl:     hydroCHLOROthiazide (HYDRODIURIL) 25 mg tablet, Take 25 mg by mouth daily. , Disp: , Rfl:     metoprolol tartrate (LOPRESSOR) 25 mg tablet, Take  by mouth two (2) times a day., Disp: , Rfl:     levothyroxine (SYNTHROID) 75 mcg tablet, Take  by mouth Daily (before breakfast). , Disp: , Rfl:     esomeprazole (NEXIUM) 40 mg capsule, Take  by mouth daily. , Disp: , Rfl:     oxybutynin chloride XL (DITROPAN XL) 10 mg CR tablet, Take 10 mg by mouth daily. , Disp: , Rfl:     melatonin 3 mg tablet, Take 6 mg by mouth nightly., Disp: , Rfl:     OLANZapine (ZYPREXA) 5 mg tablet, Take 5 mg by mouth two (2) times a day., Disp: , Rfl:     ALLERGIES:   Allergies   Allergen Reactions    Betadine [Povidone-Iodine] Hives    Pcn [Penicillins] Rash    Pentasa [Mesalamine] Other (comments)     Fever/chills       SOCIAL HISTORY:   Social History     Socioeconomic History    Marital status:      Spouse name: Not on file    Number of children: Not on file    Years of education: Not on file    Highest education level: Not on file   Occupational History    Not on file   Social Needs    Financial resource strain: Not on file    Food insecurity:     Worry: Not on file     Inability: Not on file    Transportation needs:     Medical: Not on file     Non-medical: Not on file   Tobacco Use    Smoking status: Never Smoker    Smokeless tobacco: Never Used   Substance and Sexual Activity    Alcohol use: Never     Frequency: Never    Drug use: Never    Sexual activity: Never   Lifestyle    Physical activity:     Days per week: Not on file     Minutes per session: Not on file    Stress: Not on file   Relationships    Social connections:     Talks on phone: Not on file     Gets together: Not on file     Attends Hoahaoism service: Not on file     Active member of club or organization: Not on file     Attends meetings of clubs or organizations: Not on file     Relationship status: Not on file    Intimate partner violence:     Fear of current or ex partner: Not on file     Emotionally abused: Not on file     Physically abused: Not on file     Forced sexual activity: Not on file   Other Topics Concern     Service Not Asked    Blood Transfusions Not Asked    Caffeine Concern Not Asked    Occupational Exposure Not Asked   Karlis Martin Hazards Not Asked    Sleep Concern Not Asked    Stress Concern Not Asked    Weight Concern Not Asked    Special Diet Not Asked    Back Care Not Asked    Exercise Not Asked    Bike Helmet Not Asked   [address] Not Asked    Self-Exams Not Asked   Social History Narrative    Not on file       FAMILY HISTORY:   Family History   Problem Relation Age of Onset    Cancer Sister         melanoma    Cancer Brother         lung       REVIEW OF SYSTEMS: Please see interval history      PHYSICAL EXAMINATION:   ECOG Performance status 0  VITAL SIGNS: Blood pressure 139/57  Pulse 94  Temperature 97.8  Weight 169.5     GENERAL: The patient is well-developed, ambulatory, alert and in no acute distress. LYMPHATIC: There is no cervical, supraclavicular or axillary lymphadenopathy. BREASTS: Examination of the  right breast reveals a well-healing lumpectomy incision at the nipple. There is no palpable mass. Axillary incision well healed.        PATHOLOGY:    03/29/19:     LABORATORY:   Lab Results   Component Value Date/Time    Sodium 140 12/19/2019 12:41 PM    Potassium 3.4 (L) 12/19/2019 12:41 PM    Chloride 106 12/19/2019 12:41 PM    CO2 26 12/19/2019 12:41 PM    Anion gap 8 12/19/2019 12:41 PM    Glucose 122 (H) 12/19/2019 12:41 PM    BUN 18 12/19/2019 12:41 PM    Creatinine 1.00 12/19/2019 12:41 PM    GFR est AA >60 12/19/2019 12:41 PM    GFR est non-AA 57 (L) 12/19/2019 12:41 PM    Calcium 9.0 12/19/2019 12:41 PM    Magnesium 2.0 11/29/2019 01:38 PM    Albumin 2.9 (L) 12/19/2019 12:41 PM    Protein, total 7.2 12/19/2019 12:41 PM    Globulin 4.3 (H) 12/19/2019 12:41 PM    A-G Ratio 0.7 (L) 12/19/2019 12:41 PM    AST (SGOT) 19 12/19/2019 12:41 PM    ALT (SGPT) 14 12/19/2019 12:41 PM     Lab Results   Component Value Date/Time    WBC 7.4 12/19/2019 12:41 PM    HGB 11.1 (L) 12/19/2019 12:41 PM    HCT 36.1 12/19/2019 12:41 PM    PLATELET 405 50/33/9506 12:41 PM       RADIOLOGY:       08/27/19: MRI of the Breasts with and without contrast     CLINICAL INDICATION:  Restaging of right medial 2:00 breast cancer after  chemotherapy, no new problems, remote right benign surgical biopsy, personal  history of melanoma, right breast biopsy on 3/29/2019 at an outside facility in  [facility] demonstrating invasive ductal carcinoma at 2:00     COMPARISON: MRI 4/24/2019; mammography and ultrasound 3/29/2019, 3/18/2019     TECHNIQUE: Standard MRI sequences were obtained through the breasts in multiple  planes. Images were obtained before and after intravenous infusion of 17 mL of  Dotarem contrast. Images were reviewed with PACS and with [employer] CAD software.     FINDINGS: The breasts demonstrate moderate background glandularity and minimal  enhancement.     Right biopsy clip artifact is again seen at 2:00 middle depth within an  irregular heterogeneously enhancing mass measuring up to 1.0 x 0.8 x 1.4 cm  (previously 1.8 x 1.6 x 2.0 cm).  There is preservation of normal fat between the  mass and the overlying skin.     There is no other evidence of suspicious enhancing mass or nonmass enhancement  to suggest additional malignancy in either breast.     There is no evidence of axillary or internal mammary lymphadenopathy. Chest wall  signal is normal. Elsewhere, limited visualization of the partially included  thorax and upper abdomen shows no concerning findings. Portacatheter is  partially seen on the left.     IMPRESSION:   1. Decreased size of right medial 2:00 breast cancer after chemotherapy. 2. No new or suspicious finding otherwise.     Recommend continued management as clinically directed. Annual bilateral  mammogram will be due in February 2020.     BI-RADS Assessment Category 6: Known Biopsy Proven Malignancy        04/24/2019: Mri Breast Bi W Wo Cont  MRI BILATERAL BREASTS WITHOUT AND WITH CONTRAST, 4/24/2019. CLINICAL HISTORY:  New diagnosis of right breast IDC. Technique: Multiplanar multisequence imaging of the breast were performed prior to and following the uneventful intravenous administration of 18 mL of Dotarem. Postcontrast imaging was performed using a dynamic technique. Images were reviewed using the [employer]. Sequences obtained: Sagittal T2, axial STIR, axial T1, and axial fat-saturated T1-weighted images prior to and following administration of contrast. Comparison studies: Bilateral mammograms 2/13/2019. FINDINGS: A skin marker has been placed at the 3:00 position of the right breast located 2.5 cm from the nipple marking the patient's biopsy site. The breasts are composed of heterogeneous fibroglandular elements. No clearly enlarged or dysmorphic appearing right axillary lymph node is seen. Left axillary lymph nodes appear nonspecific as well. . No enlarged internal mammary lymph nodes are seen. Evaluation of the lungs is limited by  MRI imaging. However, no obvious pulmonary masses are seen. No significant pleural effusions are seen. The liver is obscured by cardiac motion artifact and only partially visualized. However, no focal signal abnormalities are seen prior to, or following administration of contrast to suggest a possible hepatic lesion. Following the administration of intravenous contrast, normal enhancement is seen of the heart and vascular structures of the breasts. Minimal background physiologic enhancement is seen of the breasts. Underlying the right breast skin marker, there is a 1.8 cm AP by 1.6 cm wide by 2 cm craniocaudal spiculated appearing enhancing mass demonstrating a central biopsy defect consistent with the patient's known right breast IDC. No additional enhancing masses are seen to suggest potential multifocal or multicentric disease. No evidence of skin thickening, or nipple retraction is seen. No enhancing osseous lesion is seen. IMPRESSION: 1.  1.8 cm x 1.6 cm x 2 cm spiculated appearing enhancing mass underlying the right breast skin marker as described above consistent with the patient's known IDC. No additional concerning enhancement is seen to suggest potential multifocal or multicentric disease. 2. No evidence for metastatic disease in the visualized chest. BI-RADS Assessment Category 6: Known Biopsy Proven Malignancy       IMPRESSION:  Js Vasquez is a [de-identified] y.o. female with invasive ductal carcinoma of the right breast, intermediate grade, %, SD 10%, HER-2 equivocal (2+), but HER-2 positive by FISH, clinical T1c N0, now s/p lumpectomy and SLN biopsy revealing 1.2 cm residual IDC, 0/6 SLN, [id number](sn). She was treated with 4256 cGy to the whole breast in 16 fractions of 266 cGy each followed by a boost to the lumpectomy cavity of 1000 cGy in 4 fractions 10/3/2019 - 10/30/2019. PLAN:  1) Schedule mammogram for April - script provided to be done at [facility] per patient request  2) We discussed side effects related to radiation and ongoing surveillance for her breast cancer s/p radiation therapy to include follow up every 6 months 1-2 years. All questions were answered to the best of my ability. I will see her shortly after the mammogram  3) Continue to lotion.  Continue SBE  4) I spent 25 minutes in the care of Ms Rick Dowd today, over 50% of which was in direct counseling and ongoing management of her ICD of right breast.  All questions were answered to the best of my ability. I appreciate the opportunity to participate in Ms. Edge's care. Boogie Moore NP   January 7, 2020      Portions of this note were copied from prior encounters and reviewed for accuracy, currency, and represent documentation and tasks completed during this encounter. I verify and attest these portions to be unchanged from prior visits.

## 2020-01-07 NOTE — PROGRESS NOTES
01/10/2020 - Joshua Ramos to physical therapy    RT End: 10/30/2019    09/11/2019 - right lumpectomy  S/P 6 cycles TCH / Herceptin every 3 weeks (Neoadjuvant)  MRI Breast (08/27/2019): mass decreased in size    Carmina Rothman CMA

## 2020-01-08 ENCOUNTER — HOSPITAL ENCOUNTER (OUTPATIENT)
Dept: PHYSICAL THERAPY | Age: 80
Discharge: HOME | End: 2020-01-08
Attending: PSYCHIATRY & NEUROLOGY
Payer: MEDICARE

## 2020-01-08 PROCEDURE — 97112 NEUROMUSCULAR REEDUCATION: CPT

## 2020-01-08 PROCEDURE — 97110 THERAPEUTIC EXERCISES: CPT

## 2020-01-10 ENCOUNTER — HOSPITAL ENCOUNTER (OUTPATIENT)
Dept: LAB | Age: 80
Discharge: HOME | End: 2020-01-10
Payer: MEDICARE

## 2020-01-10 ENCOUNTER — HOSPITAL ENCOUNTER (OUTPATIENT)
Dept: PHYSICAL THERAPY | Age: 80
Discharge: HOME | End: 2020-01-10
Attending: PSYCHIATRY & NEUROLOGY
Payer: MEDICARE

## 2020-01-10 ENCOUNTER — PATIENT OUTREACH (OUTPATIENT)
Dept: CASE MANAGEMENT | Age: 80
End: 2020-01-10

## 2020-01-10 ENCOUNTER — HOSPITAL ENCOUNTER (OUTPATIENT)
Dept: INFUSION THERAPY | Age: 80
Discharge: HOME | End: 2020-01-10
Payer: MEDICARE

## 2020-01-10 VITALS — BODY MASS INDEX: 27.7 KG/M2 | HEIGHT: 66 IN

## 2020-01-10 DIAGNOSIS — C50.911 MALIGNANT NEOPLASM OF RIGHT BREAST IN FEMALE, ESTROGEN RECEPTOR POSITIVE, UNSPECIFIED SITE OF BREAST (HCC): Primary | ICD-10-CM

## 2020-01-10 DIAGNOSIS — Z17.0 MALIGNANT NEOPLASM OF RIGHT BREAST IN FEMALE, ESTROGEN RECEPTOR POSITIVE, UNSPECIFIED SITE OF BREAST (HCC): Primary | ICD-10-CM

## 2020-01-10 DIAGNOSIS — C50.911 MALIGNANT NEOPLASM OF RIGHT BREAST IN FEMALE, ESTROGEN RECEPTOR POSITIVE, UNSPECIFIED SITE OF BREAST (HCC): ICD-10-CM

## 2020-01-10 DIAGNOSIS — Z17.0 MALIGNANT NEOPLASM OF RIGHT BREAST IN FEMALE, ESTROGEN RECEPTOR POSITIVE, UNSPECIFIED SITE OF BREAST (HCC): ICD-10-CM

## 2020-01-10 PROBLEM — M85.88 OSTEOPENIA OF SPINE: Status: ACTIVE | Noted: 2020-01-10

## 2020-01-10 LAB
ALBUMIN SERPL-MCNC: 3.3 G/DL (ref 3.2–4.6)
ALBUMIN/GLOB SERPL: 0.7 {RATIO} (ref 1.2–3.5)
ALP SERPL-CCNC: 97 U/L (ref 50–136)
ALT SERPL-CCNC: 12 U/L (ref 12–65)
ANION GAP SERPL CALC-SCNC: 4 MMOL/L (ref 7–16)
AST SERPL-CCNC: 22 U/L (ref 15–37)
BASOPHILS # BLD: 0 K/UL (ref 0–0.2)
BASOPHILS NFR BLD: 1 % (ref 0–2)
BILIRUB SERPL-MCNC: 0.3 MG/DL (ref 0.2–1.1)
BUN SERPL-MCNC: 12 MG/DL (ref 8–23)
CALCIUM SERPL-MCNC: 9.1 MG/DL (ref 8.3–10.4)
CHLORIDE SERPL-SCNC: 107 MMOL/L (ref 98–107)
CO2 SERPL-SCNC: 29 MMOL/L (ref 21–32)
CREAT SERPL-MCNC: 1.03 MG/DL (ref 0.6–1)
DIFFERENTIAL METHOD BLD: ABNORMAL
EOSINOPHIL # BLD: 0 K/UL (ref 0–0.8)
EOSINOPHIL NFR BLD: 1 % (ref 0.5–7.8)
ERYTHROCYTE [DISTWIDTH] IN BLOOD BY AUTOMATED COUNT: 15.4 % (ref 11.9–14.6)
GLOBULIN SER CALC-MCNC: 4.5 G/DL (ref 2.3–3.5)
GLUCOSE SERPL-MCNC: 106 MG/DL (ref 65–100)
HCT VFR BLD AUTO: 36.5 % (ref 35.8–46.3)
HGB BLD-MCNC: 11.2 G/DL (ref 11.7–15.4)
IMM GRANULOCYTES # BLD AUTO: 0 K/UL (ref 0–0.5)
IMM GRANULOCYTES NFR BLD AUTO: 0 % (ref 0–5)
LYMPHOCYTES # BLD: 1.4 K/UL (ref 0.5–4.6)
LYMPHOCYTES NFR BLD: 22 % (ref 13–44)
MAGNESIUM SERPL-MCNC: 2 MG/DL (ref 1.8–2.4)
MCH RBC QN AUTO: 27.3 PG (ref 26.1–32.9)
MCHC RBC AUTO-ENTMCNC: 30.7 G/DL (ref 31.4–35)
MCV RBC AUTO: 88.8 FL (ref 79.6–97.8)
MONOCYTES # BLD: 0.6 K/UL (ref 0.1–1.3)
MONOCYTES NFR BLD: 10 % (ref 4–12)
NEUTS SEG # BLD: 4.2 K/UL (ref 1.7–8.2)
NEUTS SEG NFR BLD: 67 % (ref 43–78)
NRBC # BLD: 0 K/UL (ref 0–0.2)
PLATELET # BLD AUTO: 146 K/UL (ref 150–450)
PMV BLD AUTO: 9.9 FL (ref 9.4–12.3)
POTASSIUM SERPL-SCNC: 3.7 MMOL/L (ref 3.5–5.1)
PROT SERPL-MCNC: 7.8 G/DL (ref 6.3–8.2)
RBC # BLD AUTO: 4.11 M/UL (ref 4.05–5.25)
SODIUM SERPL-SCNC: 140 MMOL/L (ref 136–145)
WBC # BLD AUTO: 6.3 K/UL (ref 4.3–11.1)

## 2020-01-10 PROCEDURE — 96413 CHEMO IV INFUSION 1 HR: CPT

## 2020-01-10 PROCEDURE — 97112 NEUROMUSCULAR REEDUCATION: CPT

## 2020-01-10 PROCEDURE — 74011250636 HC RX REV CODE- 250/636: Performed by: INTERNAL MEDICINE

## 2020-01-10 PROCEDURE — 80053 COMPREHEN METABOLIC PANEL: CPT

## 2020-01-10 PROCEDURE — 74011000250 HC RX REV CODE- 250: Performed by: INTERNAL MEDICINE

## 2020-01-10 PROCEDURE — 97110 THERAPEUTIC EXERCISES: CPT

## 2020-01-10 PROCEDURE — 96375 TX/PRO/DX INJ NEW DRUG ADDON: CPT

## 2020-01-10 PROCEDURE — 83735 ASSAY OF MAGNESIUM: CPT

## 2020-01-10 PROCEDURE — 85025 COMPLETE CBC W/AUTO DIFF WBC: CPT

## 2020-01-10 RX ORDER — ONDANSETRON 2 MG/ML
8 INJECTION INTRAMUSCULAR; INTRAVENOUS ONCE
Status: COMPLETED | OUTPATIENT
Start: 2020-01-10 | End: 2020-01-10

## 2020-01-10 RX ORDER — SODIUM CHLORIDE 9 MG/ML
10 INJECTION INTRAMUSCULAR; INTRAVENOUS; SUBCUTANEOUS AS NEEDED
Status: DISCONTINUED | OUTPATIENT
Start: 2020-01-10 | End: 2020-01-11 | Stop reason: HOSPADM

## 2020-01-10 RX ORDER — SODIUM CHLORIDE 9 MG/ML
25 INJECTION, SOLUTION INTRAVENOUS CONTINUOUS
Status: DISCONTINUED | OUTPATIENT
Start: 2020-01-10 | End: 2020-01-11 | Stop reason: HOSPADM

## 2020-01-10 RX ADMIN — ADO-TRASTUZUMAB EMTANSINE 260 MG: 20 INJECTION, POWDER, LYOPHILIZED, FOR SOLUTION INTRAVENOUS at 16:00

## 2020-01-10 RX ADMIN — SODIUM CHLORIDE 25 ML/HR: 900 INJECTION, SOLUTION INTRAVENOUS at 15:30

## 2020-01-10 RX ADMIN — SODIUM CHLORIDE 10 ML: 9 INJECTION, SOLUTION INTRAMUSCULAR; INTRAVENOUS; SUBCUTANEOUS at 15:10

## 2020-01-10 RX ADMIN — SODIUM CHLORIDE 10 ML: 9 INJECTION, SOLUTION INTRAMUSCULAR; INTRAVENOUS; SUBCUTANEOUS at 16:45

## 2020-01-10 RX ADMIN — ONDANSETRON 8 MG: 2 INJECTION INTRAMUSCULAR; INTRAVENOUS at 15:29

## 2020-01-10 NOTE — PROGRESS NOTES
Arrived to infusion after [facility] aappt  No concerns  kadcyla infused  Tolerated well.  Cleared to start Prolia at next visit  Next appt 1/31/20

## 2020-01-10 NOTE — PROGRESS NOTES
1/10/19 saw pt today with Dr. Bedford Favre for pre chemo cycle 5 kadcyla. She is tolerating treatment well. PO intake is good. Repeat echo prior to next infusion in 3 weeks. Pt received dental clearance to start prolia. Will arrange at next infusion. Encouraged to call with any concerns. Navigation will continue to follow.

## 2020-01-13 ENCOUNTER — HOSPITAL ENCOUNTER (OUTPATIENT)
Dept: PHYSICAL THERAPY | Age: 80
Discharge: HOME | End: 2020-01-13
Attending: PSYCHIATRY & NEUROLOGY
Payer: MEDICARE

## 2020-01-13 PROCEDURE — 97110 THERAPEUTIC EXERCISES: CPT

## 2020-01-13 PROCEDURE — 97112 NEUROMUSCULAR REEDUCATION: CPT

## 2020-01-15 ENCOUNTER — HOSPITAL ENCOUNTER (OUTPATIENT)
Dept: PHYSICAL THERAPY | Age: 80
Discharge: HOME | End: 2020-01-15
Attending: PSYCHIATRY & NEUROLOGY
Payer: MEDICARE

## 2020-01-15 PROCEDURE — 97110 THERAPEUTIC EXERCISES: CPT

## 2020-01-15 PROCEDURE — 97112 NEUROMUSCULAR REEDUCATION: CPT

## 2020-01-15 NOTE — PROGRESS NOTES
Oval Mirna  : 1940  [place]  Secondary: Hernán Melendrez [address] at [place] [id number] [facility], [address] [place] [address] [address] [facility]  [phone]   [phone]         OUTPATIENT PHYSICAL THERAPY: Daily Treatment Note 1/15/2020  Visit Count:  17    ICD-10: Treatment Diagnosis: Other abnormalities of gait and mobility R26.89  Precautions/Allergies:   Betadine [povidone-iodine]; Pcn [penicillins]; and Pentasa [mesalamine]   TREATMENT PLAN:  Effective Dates: 2019 TO 2020 (90 days). Frequency/Duration: 2 times a week for 60- 90 Day(s)    Pre-treatment Symptoms/Complaints: I'm fatigued today. I want to keep going through February to work on strength and activity tolerance. Fatigue level today 7/10. HR 76 bpm, O2 96%  Pain: Initial: Pain Intensity 1: 0 0/10 Post Session:  0/10   Medications Last Reviewed:  1/15/2020  Updated Objective Findings:  None Today  TREATMENT:     THERAPEUTIC EXERCISE: (25 minutes):  Exercises per grid below to improve mobility, strength and balance. Required minimal verbal cues to promote proper body alignment, promote proper body posture and promote proper body mechanics. Progressed resistance, repetitions and complexity of movement as indicated. Date:  1/15/20   Activity/Exercise Parameters   Step ups 6 inch step x 15 reps each leg    Standing :  hip flexion, hip abduction, hip extension X 15 reps each leg, each exercise   Sit to stand From mat table 2x10 reps   Nustep Level 4 x 6 minutes   UBE  Level 1 x 3 minutes fwd and back   Scapular retraction Red tubing 2 x 10 reps   B shoulder horizontal abduction yellow tubing 2 x 10 reps         NEUROMUSCULAR RE-EDUCATION: (19 minutes):  Exercise/activities per grid below to improve balance, coordination, kinesthetic sense, posture and proprioception. Required minimal verbal cues to promote static and dynamic balance in standing.       Activity Date  1/15/20   Activity/Exercise   Sets/reps/equipment   Walking with head turns        Walking with head up & down        Step overs     Over 1/2 foam roll x 20 reps  laterally with no UE assist   Step taps     6 inch step x 30 reps  Fwd and cross with no UE assist   Walking    In long hallway and up/down ramps, VCs for longer steps and for arm swing,  SBA   Sidestepping      Crossovers      [place]      Walking  backwards        Tandem walking      Weaving in/out of cones        Picking up cones        Sports cord        ONEOK ball      Figure 8s       Circles right/left      Walking with 360 degree turns      Spirals      Weight shifting:    Left & Right        Weight shifting: Forward & Backward         Static Standing Balance        Standing with feet apart     Blue foams eyes open and closed   Standing with feet together        Standing with feet semitandem   Blue foams eyes open and closed   Standing with feet tandem      Single leg stance      X1/X2 Viewing exercises        Hallpike-Willard testing for BPPV (Benign Paroxysmal Positional Vertigo)        Cali-Daroff exercises      Canalith Repositioning treatment/Epley Maneuver  for BPPV (Benign Paroxysmal Positional Vertigo)      Smart Equitest Training: See scanned report. [employer] Portal  Treatment/Session Summary:    · Response to Treatment:   Patient tolerated session well. She is dealing with a lot of fatigue, and wants to keep working with PT to work thorough that. Added upper body exercises today without complaints. Continue to progress as tolerated. REASSESS ON Monday AND EXTEND DATES THROUGH February. · Communication/Consultation:  None today  · Equipment provided today:  None today  · Recommendations/Intent for next treatment session: Next visit will focus on therapeutic exercise for strengthening and activity tolerance, balance training.     Total Treatment Billable Duration:  44 minutes  PT Patient Time In/Time Out  Time In: 1005  Time Out: 32623 Prince Mihir Vazquez    Future Appointments   Date Time Provider Milla Betancur   2020 10:15 AM Josie Luisa, PT [facility] SFE   2020  1:30 PM ECHO 52 [facility] U[facility]   2020 10:15 AM Josie Bologna, PT [facility] SFE   2020 10:15 AM Josie Bologna, PT [facility] SFE   2020 10:15 AM Josie Bologna, PT [facility] SFE   2020  2:30 PM [facility] [employer] [address] [facility]   2020  3:00 PM Ofilia Rater [address]   2020 10:40 AM [facility] [employer] [facility] [address] [facility]   2020 11:15 AM Yris Lund MD [facility]   2020 11:30 AM Ofilia Rater [facility]   3/18/2020  2:00 PM Mary Yoon MD BS[facility] BSNE   2020 11:30 AM Guillermo Jade NP [facility] [address] [facility]

## 2020-01-20 ENCOUNTER — HOSPITAL ENCOUNTER (OUTPATIENT)
Dept: PHYSICAL THERAPY | Age: 80
Discharge: HOME | End: 2020-01-20
Attending: PSYCHIATRY & NEUROLOGY
Payer: MEDICARE

## 2020-01-20 PROCEDURE — 97110 THERAPEUTIC EXERCISES: CPT

## 2020-01-20 PROCEDURE — 97112 NEUROMUSCULAR REEDUCATION: CPT

## 2020-01-20 NOTE — PROGRESS NOTES
Oval Mirna  : 1940  [place]  Secondary: Hernán Melendrez 13 at [place] [id number] [facility], [address] [place] [address],[address], [place] [address].  [phone]   [phone]         OUTPATIENT PHYSICAL THERAPY: Daily Treatment Note 2020  Visit Count:  18    ICD-10: Treatment Diagnosis: Other abnormalities of gait and mobility R26.89  Precautions/Allergies:   Betadine [povidone-iodine]; Pcn [penicillins]; and Pentasa [mesalamine]   TREATMENT PLAN:  Effective Dates:  2020 to 2020  Frequency/Duration: 2 times a week for 30-45 more Day(s)    Pre-treatment Symptoms/Complaints: Doing okay. No complaints. Did well after added exercises last time. Fatigue level today 6/10. HR 77 bpm, O2 94%   Pain: Initial: Pain Intensity 1: 0 0/10 Post Session:  0/10   Medications Last Reviewed:  2020  Updated Objective Findings:  None Today  TREATMENT:     THERAPEUTIC EXERCISE: (25 minutes):  Exercises per grid below to improve mobility, strength and balance. Required minimal verbal cues to promote proper body alignment, promote proper body posture and promote proper body mechanics. Progressed resistance, repetitions and complexity of movement as indicated. Date:  20   Activity/Exercise Parameters   Step ups 6 inch step x 15 reps each leg    Standing :  hip flexion, hip abduction, hip extension X 15 reps each leg, each exercise   Sit to stand From mat table 2x10 reps   Nustep Level 4 x 6 minutes   UBE     Scapular retraction Red band2 x 10 reps   B shoulder horizontal abduction Yellow band 2 x 10 reps         NEUROMUSCULAR RE-EDUCATION: (20 minutes):  Exercise/activities per grid below to improve balance, coordination, kinesthetic sense, posture and proprioception. Required minimal verbal cues to promote static and dynamic balance in standing.       Activity    Date  20   Activity/Exercise   Sets/reps/equipment   Walking with head turns Walking with head up & down        Step overs     Over 1/2 foam roll x 20 reps  laterally with no UE assist   Step taps     6 inch step x 30 reps  Fwd and cross with no UE assist   Walking    In long hallway and up/down ramps, VCs for longer steps and for arm swing,  SBA   Sidestepping      Crossovers      [place]      Walking  backwards        Tandem walking      Weaving in/out of cones        Picking up cones        Sports cord        ONEOK ball      Figure 8s       Circles right/left      Walking with 360 degree turns      Spirals      Weight shifting:    Left & Right        Weight shifting: Forward & Backward         Static Standing Balance        Standing with feet apart     Blue foams eyes open and closed   Standing with feet together        Standing with feet semitandem   Blue foams eyes open and closed   Standing with feet tandem      Single leg stance      X1/X2 Viewing exercises        Hallpike-Elgin testing for BPPV (Benign Paroxysmal Positional Vertigo)        Cali-Daroff exercises      Canalith Repositioning treatment/Epley Maneuver  for BPPV (Benign Paroxysmal Positional Vertigo)      Smart Equitest Training: See scanned report. [employer] Portal  Treatment/Session Summary:    · Response to Treatment:   Patient tolerated session well. Patient needs vcs for arm swing and to increase step length for safer gait. Continue to progress as tolerated. · Communication/Consultation:  None today  · Equipment provided today:  None today  · Recommendations/Intent for next treatment session: Next visit will focus on therapeutic exercise for strengthening and activity tolerance, balance training.     Total Treatment Billable Duration:  45 minutes  PT Patient Time In/Time Out  Time In: 6868  Time Out: 1100  Leda Aceves PT    Future Appointments   Date Time Provider Milla Betancur   1/21/2020  1:30 PM ECHO [facility] UCD   1/22/2020 10:15 AM Raven Harman PT [facility] SFE   1/27/2020 10:15 AM Romario Kent, PT [facility] SFE   1/29/2020 10:15 AM Romario Kent, PT [facility]FE[facility]PT SFE   1/31/2020  2:30 PM [facility] OUTREACH INSURANCE [facility]   1/31/2020  3:00 PM Kip Durant [facility]   2/21/2020 10:40 AM [facility] OUTREACH INSURANCE [facility]   2/21/2020 11:15 AM Isaac Wahl MD [facility]   2/21/2020 11:30 AM Kip Share [facility]   3/18/2020  2:00 PM Justus Catalan MD BSNE BSNE   4/21/2020 11:30 AM Judson Camilo NP [facility]

## 2020-01-20 NOTE — THERAPY RECERTIFICATION
Cassidy Moore  : 1940  [place]  Secondary: Hernán Melendrez 13 at [place]  [address], [address], [address] [facility]  [phone]   [phone]           OUTPATIENT PHYSICAL THERAPY:Recertification    ICD-10: Treatment Diagnosis: Other abnormalities of gait and mobility R26.89  Precautions/Allergies:   Betadine [povidone-iodine]; Pcn [penicillins]; and Pentasa [mesalamine]   TREATMENT PLAN:  Effective Dates:  2020 to 2020  Frequency/Duration: 2 times a week for 30-45 more Day(s) MEDICAL/REFERRING DIAGNOSIS:  Parkinson's disease [G20]   DATE OF ONSET: about 3 years PD, about a year [place]. REFERRING PHYSICIAN: Ruddy Arenas,*  MD Orders: PT for Parkinson's  Return MD Appointment:      INITIAL ASSESSMENT:  Ms. Miguel Denise presents with weakness, decreased activity tolerance, imbalance, and unsteady gait. Patient is at risk for falls, and has a history of falls. Patient would benefit from PT to address these problems to improve patient's independence and safety with mobility and daily activities. Thank you. PROGRESS NOTE 12/10/19: Ms. Miguel Denise has attended 9 PT sessions from 19 to 12/10/19 for weakness, decreased activity tolerance, imbalance, and unsteady gait. Patient is demonstrating improving balance and activity tolerance. Patient's score on the Pride Balance Scale has improved from 42/56 to 52/56. Her score on the Five Times Sit to Stand Test has improved from 16.56 seconds to 16 seconds. Her score on the six minute walk test has improved from 600 feet with a rolling walker to 975 feet with no assistive device. Patient is progressing well toward goals. Patient verbalizes feeling stronger and having better activity tolerance. Patient would benefit from continuing PT to address these problems to improve patient's independence and safety with mobility and daily activities.  Thank you.    RECERTIFICATION NOTE 2/04/74: Ms. Anabel Contreras has attended 18 PT sessions from 11/8/19 to 1/20/20 for weakness, decreased activity tolerance, imbalance, and unsteady gait. Patient is demonstrating improving balance and activity tolerance. Patient's score on the Pride Balance Scale has improved from 42/56 to 53/56. Her score on the Five Times Sit to Stand Test has improved from 16.56 seconds to 16 seconds. Her score on the six minute walk test has improved from 600 feet with a rolling walker to 900 feet with no assistive device. With verbal cues to swing arms during gait, she is able to achieve better step length and heel strike. Patient is progressing well toward goals. Patient requested to continue PT through February to further improve her mobility and balance to be able to go back home. Patient would benefit from continuing PT to address these problems to improve patient's independence and safety with mobility and daily activities. Thank you. PROBLEM LIST (Impacting functional limitations):  1. Decreased Strength  2. Decreased ADL/Functional Activities  3. Decreased Transfer Abilities  4. Decreased Ambulation Ability/Technique  5. Decreased Balance  6. Decreased Activity Tolerance  7. Decreased Flexibility/Joint Mobility  8. Decreased Cognition INTERVENTIONS PLANNED: (Treatment may consist of any combination of the following)  1. Balance Exercise  2. Gait Training  3. Home Exercise Program (HEP)  4. Neuromuscular Re-education/Strengthening  5. Therapeutic Activites  6. Therapeutic Exercise/Strengthening  7. Transfer Training   8. GOALS: (Goals have been discussed and agreed upon with patient.)  Short-Term Functional Goals: Time Frame: 2-4 weeks  1. Patient will demonstrate independence and compliance with home exercise program to improve balance and strength for daily activities. MET  2.  Patient will increase her score on the Pride Balance Scale to greater than or equal to 44/56 indicating improved safety and decreased fall risk for daily activities. met  Discharge Goals: Time Frame: 8-12 weeks  1. Patient will increase her score on the Pride Balance Scale to greater than or equal to 48/56 indicating improved safety and decreased fall risk for daily activities. MET  Patient will ambulate with least assistive device over level and unlevel surfaces without evidence of imbalance to improve safety for daily activities. Progressing and ongoing  Patient will demonstrate improved time on Five Time Sit to Stand Test to less than or equal to 14 seconds indicating improved LE strength for daily mobility. Progressing and ongoing  Patient will demonstrate improved score on six minute walk test to 800 feet with stable vital signs indicating improved endurance and mobility for daily activities. MET    OUTCOME MEASURE:   Tool Used: Pride Balance Scale  Score:  Initial: 42/56 Most Recent: 53/56 (Date: 1/20/20 )   Interpretation of Score: Each section is scored on a 0-4 scale, 0 representing the patients inability to perform the task and 4 representing independence. The scores of each section are added together for a total score of 56. The higher the patients score, the more independent the patient is. Any score below 45 indicates increased risk for falls. Tool Used: Five Times Sit to Stand (FTSST or 5xSST)   Score:  Initial: 16.56 seconds Most Recent: 16 seconds (Date:1/20/20 )   Interpretation of Score: This is a measure of functional lower limb muscle strength and quantifying functional change of transitional movements. A score of 12 seconds or less indicates a normal level of function for community dwelling older individuals, a score of 15 seconds or more is at risk for fall.      Tool Used: 6-MINUTE WALK TEST  Score:  Initial: 600 feet with RW Most Recent:900 feet with no AD (Date: 1/20/20 )   Interpretation of Score: Normal range varies but is approximately 5611-8467 Feet      Distance walked: 900 feet with No AD     Baseline End of Test   Heart Rate 77 84   Dyspnea (Luana Scale)     Fatigue (Luana Scale) 6 6   SpO2 95% 96%   BP       Score [id number] 5875-5276 [phone] 15-0     MEDICAL NECESSITY:   · Patient is expected to demonstrate progress in strength, balance, functional technique and mobility to increase independence with daily activities. REASON FOR SERVICES/OTHER COMMENTS:  · Patient continues to demonstrate capacity to improve strength, gait , balance which will increase independence and increase safety. Total Duration:  PT Patient Time In/Time Out  Time In: 1015  Time Out: 1100    Rehabilitation Potential For Stated Goals: Good  Regarding Chana Edge's therapy, I certify that the treatment plan above will be carried out by a therapist or under their direction. Thank you for this referral,  Alejandra Olivarez, PT     Referring Physician Signature: Ade Lindsay,* _______________________________ Date _____________     PAIN/SUBJECTIVE:   Initial: Pain Intensity 1: 0 0 Post Session:  0/10   HISTORY:   History of Injury/Illness (Reason for Referral):  Done with the bulk of chemo and radiation. Falls a lot. No pain. Twisted L ankle the other day with fall, no xray. Dizzy spells. BP has been okay. Can get dizzy sitting, turning around makes her dizzy. Happens about daily. Lasts seconds. Using walker in the house now. Has changed to dressing in sitting. 6/10 fatigue level today. Not doing any exercise at home. No AD before treatment. Past Medical History/Comorbidities:   Ms. Johnnie Pillai  has a past medical history of Anxiety, Cancer ([address] Cancer of right breast ([address] (Dx 04/2019), Crohn's disease ([address] Hypertension, Psychiatric disorder, and Thyroid disease. Ms. Johnnie Pillai  has a past surgical history that includes hx hysterectomy; hx lumbar laminectomy; and ir insert tunl cvc w port over 5 years (4/18/2019).   Social History/Living Environment:     living with niece right now, a few steps to enter; has own house also, steps to basement but can avoid that. Prior Level of Function/Work/Activity:  Independent with RW  Dominant Side:         RIGHT  Previous Treatment Approaches: Oncology rehab with Arnaldo Perdue   Personal Factors:          Sex:  female        Age:  [de-identified] y.o. Ambulatory/Rehab Services H2 Model Falls Risk Assessment   Risk Factors:       (1)  Dizziness/Vertigo       (5)  History of Recent Falls [w/in 3 months] Ability to Rise from Chair:       (0)  Ability to rise in a single movement   Falls Prevention Plan: Mobility Assistance Device (specify):  RW       Exercise/Equipment Adaptation (specify):  close supervision, rest breaks   Total: (5 or greater = High Risk): 6   ©2010 [facility] [place] [address] [place] [address] States Patent #[id number]. Federal Law prohibits the replication, distribution or use without written permission from [facility] [place]   Current Medications:       Current Outpatient Medications:     anastrozole (ARIMIDEX) 1 mg tablet, Take 1 mg by mouth daily. Indications: Hormone Receptor Positive Breast Cancer, Disp: 30 Tab, Rfl: 0    anastrozole (ARIMIDEX) 1 mg tablet, Take 1 mg by mouth daily. , Disp: 90 Tab, Rfl: 3    DISABLED PLACARD (DISABLED PLACARD) DMV, Handicap placard, Disp: 1 Each, Rfl: 0    desvenlafaxine succinate (PRISTIQ) 50 mg ER tablet, Take 50 mg by mouth daily. , Disp: , Rfl: 0    carbidopa-levodopa (SINEMET)  mg per tablet, Take 1 Tab by mouth three (3) times daily. , Disp: 270 Tab, Rfl: 3    potassium chloride (KLOR-CON) 10 mEq tablet, Take 2 Tabs by mouth two (2) times a day. (Patient taking differently: Take 10 mEq by mouth daily.), Disp: 30 Tab, Rfl: 3    LORazepam (ATIVAN) 1 mg tablet, Take 1.5 mg by mouth three (3) times daily. , Disp: , Rfl:     lidocaine-prilocaine (EMLA) topical cream, Apply  to affected area as needed for Pain.  Apply to port site 45-60 minutes before lab appointment, Disp: 30 g, Rfl: 2   amLODIPine (NORVASC) 10 mg tablet, Take  by mouth daily. , Disp: , Rfl:     hydrALAZINE (APRESOLINE) 25 mg tablet, Take 25 mg by mouth four (4) times daily. , Disp: , Rfl:     losartan (COZAAR) 50 mg tablet, Take  by mouth daily. , Disp: , Rfl:     hydroCHLOROthiazide (HYDRODIURIL) 25 mg tablet, Take 25 mg by mouth daily. , Disp: , Rfl:     metoprolol tartrate (LOPRESSOR) 25 mg tablet, Take  by mouth two (2) times a day., Disp: , Rfl:     levothyroxine (SYNTHROID) 75 mcg tablet, Take  by mouth Daily (before breakfast). , Disp: , Rfl:     esomeprazole (NEXIUM) 40 mg capsule, Take  by mouth daily. , Disp: , Rfl:     oxybutynin chloride XL (DITROPAN XL) 10 mg CR tablet, Take 10 mg by mouth daily. , Disp: , Rfl:     melatonin 3 mg tablet, Take 6 mg by mouth nightly., Disp: , Rfl:     OLANZapine (ZYPREXA) 5 mg tablet, Take 5 mg by mouth two (2) times a day., Disp: , Rfl:    Date Last Reviewed:  1/20/20   Number of Personal Factors/Comorbidities that affect the Plan of Care: 1-2: MODERATE COMPLEXITY   EXAMINATION:   Observation/Orthostatic Postural Assessment:          WNL  Strength:          4-/5 B LE  Functional Mobility:         Gait/Ambulation:  Patient ambulates at a slow pace with decreased step length and heel strike, shuffling gait.  Uses RW, but did not bring AD today since she walked in with her niece        Transfers:  independent        Bed Mobility:  independent        Stairs:  NT  Sensation:         intact  Postural Control & Balance:  · Pride Balance Scale:  42/56.   (A score less than 45/56 indicates high risk of falls)     · Dynamic Gait Index:  NT/24.   (A score less than or equal to19/24 is abnormal and predictive of falls)       Coordination:          WNL    Additional Special Tests:     5x sit to stand: 16.56 seconds   6 minute walk test: 600 feet  o Distance:  600 feet  o Assistive Device:  RW  o Orthosis/Brace:  none  o Amount of Assist:  SBA  o Gait Characteristics:  Shuffling/decreased step length          Body Structures Involved:  1. Nerves  2. Muscles Body Functions Affected:  1. Cardio  2. Neuromusculoskeletal  3. Movement Related Activities and Participation Affected:  1. General Tasks and Demands  2. Mobility  3. Domestic Life  4.  Community, Social and [place]   Number of elements (examined above) that affect the Plan of Care: 3: MODERATE COMPLEXITY   CLINICAL PRESENTATION:   Presentation: Evolving clinical presentation with changing clinical characteristics: MODERATE COMPLEXITY   CLINICAL DECISION MAKING:   Use of outcome tool(s) and clinical judgement create a POC that gives a: Questionable prediction of patient's progress: MODERATE COMPLEXITY

## 2020-01-22 ENCOUNTER — HOSPITAL ENCOUNTER (OUTPATIENT)
Dept: PHYSICAL THERAPY | Age: 80
Discharge: HOME | End: 2020-01-22
Attending: PSYCHIATRY & NEUROLOGY
Payer: MEDICARE

## 2020-01-22 PROCEDURE — 97110 THERAPEUTIC EXERCISES: CPT

## 2020-01-22 PROCEDURE — 97112 NEUROMUSCULAR REEDUCATION: CPT

## 2020-01-22 NOTE — PROGRESS NOTES
Francie Hightower  : 1940  [place]  Secondary: Hernán Melendrez 13 at [place] [id number] [facility], [address] [place] [address],[address], [place] [address].  [phone]   [phone]         OUTPATIENT PHYSICAL THERAPY: Daily Treatment Note 2020  Visit Count:  19    ICD-10: Treatment Diagnosis: Other abnormalities of gait and mobility R26.89  Precautions/Allergies:   Betadine [povidone-iodine]; Pcn [penicillins]; and Pentasa [mesalamine]   TREATMENT PLAN:  Effective Dates:  2020 to 2020  Frequency/Duration: 2 times a week for 30-45 more Day(s)    Pre-treatment Symptoms/Complaints: Doing okay. It's cold outside. I'm not doing too good today, feeling tired. Going to visit the [facility] center today to see what activities/exercises they offer. Fatigue level today 7/10. HR 79 bpm, O2 96%   Pain: Initial: Pain Intensity 1: 0 0/10 Post Session:  0/10   Medications Last Reviewed:  2020  Updated Objective Findings:  None Today  TREATMENT:     THERAPEUTIC EXERCISE: (25 minutes):  Exercises per grid below to improve mobility, strength and balance. Required minimal verbal cues to promote proper body alignment, promote proper body posture and promote proper body mechanics. Progressed resistance, repetitions and complexity of movement as indicated. Date:  20   Activity/Exercise Parameters   Step ups 6 inch step x 15 reps each leg    Standing :  hip flexion, hip abduction, hip extension X 15 reps each leg, each exercise   Sit to stand From mat table 2x10 reps   Nustep Level 4 x 6 minutes   UBE     Scapular retraction Red band2 x 10 reps   B shoulder horizontal abduction Yellow band 2 x 10 reps         NEUROMUSCULAR RE-EDUCATION: (20 minutes):  Exercise/activities per grid below to improve balance, coordination, kinesthetic sense, posture and proprioception.   Required minimal verbal cues to promote static and dynamic balance in standing. Activity    Date  1/22/20   Activity/Exercise   Sets/reps/equipment   Walking with head turns        Walking with head up & down        Step overs     Over 1/2 foam roll x 20 reps  laterally with no UE assist   Step taps     6 inch step x 30 reps  Fwd and cross with no UE assist   Walking    In long hallway and up/down ramps, VCs for longer steps and for arm swing,  SBA   Sidestepping      Crossovers      [place]      Walking  backwards        Tandem walking      Weaving in/out of cones        Picking up cones        Sports cord        ONEOK ball      Figure 8s       Circles right/left      Walking with 360 degree turns      Spirals      Weight shifting:    Left & Right        Weight shifting: Forward & Backward         Static Standing Balance        Standing with feet apart     Blue foams eyes open and closed   Standing with feet together        Standing with feet semitandem   Blue foams eyes open and closed   Standing with feet tandem      Single leg stance      X1/X2 Viewing exercises        Hallpike-Mindy testing for BPPV (Benign Paroxysmal Positional Vertigo)        Cali-Daroff exercises      Canalith Repositioning treatment/Epley Maneuver  for BPPV (Benign Paroxysmal Positional Vertigo)      Smart Equitest Training: See scanned report. [employer] Portal  Treatment/Session Summary:    · Response to Treatment:   Patient tolerated session well. Patient needs frequent rest breaks during session due to fatigue. She has no SOB. She completes all exercises without complaints. Balance has improved significantly since evaluation, but patient's activity tolerance/fatigue is still a mathew for patient. Continue to progress as tolerated.    · Communication/Consultation:  None today  · Equipment provided today:  None today  · Recommendations/Intent for next treatment session: Next visit will focus on therapeutic exercise for strengthening and activity tolerance, balance training.     Total Treatment Billable Duration:  45 minutes  PT Patient Time In/Time Out  Time In: 9193  Time Out: [address], PT    Future Appointments   Date Time Provider Milla Betancur   1/27/2020 10:15 AM Joel Diaz, PT [facility] SFE   1/29/2020 10:15 AM [facility]   1/31/2020  2:30 PM [facility] OUTREACH INSURANCE [facility]   1/31/2020  3:00 PM Halle You [facility]   2/21/2020 10:40 AM [facility] OUTREACH INSURANCE [facility]   2/21/2020 11:15 AM Yoanna Lovelace MD [facility]   2/21/2020 11:30 AM Halle You [facility]   3/18/2020  2:00 PM Reynold Kline MD [facility]   4/21/2020 11:30 AM Cassandra Burnett NP [facility] BEHAVIORAL HEALTH SERVICES [facility]

## 2020-01-23 ENCOUNTER — PATIENT OUTREACH (OUTPATIENT)
Dept: CASE MANAGEMENT | Age: 80
End: 2020-01-23

## 2020-01-27 ENCOUNTER — HOSPITAL ENCOUNTER (OUTPATIENT)
Dept: PHYSICAL THERAPY | Age: 80
Discharge: HOME | End: 2020-01-27
Attending: PSYCHIATRY & NEUROLOGY
Payer: MEDICARE

## 2020-01-27 PROCEDURE — 97110 THERAPEUTIC EXERCISES: CPT

## 2020-01-27 PROCEDURE — 97112 NEUROMUSCULAR REEDUCATION: CPT

## 2020-01-27 NOTE — PROGRESS NOTES
Oval Mirna  : 1940  [place]  Secondary: Hernán Melendrez 13 at [place] [id number] [facility], [address] [place] [address],[address], [place] [address].  [phone]   [phone]         OUTPATIENT PHYSICAL THERAPY: Daily Treatment Note 2020  Visit Count:  20    ICD-10: Treatment Diagnosis: Other abnormalities of gait and mobility R26.89  Precautions/Allergies:   Betadine [povidone-iodine]; Pcn [penicillins]; and Pentasa [mesalamine]   TREATMENT PLAN:  Effective Dates:  2020 to 2020  Frequency/Duration: 2 times a week for 30-45 more Day(s)    Pre-treatment Symptoms/Complaints: \"nothing new\". Fatigue level today 7/10. HR 77 bpm, O2 98%   Pain: Initial: Pain Intensity 1: 0 0/10 Post Session:  0/10   Medications Last Reviewed:  2020  Updated Objective Findings:  None Today  TREATMENT:     THERAPEUTIC EXERCISE: (25 minutes):  Exercises per grid below to improve mobility, strength and balance. Required minimal verbal cues to promote proper body alignment, promote proper body posture and promote proper body mechanics. Progressed resistance, repetitions and complexity of movement as indicated. Date:  20   Activity/Exercise Parameters   Step ups 6 inch step x 15 reps each leg    Standing :  hip flexion, hip abduction, hip extension X 15 reps each leg, each exercise   Sit to stand From mat table 2x10 reps   Nustep Level 4 x 6 minutes   UBE     Scapular retraction Red band2 x 10 reps   B shoulder horizontal abduction Yellow band 2 x 10 reps         NEUROMUSCULAR RE-EDUCATION: (16 minutes):  Exercise/activities per grid below to improve balance, coordination, kinesthetic sense, posture and proprioception. Required minimal verbal cues to promote static and dynamic balance in standing.       Activity    Date  20   Activity/Exercise   Sets/reps/equipment   Walking with head turns        Walking with head up & down        Step overs     Over 1/2 foam roll x 20 reps  laterally with no UE assist   Step taps     6 inch step x 30 reps  Fwd and cross with no UE assist   Walking    In long hallway and up/down ramps, VCs for longer steps and for arm swing,  SBA   Sidestepping      Crossovers      [place]      Walking  backwards        Tandem walking      Weaving in/out of cones        Picking up cones        Sports cord        [employer]OK ball      Figure 8s       Circles right/left      Walking with 360 degree turns      Spirals      Weight shifting:    Left & Right        Weight shifting: Forward & Backward         Static Standing Balance        Standing with feet apart     Blue foams eyes open and closed   Standing with feet together        Standing with feet semitandem   Blue foams eyes open and closed   Standing with feet tandem      Single leg stance      X1/X2 Viewing exercises        Hallpike-Mindy testing for BPPV (Benign Paroxysmal Positional Vertigo)        Cali-Daroff exercises      Canalith Repositioning treatment/Epley Maneuver  for BPPV (Benign Paroxysmal Positional Vertigo)      Smart Equitest Training: See scanned report. [employer] Portal  Treatment/Session Summary:    · Response to Treatment:   Patient tolerated session well. Balance has improved with static and dynamic activities. Patient continues to have some problems with fatigue. Patient has visited a senior center where she can take some exercise classes when she is finished with PT to maintain strength and endurance. Continue to progress as tolerated. · Communication/Consultation:  None today  · Equipment provided today:  None today  · Recommendations/Intent for next treatment session: Next visit will focus on therapeutic exercise for strengthening and activity tolerance, balance training.     Total Treatment Billable Duration:  41 minutes  PT Patient Time In/Time Out  Time In: 1014  Time Out: Donta Fitzpatrick, LUIS    Future Appointments Date Time Provider Milla Betancur   1/29/2020 10:15 AM Alveda Clonts, PT [facility]PT [facility]   1/31/2020  2:30 PM [facility]   1/31/2020  3:00 PM [facility]   2/3/2020 11:00 AM Alveda Clonts, PT [facility]PT [facility]   2/5/2020 10:15 AM Alveda Clonts, PT [facility]   2/10/2020 11:00 AM Alveda Clonts, PT [facility]PT [facility]   2/12/2020 10:15 AM Alveda Clonts, PT [facility]   2/17/2020 10:15 AM Alveda Clonts, PT [facility]PT S[facility]   2/19/2020 10:15 AM Alveda Clonts, PT [facility]PT S[facility]   2/21/2020 10:40 AM [facility]   2/21/2020 11:15 AM Romulo Mcclure MD [facility]   2/21/2020 11:30 AM NUR9 [facility]   2/24/2020 10:15 AM Alveda Clonts, PT [facility]ORPT S[facility]   2/26/2020 10:15 AM Alveda Clonts, PT [facility]PT [facility]   3/18/2020  2:00 PM Tate HALEY MD [facility]   4/21/2020 11:30 AM Brandy Shields NP [facility] BEHAVIORAL HEALTH SERVICES [facility] [address]

## 2020-01-29 ENCOUNTER — HOSPITAL ENCOUNTER (OUTPATIENT)
Dept: PHYSICAL THERAPY | Age: 80
Discharge: HOME | End: 2020-01-29
Attending: PSYCHIATRY & NEUROLOGY
Payer: MEDICARE

## 2020-01-29 PROCEDURE — 97110 THERAPEUTIC EXERCISES: CPT

## 2020-01-29 PROCEDURE — 97112 NEUROMUSCULAR REEDUCATION: CPT

## 2020-01-29 NOTE — PROGRESS NOTES
Ana Paula Caballero  : 1940  [place]  Secondary: Hernán [address]arnulfo 13 at [address], [address] [place] [address],[address], [place] [address].  [phone]   [phone]         OUTPATIENT PHYSICAL THERAPY: Daily Treatment Note 2020  Visit Count:  21    ICD-10: Treatment Diagnosis: Other abnormalities of gait and mobility R26.89  Precautions/Allergies:   Betadine [povidone-iodine]; Pcn [penicillins]; and Pentasa [mesalamine]   TREATMENT PLAN:  Effective Dates:  2020 to 2020  Frequency/Duration: 2 times a week for 30-45 more Day(s)    Pre-treatment Symptoms/Complaints: I am just really fatigued. Did okay after last session. Fatigue level today 7/10. HR 79 bpm, O2 98%   Pain: Initial: Pain Intensity 1: 0 0/10 Post Session:  0/10   Medications Last Reviewed:  2020  Updated Objective Findings:  None Today  TREATMENT:     THERAPEUTIC EXERCISE: (25 minutes):  Exercises per grid below to improve mobility, strength and balance. Required minimal verbal cues to promote proper body alignment, promote proper body posture and promote proper body mechanics. Progressed resistance, repetitions and complexity of movement as indicated. Date:  20   Activity/Exercise Parameters   Step ups 6 inch step x 15 reps each leg    Standing :  hip flexion, hip abduction, hip extension X 15 reps each leg, each exercise   Sit to stand From mat table 2x10 reps   Nustep Level 4 x 6 minutes   UBE     Scapular retraction Red band2 x 10 reps   B shoulder horizontal abduction Yellow band 2 x 10 reps         NEUROMUSCULAR RE-EDUCATION: (15 minutes):  Exercise/activities per grid below to improve balance, coordination, kinesthetic sense, posture and proprioception. Required minimal verbal cues to promote static and dynamic balance in standing.       Activity    Date  20   Activity/Exercise   Sets/reps/equipment   Walking with head turns Walking with head up & down        Step overs     Over 1/2 foam roll x 20 reps  laterally with no UE assist   Step taps     6 inch step x 30 reps  Fwd and cross with no UE assist   Walking    In long hallway and up/down ramps, VCs for longer steps and for arm swing,  SBA   Sidestepping      Crossovers      [place]      Walking  backwards        Tandem walking      Weaving in/out of cones        Picking up cones        Sports cord        [employer] ball      Figure 8s       Circles right/left      Walking with 360 degree turns      Spirals      Weight shifting:    Left & Right        Weight shifting: Forward & Backward         Static Standing Balance        Standing with feet apart     Blue foams eyes open and closed   Standing with feet together        Standing with feet semitandem   Blue foams eyes open and closed   Standing with feet tandem      Single leg stance      X1/X2 Viewing exercises        Hallpike-Holland testing for BPPV (Benign Paroxysmal Positional Vertigo)        Cali-Daroff exercises      Canalith Repositioning treatment/Epley Maneuver  for BPPV (Benign Paroxysmal Positional Vertigo)      Smart Equitest Training: See scanned report. [employer] Portal  Treatment/Session Summary:    · Response to Treatment:   Patient tolerated session well. She reports feeling significant fatigue but completes all activities well with rest breaks. Continue to progress as tolerated. · Communication/Consultation:  None today  · Equipment provided today:  None today  · Recommendations/Intent for next treatment session: Next visit will focus on therapeutic exercise for strengthening and activity tolerance, balance training.     Total Treatment Billable Duration:  40 minutes  PT Patient Time In/Time Out  Time In: 5476  Time Out: Donta Lua Enter    Future Appointments   Date Time Provider Milla Betancur   1/31/2020  2:30 PM Rachelle [address] [place] [facility]   1/31/2020  3:00 PM Von Master [facility]   2/3/2020 11:00 AM Candace Hammock, PT [facility]PT [facility]   2/5/2020 10:15 AM Candace Hammock, PT [facility]   2/10/2020 11:00 AM Candace Hammock, PT [facility]   2/12/2020 10:15 AM Candace Hammock, PT [facility]   2/17/2020 10:15 AM Candace Hammock, PT [facility]   2/19/2020 10:15 AM Candace Hammock, PT [facility]PT [facility]   2/21/2020 10:40 AM GCC OUTREACH INSURANCE [facility] [address] [place] [address]   2/21/2020 11:15 AM Gosia Lorenz MD [facility]   2/21/2020 11:30 AM NUR9 [facility] [address] [facility]   2/24/2020 10:15 AM Candace Hammock, PT [facility]   2/26/2020 10:15 AM Candace Hammock, PT [facility]PT [facility]   3/18/2020  2:00 PM Ade HALEY MD [facility]   4/21/2020 11:30 AM Robe Caldwell NP [facility] BEHAVIORAL HEALTH SERVICES [facility] [address] [facility]

## 2020-01-31 ENCOUNTER — HOSPITAL ENCOUNTER (OUTPATIENT)
Dept: LAB | Age: 80
Discharge: HOME | End: 2020-01-31
Payer: MEDICARE

## 2020-01-31 ENCOUNTER — HOSPITAL ENCOUNTER (OUTPATIENT)
Dept: INFUSION THERAPY | Age: 80
Discharge: HOME | End: 2020-01-31
Payer: MEDICARE

## 2020-01-31 VITALS
OXYGEN SATURATION: 95 % | TEMPERATURE: 97.8 F | BODY MASS INDEX: 27.53 KG/M2 | HEART RATE: 90 BPM | WEIGHT: 168 LBS | RESPIRATION RATE: 18 BRPM | DIASTOLIC BLOOD PRESSURE: 57 MMHG | SYSTOLIC BLOOD PRESSURE: 133 MMHG

## 2020-01-31 DIAGNOSIS — E86.0 DEHYDRATION: ICD-10-CM

## 2020-01-31 DIAGNOSIS — C50.911 MALIGNANT NEOPLASM OF RIGHT BREAST IN FEMALE, ESTROGEN RECEPTOR POSITIVE, UNSPECIFIED SITE OF BREAST (HCC): Primary | ICD-10-CM

## 2020-01-31 DIAGNOSIS — M85.88 OSTEOPENIA OF SPINE: ICD-10-CM

## 2020-01-31 DIAGNOSIS — Z17.0 MALIGNANT NEOPLASM OF RIGHT BREAST IN FEMALE, ESTROGEN RECEPTOR POSITIVE, UNSPECIFIED SITE OF BREAST (HCC): Primary | ICD-10-CM

## 2020-01-31 LAB
ALBUMIN SERPL-MCNC: 3.3 G/DL (ref 3.2–4.6)
ALBUMIN/GLOB SERPL: 0.7 {RATIO} (ref 1.2–3.5)
ALP SERPL-CCNC: 112 U/L (ref 50–136)
ALT SERPL-CCNC: 13 U/L (ref 12–65)
ANION GAP SERPL CALC-SCNC: 7 MMOL/L (ref 7–16)
AST SERPL-CCNC: 27 U/L (ref 15–37)
BASOPHILS # BLD: 0.1 K/UL (ref 0–0.2)
BASOPHILS NFR BLD: 1 % (ref 0–2)
BILIRUB SERPL-MCNC: 0.3 MG/DL (ref 0.2–1.1)
BUN SERPL-MCNC: 17 MG/DL (ref 8–23)
CALCIUM SERPL-MCNC: 9.6 MG/DL (ref 8.3–10.4)
CHLORIDE SERPL-SCNC: 107 MMOL/L (ref 98–107)
CO2 SERPL-SCNC: 25 MMOL/L (ref 21–32)
CREAT SERPL-MCNC: 0.94 MG/DL (ref 0.6–1)
DIFFERENTIAL METHOD BLD: ABNORMAL
EOSINOPHIL # BLD: 0.1 K/UL (ref 0–0.8)
EOSINOPHIL NFR BLD: 2 % (ref 0.5–7.8)
ERYTHROCYTE [DISTWIDTH] IN BLOOD BY AUTOMATED COUNT: 15.5 % (ref 11.9–14.6)
GLOBULIN SER CALC-MCNC: 4.7 G/DL (ref 2.3–3.5)
GLUCOSE SERPL-MCNC: 98 MG/DL (ref 65–100)
HCT VFR BLD AUTO: 35.4 % (ref 35.8–46.3)
HGB BLD-MCNC: 11.1 G/DL (ref 11.7–15.4)
IMM GRANULOCYTES # BLD AUTO: 0 K/UL (ref 0–0.5)
IMM GRANULOCYTES NFR BLD AUTO: 0 % (ref 0–5)
LYMPHOCYTES # BLD: 1.5 K/UL (ref 0.5–4.6)
LYMPHOCYTES NFR BLD: 33 % (ref 13–44)
MAGNESIUM SERPL-MCNC: 2 MG/DL (ref 1.8–2.4)
MCH RBC QN AUTO: 26.7 PG (ref 26.1–32.9)
MCHC RBC AUTO-ENTMCNC: 31.4 G/DL (ref 31.4–35)
MCV RBC AUTO: 85.3 FL (ref 79.6–97.8)
MONOCYTES # BLD: 0.4 K/UL (ref 0.1–1.3)
MONOCYTES NFR BLD: 9 % (ref 4–12)
NEUTS SEG # BLD: 2.6 K/UL (ref 1.7–8.2)
NEUTS SEG NFR BLD: 55 % (ref 43–78)
NRBC # BLD: 0 K/UL (ref 0–0.2)
PLATELET # BLD AUTO: 104 K/UL (ref 150–450)
PMV BLD AUTO: 10.7 FL (ref 9.4–12.3)
POTASSIUM SERPL-SCNC: 3.5 MMOL/L (ref 3.5–5.1)
PROT SERPL-MCNC: 8 G/DL (ref 6.3–8.2)
RBC # BLD AUTO: 4.15 M/UL (ref 4.05–5.25)
SODIUM SERPL-SCNC: 139 MMOL/L (ref 136–145)
WBC # BLD AUTO: 4.7 K/UL (ref 4.3–11.1)

## 2020-01-31 PROCEDURE — 96375 TX/PRO/DX INJ NEW DRUG ADDON: CPT

## 2020-01-31 PROCEDURE — 96413 CHEMO IV INFUSION 1 HR: CPT

## 2020-01-31 PROCEDURE — 74011250636 HC RX REV CODE- 250/636: Performed by: NURSE PRACTITIONER

## 2020-01-31 PROCEDURE — 80053 COMPREHEN METABOLIC PANEL: CPT

## 2020-01-31 PROCEDURE — 85025 COMPLETE CBC W/AUTO DIFF WBC: CPT

## 2020-01-31 PROCEDURE — 96372 THER/PROPH/DIAG INJ SC/IM: CPT

## 2020-01-31 PROCEDURE — 83735 ASSAY OF MAGNESIUM: CPT

## 2020-01-31 RX ORDER — EPINEPHRINE 1 MG/ML
0.3 INJECTION, SOLUTION, CONCENTRATE INTRAVENOUS AS NEEDED
Status: CANCELLED | OUTPATIENT
Start: 2020-01-31

## 2020-01-31 RX ORDER — ACETAMINOPHEN 325 MG/1
650 TABLET ORAL AS NEEDED
Status: CANCELLED
Start: 2020-01-31

## 2020-01-31 RX ORDER — ALBUTEROL SULFATE 0.83 MG/ML
2.5 SOLUTION RESPIRATORY (INHALATION) AS NEEDED
Status: CANCELLED
Start: 2020-01-31

## 2020-01-31 RX ORDER — DIPHENHYDRAMINE HYDROCHLORIDE 50 MG/ML
50 INJECTION, SOLUTION INTRAMUSCULAR; INTRAVENOUS AS NEEDED
Status: CANCELLED
Start: 2020-01-31

## 2020-01-31 RX ORDER — HYDROCORTISONE SODIUM SUCCINATE 100 MG/2ML
100 INJECTION, POWDER, FOR SOLUTION INTRAMUSCULAR; INTRAVENOUS AS NEEDED
Status: CANCELLED | OUTPATIENT
Start: 2020-01-31

## 2020-01-31 RX ORDER — HEPARIN 100 UNIT/ML
300-500 SYRINGE INTRAVENOUS AS NEEDED
Status: CANCELLED
Start: 2020-01-31

## 2020-01-31 RX ORDER — SODIUM CHLORIDE 9 MG/ML
25 INJECTION, SOLUTION INTRAVENOUS CONTINUOUS
Status: DISCONTINUED | OUTPATIENT
Start: 2020-01-31 | End: 2020-02-01 | Stop reason: HOSPADM

## 2020-01-31 RX ORDER — ONDANSETRON 2 MG/ML
8 INJECTION INTRAMUSCULAR; INTRAVENOUS AS NEEDED
Status: CANCELLED | OUTPATIENT
Start: 2020-01-31

## 2020-01-31 RX ORDER — SODIUM CHLORIDE 0.9 % (FLUSH) 0.9 %
10 SYRINGE (ML) INJECTION AS NEEDED
Status: DISCONTINUED | OUTPATIENT
Start: 2020-01-31 | End: 2020-02-01 | Stop reason: HOSPADM

## 2020-01-31 RX ORDER — ONDANSETRON 2 MG/ML
8 INJECTION INTRAMUSCULAR; INTRAVENOUS ONCE
Status: COMPLETED | OUTPATIENT
Start: 2020-01-31 | End: 2020-01-31

## 2020-01-31 RX ORDER — SODIUM CHLORIDE 9 MG/ML
10 INJECTION INTRAMUSCULAR; INTRAVENOUS; SUBCUTANEOUS AS NEEDED
Status: CANCELLED | OUTPATIENT
Start: 2020-01-31

## 2020-01-31 RX ADMIN — DENOSUMAB 60 MG: 60 INJECTION SUBCUTANEOUS at 16:00

## 2020-01-31 RX ADMIN — Medication 10 ML: at 16:30

## 2020-01-31 RX ADMIN — ONDANSETRON 8 MG: 2 INJECTION INTRAMUSCULAR; INTRAVENOUS at 15:44

## 2020-01-31 RX ADMIN — Medication 10 ML: at 15:42

## 2020-01-31 RX ADMIN — SODIUM CHLORIDE 25 ML/HR: 900 INJECTION, SOLUTION INTRAVENOUS at 15:42

## 2020-01-31 RX ADMIN — ADO-TRASTUZUMAB EMTANSINE 260 MG: 20 INJECTION, POWDER, LYOPHILIZED, FOR SOLUTION INTRAVENOUS at 15:56

## 2020-01-31 NOTE — PROGRESS NOTES
Arrived to the [facility]. Kadcyla and Prolia injection completed. Patient tolerated well. Any issues or concerns during appointment: none. Patient aware of next infusion appointment on 2/21 (date) at 11:30 AM (time). Discharged ambulatory.

## 2020-02-03 ENCOUNTER — HOSPITAL ENCOUNTER (OUTPATIENT)
Dept: PHYSICAL THERAPY | Age: 80
Discharge: HOME | End: 2020-02-03
Attending: PSYCHIATRY & NEUROLOGY
Payer: MEDICARE

## 2020-02-03 PROCEDURE — 97112 NEUROMUSCULAR REEDUCATION: CPT

## 2020-02-03 PROCEDURE — 97110 THERAPEUTIC EXERCISES: CPT

## 2020-02-03 NOTE — PROGRESS NOTES
Geeta Ro  : 1940  [place]  Secondary: Hernán Melendrez 13 at [address], [address] [place] [address],[address], [place] [address].  [phone]   [phone]         OUTPATIENT PHYSICAL THERAPY: Daily Treatment Note 2/3/2020  Visit Count:  22    ICD-10: Treatment Diagnosis: Other abnormalities of gait and mobility R26.89  Precautions/Allergies:   Betadine [povidone-iodine]; Pcn [penicillins]; and Pentasa [mesalamine]   TREATMENT PLAN:  Effective Dates:  2020 to 2020  Frequency/Duration: 2 times a week for 30-45 more Day(s)    Pre-treatment Symptoms/Complaints: Doing well. No complaints. Fatigue level today 7/10. HR 85 bpm, O2 94%   Pain: Initial: Pain Intensity 1: 0 0/10 Post Session:  0/10   Medications Last Reviewed:  2/3/2020  Updated Objective Findings:  None Today  TREATMENT:     THERAPEUTIC EXERCISE: (25 minutes):  Exercises per grid below to improve mobility, strength and balance. Required minimal verbal cues to promote proper body alignment, promote proper body posture and promote proper body mechanics. Progressed resistance, repetitions and complexity of movement as indicated. Date:  2/3/20   Activity/Exercise Parameters   Step ups 6 inch step x 15 reps each leg    Standing :  hip flexion, hip abduction, hip extension X 15 reps each leg, each exercise   Sit to stand From mat table 2x10 reps   Nustep Level 4 x 6 minutes   UBE     Scapular retraction Red band2 x 10 reps   B shoulder horizontal abduction Yellow band 2 x 10 reps         NEUROMUSCULAR RE-EDUCATION: (20 minutes):  Exercise/activities per grid below to improve balance, coordination, kinesthetic sense, posture and proprioception. Required minimal verbal cues to promote static and dynamic balance in standing.       Activity    Date  2/3/20   Activity/Exercise   Sets/reps/equipment   Walking with head turns        Walking with head up & down Step overs     Over 1/2 foam roll x 20 reps  laterally with no UE assist   Step taps     6 inch step x 30 reps  Fwd and cross with no UE assist   Walking    In long hallway and up/down ramps, VCs for longer steps and for arm swing,  SBA   Sidestepping      Crossovers      [place]      Walking  backwards        Tandem walking      Weaving in/out of cones        Picking up cones        Sports cord        [employer] ball      Figure 8s       Circles right/left      Walking with 360 degree turns      Spirals      Weight shifting:    Left & Right        Weight shifting: Forward & Backward         Static Standing Balance        Standing with feet apart     Blue foams eyes open and closed   Standing with feet together        Standing with feet semitandem   Blue foams eyes open and closed   Standing with feet tandem      Single leg stance      X1/X2 Viewing exercises        Hallpike-Mindy testing for BPPV (Benign Paroxysmal Positional Vertigo)        Cali-Daroff exercises      Canalith Repositioning treatment/Epley Maneuver  for BPPV (Benign Paroxysmal Positional Vertigo)      Smart Equitest Training: See scanned report. [employer] Portal  Treatment/Session Summary:    · Response to Treatment:   Patient tolerated session well. She reported feeling tired at end of session. Balance and strength are progressing, but fatigue remains the same. Continue to progress as tolerated. · Communication/Consultation:  None today  · Equipment provided today:  None today  · Recommendations/Intent for next treatment session: Next visit will focus on therapeutic exercise for strengthening and activity tolerance, balance training.     Total Treatment Billable Duration:  45 minutes  PT Patient Time In/Time Out  Time In: 1055  Time Out: [place] 43 Rozella Fothergill, PT    Future Appointments   Date Time Provider Milla Betancur   2/5/2020 10:15 AM [facility]   2/10/2020 11:00 AM Gilmer Ayon G, PT [facility]   2/12/2020 10:15 AM Toni Parks, PT [facility]   2/17/2020 10:15 AM Toni Parks, PT [facility]   2/19/2020 10:15 AM Muñoz Charly, PT [facility]PT [facility]   2/21/2020 10:40 AM GCC OUTREACH INSURANCE [facility]   2/21/2020 11:15 AM Rolf Sexton MD [facility]   2/21/2020 11:30 AM [facility]   2/24/2020 10:15 AM Toni Parks, PT [facility]PT [facility]   2/26/2020 10:15 AM Toni Parks, PT S[facility]PT [facility]   3/18/2020  2:00 PM Noemi HALEY MD [facility] BS[facility]   4/21/2020 11:30 AM Yeimi Mcintyre NP [facility] BEHAVIORAL HEALTH SERVICES [facility]

## 2020-02-05 ENCOUNTER — HOSPITAL ENCOUNTER (OUTPATIENT)
Dept: PHYSICAL THERAPY | Age: 80
Discharge: HOME | End: 2020-02-05
Attending: PSYCHIATRY & NEUROLOGY
Payer: MEDICARE

## 2020-02-05 PROCEDURE — 97110 THERAPEUTIC EXERCISES: CPT

## 2020-02-05 PROCEDURE — 97112 NEUROMUSCULAR REEDUCATION: CPT

## 2020-02-05 NOTE — PROGRESS NOTES
Kristen Sabetha  : 1940  [place]  Secondary: Hernán Melendrez 13 at [address], [address] [place] [address],[address], [place] [address].  [phone]   [phone]         OUTPATIENT PHYSICAL THERAPY: Daily Treatment Note 2020  Visit Count:  23    ICD-10: Treatment Diagnosis: Other abnormalities of gait and mobility R26.89  Precautions/Allergies:   Betadine [povidone-iodine]; Pcn [penicillins]; and Pentasa [mesalamine]   TREATMENT PLAN:  Effective Dates:  2020 to 2020  Frequency/Duration: 2 times a week for 30-45 more Day(s)    Pre-treatment Symptoms/Complaints: Still exhausted. Fatigue level today 7/10. HR 89 bpm, O2 96%   Pain: Initial: Pain Intensity 1: 0 0/10 Post Session:  0/10   Medications Last Reviewed:  2020  Updated Objective Findings:  None Today  TREATMENT:     THERAPEUTIC EXERCISE: (25 minutes):  Exercises per grid below to improve mobility, strength and balance. Required minimal verbal cues to promote proper body alignment, promote proper body posture and promote proper body mechanics. Progressed resistance, repetitions and complexity of movement as indicated. Date:  20   Activity/Exercise Parameters   Step ups 6 inch step x 15 reps each leg    Standing :  hip flexion, hip abduction, hip extension X 15 reps each leg, each exercise   Sit to stand From mat table 2x10 reps   Nustep Level 4 x 6 minutes   UBE     Scapular retraction Red band2 x 10 reps   B shoulder horizontal abduction Yellow band 2 x 10 reps         NEUROMUSCULAR RE-EDUCATION: (20 minutes):  Exercise/activities per grid below to improve balance, coordination, kinesthetic sense, posture and proprioception. Required minimal verbal cues to promote static and dynamic balance in standing.       Activity    Date  20   Activity/Exercise   Sets/reps/equipment   Walking with head turns        Walking with head up & down        Step overs     Over 1/2 foam roll x 20 reps  laterally with no UE assist   Step taps     6 inch step x 30 reps  Fwd and cross with no UE assist   Walking    In long hallway and up/down ramps, VCs for longer steps and for arm swing,  SBA   Sidestepping      Crossovers      [place]      Walking  backwards        Tandem walking      Weaving in/out of cones        Picking up cones        Sports cord        [employer] ball      Figure 8s       Circles right/left      Walking with 360 degree turns      Spirals      Weight shifting:    Left & Right        Weight shifting: Forward & Backward         Static Standing Balance        Standing with feet apart     Blue foams eyes open and closed   Standing with feet together        Standing with feet semitandem   Blue foams eyes open and closed   Standing with feet tandem      Single leg stance      X1/X2 Viewing exercises        Hallpike-[place] testing for BPPV (Benign Paroxysmal Positional Vertigo)        Cali-Daroff exercises      Canalith Repositioning treatment/Epley Maneuver  for BPPV (Benign Paroxysmal Positional Vertigo)      Smart Equitest Training: See scanned report. "[employer] Portal  Treatment/Session Summary:    · Response to Treatment:   Patient tolerated session well. She says she feels better at the end of sessions, despite continued fatigue. Encouraging patient to exercise and be active at home. Continue to progress as tolerated. · Communication/Consultation:  None today  · Equipment provided today:  None today  · Recommendations/Intent for next treatment session: Next visit will focus on therapeutic exercise for strengthening and activity tolerance, balance training.     Total Treatment Billable Duration:  45 minutes  PT Patient Time In/Time Out  Time In: 1005  Time Out: [address] [facility] Purnima Saucedo    Future Appointments   Date Time Provider Milla Betancur   2/10/2020 11:00 AM Areli Brar, PT [facility]   2/12/2020 10:15 AM Devan Crimes, PT [facility]PT [facility]   2/17/2020 10:15 AM Devan Crimes, PT [facility]ORPT [facility]   2/19/2020 10:15 AM Devan Crimes, PT S[facility]ORPT [facility]   2/21/2020 10:40 AM GCC OUTREACH INSURANCE [facility]   2/21/2020 11:15 AM Rojean Blizzard, MD [facility]   2/21/2020 11:30 AM NUR9 [facility] [address]   2/24/2020 10:15 AM Devan Crimes, PT S[facility]ORPT S[facility]   2/26/2020 10:15 AM Devan Crimes, PT SFEORPT S[facility]   3/18/2020  2:00 PM Esa HALEY MD [facility]   4/21/2020 11:30 AM Yobani Apple NP [facility] BEHAVIORAL HEALTH SERVICES [facility] [address] [facility]

## 2020-02-10 ENCOUNTER — HOSPITAL ENCOUNTER (OUTPATIENT)
Dept: PHYSICAL THERAPY | Age: 80
Discharge: HOME | End: 2020-02-10
Attending: PSYCHIATRY & NEUROLOGY
Payer: MEDICARE

## 2020-02-11 ENCOUNTER — APPOINTMENT (OUTPATIENT)
Dept: PHYSICAL THERAPY | Age: 80
End: 2020-02-11
Attending: PSYCHIATRY & NEUROLOGY
Payer: MEDICARE

## 2020-02-12 ENCOUNTER — HOSPITAL ENCOUNTER (OUTPATIENT)
Dept: PHYSICAL THERAPY | Age: 80
Discharge: HOME | End: 2020-02-12
Attending: PSYCHIATRY & NEUROLOGY
Payer: MEDICARE

## 2020-02-17 ENCOUNTER — HOSPITAL ENCOUNTER (OUTPATIENT)
Dept: PHYSICAL THERAPY | Age: 80
Discharge: HOME | End: 2020-02-17
Attending: PSYCHIATRY & NEUROLOGY
Payer: MEDICARE

## 2020-02-17 PROCEDURE — 97112 NEUROMUSCULAR REEDUCATION: CPT

## 2020-02-17 PROCEDURE — 97110 THERAPEUTIC EXERCISES: CPT

## 2020-02-17 NOTE — PROGRESS NOTES
Colton George  : 1940  [place]  Secondary: Hernán Melendrez [address] at [place] [address] [place] [address], [place] [address].  [phone]   [phone]         OUTPATIENT PHYSICAL THERAPY: Daily Treatment Note 2020  Visit Count:  24    ICD-10: Treatment Diagnosis: Other abnormalities of gait and mobility R26.89  Precautions/Allergies:   Betadine [povidone-iodine]; Pcn [penicillins]; and Pentasa [mesalamine]   TREATMENT PLAN:  Effective Dates:  2020 to 2020  Frequency/Duration: 2 times a week for 30-45 more Day(s)    Pre-treatment Symptoms/Complaints: Tired. I've had bronchitis but feeling some better. Fatigue level today 10. HR 86 bpm, O2 98%   Pain: Initial: Pain Intensity 1: 0 0/10 Post Session:  0/10   Medications Last Reviewed:  2020  Updated Objective Findings:  None Today  TREATMENT:     THERAPEUTIC EXERCISE: (15 minutes):  Exercises per grid below to improve mobility, strength and balance. Required minimal verbal cues to promote proper body alignment, promote proper body posture and promote proper body mechanics. Progressed resistance, repetitions and complexity of movement as indicated. Date:  20   Activity/Exercise Parameters   Step ups 6 inch step x 15 reps each leg    Standing :  hip flexion, hip abduction, hip extension Not today   Sit to stand From chair table 2x10 reps   Nustep Not today   UBE     Scapular retraction Red band2 x 10 reps   B shoulder horizontal abduction Yellow band 2 x 10 reps         NEUROMUSCULAR RE-EDUCATION: (25 minutes):  Exercise/activities per grid below to improve balance, coordination, kinesthetic sense, posture and proprioception. Required minimal verbal cues to promote static and dynamic balance in standing.       Activity    Date  20   Activity/Exercise   Sets/reps/equipment   Walking with head turns        Walking with head up & down        Step overs     Over 1/2 foam roll x 20 reps  laterally with no UE assist   Step taps     6 inch step x 30 reps  Fwd and cross with no UE assist   Walking    In long hallway and up/down ramps, VCs for longer steps and for arm swing,  SBA   Sidestepping      Crossovers      [place]      Walking  backwards        Tandem walking      Weaving in/out of cones        Picking up cones        Sports cord        ONEOK ball      Figure 8s       Circles right/left      Walking with 360 degree turns      Spirals      Weight shifting:    Left & Right        Weight shifting: Forward & Backward         Static Standing Balance        Standing with feet apart     Blue foams eyes open and closed   Standing with feet together        Standing with feet semitandem   Blue foams eyes open and closed   Standing with feet tandem      Single leg stance      X1/X2 Viewing exercises        Hallpike-Mindy testing for BPPV (Benign Paroxysmal Positional Vertigo)        Cali-Daroff exercises      Canalith Repositioning treatment/Epley Maneuver  for BPPV (Benign Paroxysmal Positional Vertigo)      Smart Equitest Training: See scanned report. [employer] Portal  Treatment/Session Summary:    · Response to Treatment:   Patient tolerated session well. She needed several rest breaks due to fatigue, especially after having been sick. Patient is using her RW, and recommended she continue to use it for now. She had some recent falls at home while sick. Continue to progress as tolerated. · Communication/Consultation:  None today  · Equipment provided today:  None today  · Recommendations/Intent for next treatment session: Next visit will focus on therapeutic exercise for strengthening and activity tolerance, balance training.     Total Treatment Billable Duration:  40 minutes  PT Patient Time In/Time Out  Time In: 1025  Time Out: Ryan 1827, PT    Future Appointments   Date Time Provider Milla Betancur   2/19/2020 10:15 AM Harsh Au, PT SFEORPT SFE   2/21/2020 10:40 AM [facility] OUTREACH INSURANCE GCCOIG GV [address] [facility]   2/21/2020 11:15 AM Candace [place] MD [facility]   2/21/2020 11:30 AM NUR9 GCCOPIG GVL [facility]   2/24/2020 10:15 AM Harsh Au, PT SFEORPT SFE   2/26/2020 10:15 AM Harsh Au, PT SFEORPT SFE   3/18/2020  2:00 PM Juliette HALEY MD [facility]   4/21/2020 11:30 AM Angel Caldwell NP [facility] BEHAVIORAL HEALTH SERVICES [facility] [address] [facility]

## 2020-02-19 ENCOUNTER — HOSPITAL ENCOUNTER (OUTPATIENT)
Dept: PHYSICAL THERAPY | Age: 80
Discharge: HOME | End: 2020-02-19
Attending: PSYCHIATRY & NEUROLOGY
Payer: MEDICARE

## 2020-02-19 PROCEDURE — 97110 THERAPEUTIC EXERCISES: CPT

## 2020-02-19 PROCEDURE — 97112 NEUROMUSCULAR REEDUCATION: CPT

## 2020-02-19 NOTE — PROGRESS NOTES
Minh Molina  : 1940  [place]  Secondary: Hernán eMlendrez 13 at [address], [address] [place] [address],[address], [address] [place] [address]  [phone]   [phone]         OUTPATIENT PHYSICAL THERAPY: Daily Treatment Note 2020  Visit Count:  25    ICD-10: Treatment Diagnosis: Other abnormalities of gait and mobility R26.89  Precautions/Allergies:   Betadine [povidone-iodine]; Pcn [penicillins]; and Pentasa [mesalamine]   TREATMENT PLAN:  Effective Dates:  2020 to 2020  Frequency/Duration: 2 times a week for 30-45 more Day(s)    Pre-treatment Symptoms/Complaints: Doing okay, but have lost my voice from the bronchitis. Saw orthopedic MD who said I didn't have to wear that boot anymore; I could wear my own shoe. Xray was clear. Using RW. Fatigue level today 8/10. HR 80 bpm, O2 96%   Pain: Initial: Pain Intensity 1: 0 0/10 Post Session:  0/10   Medications Last Reviewed:  2020  Updated Objective Findings:  None Today  TREATMENT:     THERAPEUTIC EXERCISE: (20 minutes):  Exercises per grid below to improve mobility, strength and balance. Required minimal verbal cues to promote proper body alignment, promote proper body posture and promote proper body mechanics. Progressed resistance, repetitions and complexity of movement as indicated. Date:  20   Activity/Exercise Parameters   Step ups 6 inch step x 15 reps each leg    Standing :  hip flexion, hip abduction, hip extension Not today   Sit to stand From chair table 2x10 reps   Nustep Level 4 x 6 minutes. UBE     Scapular retraction Red band2 x 10 reps   B shoulder horizontal abduction Yellow band 2 x 10 reps         NEUROMUSCULAR RE-EDUCATION: (27 minutes):  Exercise/activities per grid below to improve balance, coordination, kinesthetic sense, posture and proprioception.   Required minimal verbal cues to promote static and dynamic balance in standing. Activity    Date  2/19/20   Activity/Exercise   Sets/reps/equipment   Walking with head turns        Walking with head up & down        Step overs     Over 1/2 foam roll x 20 reps  laterally with no UE assist   Step taps     6 inch step x 30 reps  Fwd and cross with no UE assist   Walking    In long hallway and up/down ramps, VCs for longer steps and for arm swing,  SBA   Sidestepping      Crossovers      [place]      Walking  backwards        Tandem walking      Weaving in/out of cones        Picking up cones        Sports cord        ONEOK ball      Figure 8s       Circles right/left      Walking with 360 degree turns      Spirals      Weight shifting:    Left & Right        Weight shifting: Forward & Backward         Static Standing Balance        Standing with feet apart     Blue foams eyes open and closed   Standing with feet together        Standing with feet semitandem   Blue foams eyes open and closed   Standing with feet tandem      Single leg stance      X1/X2 Viewing exercises        Hallpike-Mindy testing for BPPV (Benign Paroxysmal Positional Vertigo)        Cali-Daroff exercises      Canalith Repositioning treatment/Epley Maneuver  for BPPV (Benign Paroxysmal Positional Vertigo)      Smart Equitest Training: See scanned report. [employer] Portal  Treatment/Session Summary:    · Response to Treatment:   Patient tolerated session well. She needed several rest breaks due to fatigue, and due to recovering from bronchitis. Patient had no complaints of pain or discomfort. Continue to progress as tolerated. · Communication/Consultation:  None today  · Equipment provided today:  None today  · Recommendations/Intent for next treatment session: Next visit will focus on therapeutic exercise for strengthening and activity tolerance, balance training.     Total Treatment Billable Duration:  47 minutes  PT Patient Time In/Time Out  Time In: 1010  Time Out: Buck Márquez Neel Fernandez, PT    Future Appointments   Date Time Provider Milla Gypsy   2/21/2020 10:40 AM Rachelle 88 [facility]   2/21/2020 11:15 AM Liz Horton MD [facility]   2/21/2020 11:30 AM NUR9 GCCOPIG [facility]   2/24/2020 10:15 AM Ajay Campbell, PT SFEORPT SFE   2/26/2020 10:15 AM Ajay Campbell, PT SFEORPT SFE   3/18/2020  2:00 PM Ingrid HALEY MD [facility] BSNE   4/21/2020 11:30 AM Dayron Oconnor NP [facility] BEHAVIORAL HEALTH SERVICES [facility]

## 2020-02-21 ENCOUNTER — HOSPITAL ENCOUNTER (OUTPATIENT)
Dept: LAB | Age: 80
Discharge: HOME | End: 2020-02-21
Payer: MEDICARE

## 2020-02-21 ENCOUNTER — HOSPITAL ENCOUNTER (OUTPATIENT)
Dept: INFUSION THERAPY | Age: 80
Discharge: HOME | End: 2020-02-21
Payer: MEDICARE

## 2020-02-21 ENCOUNTER — PATIENT OUTREACH (OUTPATIENT)
Dept: CASE MANAGEMENT | Age: 80
End: 2020-02-21

## 2020-02-21 VITALS — HEIGHT: 66 IN | BODY MASS INDEX: 26.88 KG/M2

## 2020-02-21 DIAGNOSIS — C50.911 MALIGNANT NEOPLASM OF RIGHT BREAST IN FEMALE, ESTROGEN RECEPTOR POSITIVE, UNSPECIFIED SITE OF BREAST (HCC): Primary | ICD-10-CM

## 2020-02-21 DIAGNOSIS — Z17.0 MALIGNANT NEOPLASM OF RIGHT BREAST IN FEMALE, ESTROGEN RECEPTOR POSITIVE, UNSPECIFIED SITE OF BREAST (HCC): ICD-10-CM

## 2020-02-21 DIAGNOSIS — C50.911 MALIGNANT NEOPLASM OF RIGHT BREAST IN FEMALE, ESTROGEN RECEPTOR POSITIVE, UNSPECIFIED SITE OF BREAST (HCC): ICD-10-CM

## 2020-02-21 DIAGNOSIS — Z17.0 MALIGNANT NEOPLASM OF RIGHT BREAST IN FEMALE, ESTROGEN RECEPTOR POSITIVE, UNSPECIFIED SITE OF BREAST (HCC): Primary | ICD-10-CM

## 2020-02-21 LAB
ALBUMIN SERPL-MCNC: 3.2 G/DL (ref 3.2–4.6)
ALBUMIN/GLOB SERPL: 0.7 {RATIO} (ref 1.2–3.5)
ALP SERPL-CCNC: 115 U/L (ref 50–136)
ALT SERPL-CCNC: 14 U/L (ref 12–65)
ANION GAP SERPL CALC-SCNC: 5 MMOL/L (ref 7–16)
AST SERPL-CCNC: 21 U/L (ref 15–37)
BASOPHILS # BLD: 0 K/UL (ref 0–0.2)
BASOPHILS NFR BLD: 1 % (ref 0–2)
BILIRUB SERPL-MCNC: 0.3 MG/DL (ref 0.2–1.1)
BUN SERPL-MCNC: 13 MG/DL (ref 8–23)
CALCIUM SERPL-MCNC: 9 MG/DL (ref 8.3–10.4)
CHLORIDE SERPL-SCNC: 113 MMOL/L (ref 98–107)
CO2 SERPL-SCNC: 24 MMOL/L (ref 21–32)
CREAT SERPL-MCNC: 1.02 MG/DL (ref 0.6–1)
DIFFERENTIAL METHOD BLD: ABNORMAL
EOSINOPHIL # BLD: 0.1 K/UL (ref 0–0.8)
EOSINOPHIL NFR BLD: 1 % (ref 0.5–7.8)
ERYTHROCYTE [DISTWIDTH] IN BLOOD BY AUTOMATED COUNT: 16.7 % (ref 11.9–14.6)
GLOBULIN SER CALC-MCNC: 4.7 G/DL (ref 2.3–3.5)
GLUCOSE SERPL-MCNC: 146 MG/DL (ref 65–100)
HCT VFR BLD AUTO: 36.4 % (ref 35.8–46.3)
HGB BLD-MCNC: 10.9 G/DL (ref 11.7–15.4)
IMM GRANULOCYTES # BLD AUTO: 0 K/UL (ref 0–0.5)
IMM GRANULOCYTES NFR BLD AUTO: 1 % (ref 0–5)
LYMPHOCYTES # BLD: 1.2 K/UL (ref 0.5–4.6)
LYMPHOCYTES NFR BLD: 21 % (ref 13–44)
MCH RBC QN AUTO: 25.6 PG (ref 26.1–32.9)
MCHC RBC AUTO-ENTMCNC: 29.9 G/DL (ref 31.4–35)
MCV RBC AUTO: 85.4 FL (ref 79.6–97.8)
MONOCYTES # BLD: 0.6 K/UL (ref 0.1–1.3)
MONOCYTES NFR BLD: 10 % (ref 4–12)
NEUTS SEG # BLD: 3.8 K/UL (ref 1.7–8.2)
NEUTS SEG NFR BLD: 67 % (ref 43–78)
NRBC # BLD: 0 K/UL (ref 0–0.2)
PLATELET # BLD AUTO: 139 K/UL (ref 150–450)
PMV BLD AUTO: 10.3 FL (ref 9.4–12.3)
POTASSIUM SERPL-SCNC: 3.6 MMOL/L (ref 3.5–5.1)
PROT SERPL-MCNC: 7.9 G/DL (ref 6.3–8.2)
RBC # BLD AUTO: 4.26 M/UL (ref 4.05–5.25)
SODIUM SERPL-SCNC: 142 MMOL/L (ref 136–145)
WBC # BLD AUTO: 5.6 K/UL (ref 4.3–11.1)

## 2020-02-21 PROCEDURE — 96375 TX/PRO/DX INJ NEW DRUG ADDON: CPT

## 2020-02-21 PROCEDURE — 80053 COMPREHEN METABOLIC PANEL: CPT

## 2020-02-21 PROCEDURE — 96413 CHEMO IV INFUSION 1 HR: CPT

## 2020-02-21 PROCEDURE — 74011250636 HC RX REV CODE- 250/636: Performed by: INTERNAL MEDICINE

## 2020-02-21 PROCEDURE — 85025 COMPLETE CBC W/AUTO DIFF WBC: CPT

## 2020-02-21 RX ORDER — ONDANSETRON 2 MG/ML
8 INJECTION INTRAMUSCULAR; INTRAVENOUS ONCE
Status: COMPLETED | OUTPATIENT
Start: 2020-02-21 | End: 2020-02-21

## 2020-02-21 RX ORDER — SODIUM CHLORIDE 0.9 % (FLUSH) 0.9 %
10 SYRINGE (ML) INJECTION AS NEEDED
Status: DISCONTINUED | OUTPATIENT
Start: 2020-02-21 | End: 2020-02-22 | Stop reason: HOSPADM

## 2020-02-21 RX ORDER — SODIUM CHLORIDE 9 MG/ML
25 INJECTION, SOLUTION INTRAVENOUS CONTINUOUS
Status: DISCONTINUED | OUTPATIENT
Start: 2020-02-21 | End: 2020-02-22 | Stop reason: HOSPADM

## 2020-02-21 RX ADMIN — ADO-TRASTUZUMAB EMTANSINE 260 MG: 20 INJECTION, POWDER, LYOPHILIZED, FOR SOLUTION INTRAVENOUS at 13:44

## 2020-02-21 RX ADMIN — SODIUM CHLORIDE 25 ML/HR: 900 INJECTION, SOLUTION INTRAVENOUS at 13:10

## 2020-02-21 RX ADMIN — Medication 10 ML: at 14:47

## 2020-02-21 RX ADMIN — Medication 10 ML: at 13:10

## 2020-02-21 RX ADMIN — ONDANSETRON 8 MG: 2 INJECTION INTRAMUSCULAR; INTRAVENOUS at 13:12

## 2020-02-21 NOTE — PROGRESS NOTES
2/21/2020 Saw the patient with Dr Patrice Larsen. [facility] She will have echo in April. She is working with PT. She is struggling with no appetite and has tried marinol and CBD with not much noticeable difference. Will continue to follow.

## 2020-02-21 NOTE — PROGRESS NOTES
Arrived to the [facility]. Cedrick completed. Patient tolerated well. Any issues or concerns during appointment: Patient stated that she was \"maybe\" having some numbness and tingling in her hands and feet. Patient educated to inform her physician if this persists. Patient aware of next infusion appointment on 3/13/2020 (date) at 13:30 am (time). Discharged ambulatory via walker with family member.

## 2020-02-24 ENCOUNTER — HOSPITAL ENCOUNTER (OUTPATIENT)
Dept: PHYSICAL THERAPY | Age: 80
Discharge: HOME | End: 2020-02-24
Attending: PSYCHIATRY & NEUROLOGY
Payer: MEDICARE

## 2020-02-24 PROCEDURE — 97112 NEUROMUSCULAR REEDUCATION: CPT

## 2020-02-24 PROCEDURE — 97110 THERAPEUTIC EXERCISES: CPT

## 2020-02-24 NOTE — PROGRESS NOTES
Chata Saba  : 1940  [place]  Secondary: Hernán Parvin [address] at [place] [address], [place] [address].  [phone]   [phone]         OUTPATIENT PHYSICAL THERAPY: Daily Treatment Note 2020  Visit Count:  26    ICD-10: Treatment Diagnosis: Other abnormalities of gait and mobility R26.89  Precautions/Allergies:   Betadine [povidone-iodine]; Pcn [penicillins]; and Pentasa [mesalamine]   TREATMENT PLAN:  Effective Dates:  2020 to 2020  Frequency/Duration: 2 times a week for 30-45 more Day(s)    Pre-treatment Symptoms/Complaints: \"I'm feeling nervous today. \"  Using RW. Fatigue level today 8/10. HR 86 bpm, O2 96%   Pain: Initial: Pain Intensity 1: 0 0/10 Post Session:  0/10   Medications Last Reviewed:  2020  Updated Objective Findings:  None Today  TREATMENT:     THERAPEUTIC EXERCISE: (15 minutes):  Exercises per grid below to improve mobility, strength and balance. Required minimal verbal cues to promote proper body alignment, promote proper body posture and promote proper body mechanics. Progressed resistance, repetitions and complexity of movement as indicated. Date:  20   Activity/Exercise  Parameters   Step ups 6 inch step x 15 reps each leg    Standing :  hip flexion, hip abduction, hip extension X 10 reps each leg, each exercise   Sit to stand From chair table 2x10 reps   Nustep Level 4 x 6 minutes. UBE     Scapular retraction Red band2 x 10 reps   B shoulder horizontal abduction Yellow band 2 x 10 reps         NEUROMUSCULAR RE-EDUCATION: (25 minutes):  Exercise/activities per grid below to improve balance, coordination, kinesthetic sense, posture and proprioception. Required minimal verbal cues to promote static and dynamic balance in standing.       Activity    Date  20   Activity/Exercise   Sets/reps/equipment   Walking with head turns        Walking with head up & down        Step overs     Over 1/2 foam roll x 20 reps  laterally with no UE assist   Step taps     6 inch step x 30 reps  Fwd and cross with no UE assist   Walking    In long hallway and up/down ramps, VCs for longer steps and for arm swing,  SBA   Sidestepping      Crossovers      [place]      Walking  backwards        Tandem walking      Weaving in/out of cones        Picking up cones        Sports cord        ONEOK ball      Figure 8s       Circles right/left      Walking with 360 degree turns      Spirals      Weight shifting:    Left & Right        Weight shifting: Forward & Backward         Static Standing Balance        Standing with feet apart     Blue foams eyes open and closed   Standing with feet together        Standing with feet semitandem   Blue foams eyes open and closed   Standing with feet tandem      Single leg stance      X1/X2 Viewing exercises        Hallpike-Mindy testing for BPPV (Benign Paroxysmal Positional Vertigo)        [place]-Daroff exercises      Canalith Repositioning treatment/Epley Maneuver  for BPPV (Benign Paroxysmal Positional Vertigo)      Smart Equitest Training: See scanned report. [employer] Portal  Treatment/Session Summary:    · Response to Treatment:   Patient tolerated session well. She reported not feeling great today but was able to do all exercises with rest breaks, and no LOB. Continue to progress as tolerated. · Communication/Consultation:  None today  · Equipment provided today:  None today  · Recommendations/Intent for next treatment session: Next visit will focus on therapeutic exercise for strengthening and activity tolerance, balance training.     Total Treatment Billable Duration:  40 minutes  PT Patient Time In/Time Out  Time In: 1010  Time Out: [address] [facility]    Future Appointments   Date Time Provider Milla Betancur   2/26/2020 10:15 AM Cuate Rogers PT [facility] CAITIEE   3/13/2020 10:30 AM Keaton Reno GV [address]08 [facility]   3/18/2020  2:00 PM Ruddy Arenas MD BSNE BSNE   3/31/2020  1:30 PM ECHO 52 [facility] UCDG UCD   4/3/2020  1:10 PM 1808 [facility] OUTREACH INSURANCE GCCOIG GV 1808 [facility]   4/3/2020  1:30 PM Jennifer Ge MD [facility]   4/3/2020  2:00 PM Lova Wellington [facility] [address] [facility]   4/21/2020 11:30 AM Airam Tan NP [facility] BEHAVIORAL HEALTH SERVICES GV [address] [facility]   4/24/2020 11:30 AM Lova Wellington [facility] [address] [facility]   5/15/2020 12:30 PM 1808 [facility] OUTREACH INSURANCE GCCOIG GV [address] [facility]   5/15/2020  1:00 PM Jennifer Ge MD [facility]   5/15/2020  1:30 PM Lova Wellington [facility] [address] [facility]

## 2020-02-26 ENCOUNTER — HOSPITAL ENCOUNTER (OUTPATIENT)
Dept: PHYSICAL THERAPY | Age: 80
Discharge: HOME | End: 2020-02-26
Attending: PSYCHIATRY & NEUROLOGY
Payer: MEDICARE

## 2020-02-26 PROCEDURE — 97112 NEUROMUSCULAR REEDUCATION: CPT

## 2020-02-26 PROCEDURE — 97110 THERAPEUTIC EXERCISES: CPT

## 2020-02-26 NOTE — PROGRESS NOTES
Francie Hightower  : 1940  [place]  Secondary: Hernán Melendrez 13 at [facility] [address] [place] [address], [address] [facility]  [phone]   [phone]         OUTPATIENT PHYSICAL THERAPY: Daily Treatment Note 2020  Visit Count:  27    ICD-10: Treatment Diagnosis: Other abnormalities of gait and mobility R26.89  Precautions/Allergies:   Betadine [povidone-iodine]; Pcn [penicillins]; and Pentasa [mesalamine]   TREATMENT PLAN:  Effective Dates:  2020 to 2020  Frequency/Duration: 2 times a week for 30-45 more Day(s)    Pre-treatment Symptoms/Complaints: \"I'm about the same. Using RW. Fatigue level today 8/10. HR 99 bpm, O2 97%  Pain: Initial: Pain Intensity 1: 0 0/10 Post Session:  0/10   Medications Last Reviewed:  2020  Updated Objective Findings:  None Today  TREATMENT:     THERAPEUTIC EXERCISE: (20 minutes):  Exercises per grid below to improve mobility, strength and balance. Required minimal verbal cues to promote proper body alignment, promote proper body posture and promote proper body mechanics. Progressed resistance, repetitions and complexity of movement as indicated. Date:  20   Activity/Exercise  Parameters   Step ups 6 inch step x 15 reps each leg    Standing :  hip flexion, hip abduction, hip extension, heel and toe raises X 10 reps each leg, each exercise   Sit to stand From chair table 2x10 reps   Nustep Level 4 x 6 minutes. UBE     Scapular retraction Red band2 x 10 reps   B shoulder horizontal abduction Yellow band 2 x 10 reps         NEUROMUSCULAR RE-EDUCATION: (25 minutes):  Exercise/activities per grid below to improve balance, coordination, kinesthetic sense, posture and proprioception. Required minimal verbal cues to promote static and dynamic balance in standing.       Activity    Date  20   Activity/Exercise   Sets/reps/equipment   Walking with head turns Walking with head up & down        Step overs     Over 1/2 foam roll x 20 reps  laterally with no UE assist   Step taps     6 inch step x 30 reps  Fwd and cross with no UE assist   Walking    In long hallway and up/down ramps, VCs for longer steps and for arm swing,  SBA   Sidestepping      Crossovers      [place]      Walking  backwards        Tandem walking      Weaving in/out of cones        Picking up cones        Sports cord        ONEOK ball      Figure 8s       Circles right/left      Walking with 360 degree turns      Spirals      Weight shifting:    Left & Right        Weight shifting: Forward & Backward         Static Standing Balance        Standing with feet apart     Blue foams eyes open and closed   Standing with feet together        Standing with feet semitandem   Blue foams eyes open and closed   Standing with feet tandem      Single leg stance      X1/X2 Viewing exercises        Hallpike-[place] testing for BPPV (Benign Paroxysmal Positional Vertigo)        Cali-Daroff exercises      Canalith Repositioning treatment/Epley Maneuver  for BPPV (Benign Paroxysmal Positional Vertigo)      Smart Equitest Training: See scanned report. [employer] Portal  Treatment/Session Summary:    · Response to Treatment:   Patient tolerated session well. We had planned to discharge at the end of February (today), but patient's niece called and scheduled visits through March. Discussed with patient about Medicare cap and the need to save money for the rest of the year. Patient has HEP to work on at home and has registered with the [facility]. Therefore, she can participate in Silver Sneakers classes as appropriate.  Patient is to talk with niece and she will call to cancel appts or discuss with PT.  · Communication/Consultation:  None today  · Equipment provided today:  None today  · Recommendations/Intent for next treatment session: Next visit will focus on therapeutic exercise for strengthening and activity tolerance, balance training.     Total Treatment Billable Duration:  45 minutes  PT Patient Time In/Time Out  Time In: 5296  Time Out: 2804 Chetan Peñaloza, PT    Future Appointments   Date Time Provider Milla Gypsy   3/2/2020 10:15 AM Hamp Sania, PT SFEORPT SFE   3/5/2020 10:15 AM Hamp Sania, PT SFEORPT SFE   3/9/2020 10:15 AM Hamp Sania, PT SFEORPT SFE   3/11/2020 10:15 AM Hamp Sania, PT SFEORPT SFE   3/13/2020 10:30 AM POD3B GCCOPIG GVL GCC   3/16/2020 10:15 AM Hamp Sania, PT SFEORPT SFE   3/18/2020  1:00 PM Hamp Sania, PT SFEORPT SFE   3/18/2020  2:00 PM Claudia HALEY MD BSNE BSNE   3/23/2020 10:15 AM Hamp Sania, PT SFEORPT SFE   3/25/2020 10:15 AM Hamp Sania, PT SFEORPT SFE   3/30/2020 10:15 AM Hamp Sania, PT SFEORPT SFE   3/31/2020  1:30 PM ECHO 52 [facility] UCDG UCD   4/1/2020 10:15 AM Hamp Sania, PT SFEORPT SFE   4/3/2020  1:10 PM [address] [place] [address] OUTREACH INSURANCE GCCOIG GVL [address] [place] [address]   4/3/2020  1:30 PM Whitney Topete MD [facility]   4/3/2020  2:00 PM POD2C GCCOPIG GVL [address] [place] [address]   4/21/2020 11:30 AM Mikhail Webb, NP GCCROG GVL [address] [place] [address]   4/24/2020 11:30 AM POD1A GCCOPIG GVL [facility]   5/15/2020 12:30 PM [address] [place] [address] OUTREACH INSURANCE GCCOIG GVL [address] [place] [address]   5/15/2020  1:00 PM Whitney Topete MD [facility]   5/15/2020  1:30 PM POD3C GCCOPIG GVL [facility]

## 2020-03-02 ENCOUNTER — HOSPITAL ENCOUNTER (OUTPATIENT)
Dept: PHYSICAL THERAPY | Age: 80
Discharge: HOME | End: 2020-03-02
Attending: PSYCHIATRY & NEUROLOGY

## 2020-03-02 NOTE — THERAPY DISCHARGE
Jd Griffith  : 1940  [place]  Secondary: Hernán Melendrez 13 at [address] [facility], [address] [place] [address], [address] [facility]  [phone]   [phone]           OUTPATIENT PHYSICAL THERAPY:Discontinuation Summary 3/2/2020   ICD-10: Treatment Diagnosis: Other abnormalities of gait and mobility R26.89  Precautions/Allergies:   Betadine [povidone-iodine]; Pcn [penicillins]; and Pentasa [mesalamine]   TREATMENT PLAN:  Effective Dates:  2020 to 2020  Frequency/Duration: 2 times a week for 30-45 more Day(s) MEDICAL/REFERRING DIAGNOSIS:  Parkinson's disease [G20]   DATE OF ONSET: about 3 years PD, about a year CA. REFERRING PHYSICIAN: Layla Branch,*  MD Orders: PT for Parkinson's  Return MD Appointment:      INITIAL ASSESSMENT:  Ms. Mike Parikh presents with weakness, decreased activity tolerance, imbalance, and unsteady gait. Patient is at risk for falls, and has a history of falls. Patient would benefit from PT to address these problems to improve patient's independence and safety with mobility and daily activities. Thank you. PROGRESS NOTE 12/10/19: Ms. Mike Parikh has attended 9 PT sessions from 19 to 12/10/19 for weakness, decreased activity tolerance, imbalance, and unsteady gait. Patient is demonstrating improving balance and activity tolerance. Patient's score on the Pride Balance Scale has improved from 42/56 to 52/56. Her score on the Five Times Sit to Stand Test has improved from 16.56 seconds to 16 seconds. Her score on the six minute walk test has improved from 600 feet with a rolling walker to 975 feet with no assistive device. Patient is progressing well toward goals. Patient verbalizes feeling stronger and having better activity tolerance. Patient would benefit from continuing PT to address these problems to improve patient's independence and safety with mobility and daily activities.  Thank you.    RECERTIFICATION NOTE 4/58/28: Ms. Makenna Joya has attended 18 PT sessions from 11/8/19 to 1/20/20 for weakness, decreased activity tolerance, imbalance, and unsteady gait. Patient is demonstrating improving balance and activity tolerance. Patient's score on the Pride Balance Scale has improved from 42/56 to 53/56. Her score on the Five Times Sit to Stand Test has improved from 16.56 seconds to 16 seconds. Her score on the six minute walk test has improved from 600 feet with a rolling walker to 900 feet with no assistive device. With verbal cues to swing arms during gait, she is able to achieve better step length and heel strike. Patient is progressing well toward goals. Patient requested to continue PT through February to further improve her mobility and balance to be able to go back home. Patient would benefit from continuing PT to address these problems to improve patient's independence and safety with mobility and daily activities. Thank you    DISCONTINUATION SUMMARY 3/2/20: Ms. Makenna Joya attended 27 PT sessions from 11/8/19 to 2/26/20 for weakness, decreased activity tolerance, imbalance, and unsteady gait. Patient has demonstrated improved balance and activity tolerance but continues to mathew fatigue. She is independent and compliant with HEP, with the help and encouragement of her niece whom she lives with at this time. Discussed with patient and niece that we would discharge patient at this time as patient is able to continue with exercises on her own, and to save her Medicare therapy money for later in the year in case she needs more therapy. Patient and niece were in agreement with this plan. Patient is to continue to use her RW at this time due to fatigue level compromising balance and safety at times. Discharge today. PROBLEM LIST (Impacting functional limitations):  1. Decreased Strength  2. Decreased ADL/Functional Activities  3. Decreased Transfer Abilities  4.  Decreased Ambulation Ability/Technique  5. Decreased Balance  6. Decreased Activity Tolerance  7. Decreased Flexibility/Joint Mobility  8. Decreased Cognition INTERVENTIONS PLANNED: (Treatment may consist of any combination of the following)  1. Balance Exercise  2. Gait Training  3. Home Exercise Program (HEP)  4. Neuromuscular Re-education/Strengthening  5. Therapeutic Activites  6. Therapeutic Exercise/Strengthening  7. Transfer Training   8. GOALS: (Goals have been discussed and agreed upon with patient.)  Short-Term Functional Goals: Time Frame: 2-4 weeks  1. Patient will demonstrate independence and compliance with home exercise program to improve balance and strength for daily activities. MET  2. Patient will increase her score on the Pride Balance Scale to greater than or equal to 44/56 indicating improved safety and decreased fall risk for daily activities. met  Discharge Goals: Time Frame: 8-12 weeks  1. Patient will increase her score on the Pride Balance Scale to greater than or equal to 48/56 indicating improved safety and decreased fall risk for daily activities. MET  Patient will ambulate with least assistive device over level and unlevel surfaces without evidence of imbalance to improve safety for daily activities. Met with RW  Patient will demonstrate improved time on Five Time Sit to Stand Test to less than or equal to 14 seconds indicating improved LE strength for daily mobility. Progressed but not met  Patient will demonstrate improved score on six minute walk test to 800 feet with stable vital signs indicating improved endurance and mobility for daily activities. MET    OUTCOME MEASURE:   Tool Used: Pride Balance Scale  Score:  Initial: 42/56 Most Recent: 53/56 (Date:2/26/20 )   Interpretation of Score: Each section is scored on a 0-4 scale, 0 representing the patients inability to perform the task and 4 representing independence. The scores of each section are added together for a total score of 56.   The higher the patients score, the more independent the patient is. Any score below 45 indicates increased risk for falls. Tool Used: Five Times Sit to Stand (FTSST or 5xSST)   Score:  Initial: 16.56 seconds Most Recent: 16 seconds (Date:2/26/20 )   Interpretation of Score: This is a measure of functional lower limb muscle strength and quantifying functional change of transitional movements. A score of 12 seconds or less indicates a normal level of function for community dwelling older individuals, a score of 15 seconds or more is at risk for fall. Tool Used: 6-MINUTE WALK TEST  Score:  Initial: 600 feet with RW Most Recent:900 feet with no AD (Date: 2/26/20 )   Interpretation of Score: Normal range varies but is approximately 5476-0544 Feet      Distance walked: 900 feet with No AD     Baseline End of Test   Heart Rate 77 84   Dyspnea (Luana Scale)     Fatigue (Luaan Scale) 6 6   SpO2 95% 96%   BP       Score 2133 [id number] [id number] [phone] 426-16 15-0     MEDICAL NECESSITY:   · Patient is expected to demonstrate progress in strength, balance, functional technique and mobility to increase independence with daily activities. REASON FOR SERVICES/OTHER COMMENTS:  · Patient continues to demonstrate capacity to improve strength, gait , balance which will increase independence and increase safety. Total Duration:       Rehabilitation Potential For Stated Goals: Good  Regarding Chana Edge's therapy, I certify that the treatment plan above will be carried out by a therapist or under their direction. Thank you for this referral,  Barb Chowdhury, PT     Referring Physician Signature: Bhupinder Patel,* No Signature is Required for this note. PAIN/SUBJECTIVE:   Initial:   0 Post Session:  0/10   HISTORY:   History of Injury/Illness (Reason for Referral):  Done with the bulk of chemo and radiation. Falls a lot. No pain. Twisted L ankle the other day with fall, no xray. Dizzy spells.  BP has been okay. Can get dizzy sitting, turning around makes her dizzy. Happens about daily. Lasts seconds. Using walker in the house now. Has changed to dressing in sitting. 6/10 fatigue level today. Not doing any exercise at home. No AD before treatment. Past Medical History/Comorbidities:   Ms. Makenna Joya  has a past medical history of Anxiety, Cancer ([facility] Cancer of right breast ([facility] [address] (Dx 04/2019), Crohn's disease ([facility] [address] Hypertension, Psychiatric disorder, and Thyroid disease. Ms. Makenna Joya  has a past surgical history that includes hx hysterectomy; hx lumbar laminectomy; and ir insert tunl cvc w port over 5 years (4/18/2019). Social History/Living Environment:     living with niece right now, a few steps to enter; has own house also, steps to basement but can avoid that. Prior Level of Function/Work/Activity:  Independent with RW  Dominant Side:         RIGHT  Previous Treatment Approaches: Oncology rehab with Dayanara Zabala   Personal Factors:          Sex:  female        Age:  [de-identified] y.o. Ambulatory/Rehab Services H2 Model Falls Risk Assessment   Risk Factors:       (1)  Dizziness/Vertigo       (5)  History of Recent Falls [w/in 3 months] Ability to Rise from Chair:       (0)  Ability to rise in a single movement   Falls Prevention Plan: Mobility Assistance Device (specify):  RW       Exercise/Equipment Adaptation (specify):  close supervision, rest breaks   Total: (5 or greater = High Risk): 6   ©2010 [facility] [address] [place] [address] States Patent #[id number]. Federal Law prohibits the replication, distribution or use without written permission from [facility]   Current Medications:       Current Outpatient Medications:     anastrozole (ARIMIDEX) 1 mg tablet, Take 1 mg by mouth daily. Indications: Hormone Receptor Positive Breast Cancer, Disp: 30 Tab, Rfl: 0    anastrozole (ARIMIDEX) 1 mg tablet, Take 1 mg by mouth daily. , Disp: 90 Tab, Rfl: 3    DISABLED PLACARD (DISABLED PLACARD) DMV, Handicap placard, Disp: 1 Each, Rfl: 0    desvenlafaxine succinate (PRISTIQ) 50 mg ER tablet, Take 50 mg by mouth daily. , Disp: , Rfl: 0    carbidopa-levodopa (SINEMET)  mg per tablet, Take 1 Tab by mouth three (3) times daily. , Disp: 270 Tab, Rfl: 3    potassium chloride (KLOR-CON) 10 mEq tablet, Take 2 Tabs by mouth two (2) times a day. (Patient taking differently: Take 10 mEq by mouth daily.), Disp: 30 Tab, Rfl: 3    LORazepam (ATIVAN) 1 mg tablet, Take 1.5 mg by mouth three (3) times daily. , Disp: , Rfl:     lidocaine-prilocaine (EMLA) topical cream, Apply  to affected area as needed for Pain. Apply to port site 45-60 minutes before lab appointment, Disp: 30 g, Rfl: 2    amLODIPine (NORVASC) 10 mg tablet, Take  by mouth daily. , Disp: , Rfl:     hydrALAZINE (APRESOLINE) 25 mg tablet, Take 25 mg by mouth four (4) times daily. , Disp: , Rfl:     losartan (COZAAR) 50 mg tablet, Take  by mouth daily. , Disp: , Rfl:     hydroCHLOROthiazide (HYDRODIURIL) 25 mg tablet, Take 25 mg by mouth daily. , Disp: , Rfl:     metoprolol tartrate (LOPRESSOR) 25 mg tablet, Take  by mouth two (2) times a day., Disp: , Rfl:     levothyroxine (SYNTHROID) 75 mcg tablet, Take  by mouth Daily (before breakfast). , Disp: , Rfl:     esomeprazole (NEXIUM) 40 mg capsule, Take  by mouth daily. , Disp: , Rfl:     oxybutynin chloride XL (DITROPAN XL) 10 mg CR tablet, Take 10 mg by mouth daily. , Disp: , Rfl:     melatonin 3 mg tablet, Take 6 mg by mouth nightly., Disp: , Rfl:     OLANZapine (ZYPREXA) 5 mg tablet, Take 5 mg by mouth two (2) times a day., Disp: , Rfl:    Date Last Reviewed:  1/20/20   Number of Personal Factors/Comorbidities that affect the Plan of Care: 1-2: MODERATE COMPLEXITY   EXAMINATION:   Observation/Orthostatic Postural Assessment:          WNL  Strength:          4-/5 B LE  Functional Mobility:         Gait/Ambulation:  Patient ambulates at a slow pace with decreased step length and heel strike, shuffling gait. Uses RW, but did not bring AD today since she walked in with her niece        Transfers:  independent        Bed Mobility:  independent        Stairs:  NT  Sensation:         intact  Postural Control & Balance:  · Pride Balance Scale:  42/56.   (A score less than 45/56 indicates high risk of falls)     · Dynamic Gait Index:  NT/24.   (A score less than or equal to19/24 is abnormal and predictive of falls)       Coordination:          WNL    Additional Special Tests:     5x sit to stand: 16.56 seconds   6 minute walk test: 600 feet  o Distance:  600 feet  o Assistive Device:  RW  o Orthosis/Brace:  none  o Amount of Assist:  SBA  o Gait Characteristics:  Shuffling/decreased step length          Body Structures Involved:  1. Nerves  2. Muscles Body Functions Affected:  1. Cardio  2. Neuromusculoskeletal  3. Movement Related Activities and Participation Affected:  1. General Tasks and Demands  2. Mobility  3. Domestic Life  4.  Community, Social and [place]   Number of elements (examined above) that affect the Plan of Care: 3: MODERATE COMPLEXITY   CLINICAL PRESENTATION:   Presentation: Evolving clinical presentation with changing clinical characteristics: MODERATE COMPLEXITY   CLINICAL DECISION MAKING:   Use of outcome tool(s) and clinical judgement create a POC that gives a: Questionable prediction of patient's progress: MODERATE COMPLEXITY

## 2020-03-05 ENCOUNTER — APPOINTMENT (OUTPATIENT)
Dept: PHYSICAL THERAPY | Age: 80
End: 2020-03-05
Attending: PSYCHIATRY & NEUROLOGY

## 2020-03-09 ENCOUNTER — APPOINTMENT (OUTPATIENT)
Dept: PHYSICAL THERAPY | Age: 80
End: 2020-03-09
Attending: PSYCHIATRY & NEUROLOGY

## 2020-03-11 ENCOUNTER — APPOINTMENT (OUTPATIENT)
Dept: PHYSICAL THERAPY | Age: 80
End: 2020-03-11
Attending: PSYCHIATRY & NEUROLOGY

## 2020-03-13 ENCOUNTER — HOSPITAL ENCOUNTER (OUTPATIENT)
Dept: INFUSION THERAPY | Age: 80
Discharge: HOME | End: 2020-03-13
Payer: MEDICARE

## 2020-03-13 VITALS
WEIGHT: 163.2 LBS | RESPIRATION RATE: 18 BRPM | DIASTOLIC BLOOD PRESSURE: 49 MMHG | TEMPERATURE: 98.2 F | HEART RATE: 75 BPM | BODY MASS INDEX: 26.74 KG/M2 | SYSTOLIC BLOOD PRESSURE: 132 MMHG | OXYGEN SATURATION: 95 %

## 2020-03-13 DIAGNOSIS — C50.911 MALIGNANT NEOPLASM OF RIGHT BREAST IN FEMALE, ESTROGEN RECEPTOR POSITIVE, UNSPECIFIED SITE OF BREAST (HCC): ICD-10-CM

## 2020-03-13 DIAGNOSIS — Z17.0 MALIGNANT NEOPLASM OF RIGHT BREAST IN FEMALE, ESTROGEN RECEPTOR POSITIVE, UNSPECIFIED SITE OF BREAST (HCC): ICD-10-CM

## 2020-03-13 DIAGNOSIS — E86.0 DEHYDRATION: ICD-10-CM

## 2020-03-13 DIAGNOSIS — R11.0 NAUSEA: ICD-10-CM

## 2020-03-13 DIAGNOSIS — M85.88 OSTEOPENIA OF SPINE: Primary | ICD-10-CM

## 2020-03-13 LAB
ALBUMIN SERPL-MCNC: 3.3 G/DL (ref 3.2–4.6)
ALBUMIN/GLOB SERPL: 0.8 {RATIO} (ref 1.2–3.5)
ALP SERPL-CCNC: 135 U/L (ref 50–136)
ALT SERPL-CCNC: 13 U/L (ref 12–65)
ANION GAP SERPL CALC-SCNC: 7 MMOL/L (ref 7–16)
AST SERPL-CCNC: 23 U/L (ref 15–37)
BASOPHILS # BLD: 0 K/UL (ref 0–0.2)
BASOPHILS NFR BLD: 1 % (ref 0–2)
BILIRUB SERPL-MCNC: 0.5 MG/DL (ref 0.2–1.1)
BUN SERPL-MCNC: 10 MG/DL (ref 8–23)
CALCIUM SERPL-MCNC: 9.3 MG/DL (ref 8.3–10.4)
CHLORIDE SERPL-SCNC: 115 MMOL/L (ref 98–107)
CO2 SERPL-SCNC: 22 MMOL/L (ref 21–32)
CREAT SERPL-MCNC: 0.84 MG/DL (ref 0.6–1)
DIFFERENTIAL METHOD BLD: ABNORMAL
EOSINOPHIL # BLD: 0 K/UL (ref 0–0.8)
EOSINOPHIL NFR BLD: 1 % (ref 0.5–7.8)
ERYTHROCYTE [DISTWIDTH] IN BLOOD BY AUTOMATED COUNT: 17.2 % (ref 11.9–14.6)
GLOBULIN SER CALC-MCNC: 4.3 G/DL (ref 2.3–3.5)
GLUCOSE SERPL-MCNC: 106 MG/DL (ref 65–100)
HCT VFR BLD AUTO: 33.3 % (ref 35.8–46.3)
HGB BLD-MCNC: 10 G/DL (ref 11.7–15.4)
IMM GRANULOCYTES # BLD AUTO: 0 K/UL (ref 0–0.5)
IMM GRANULOCYTES NFR BLD AUTO: 0 % (ref 0–5)
LYMPHOCYTES # BLD: 1 K/UL (ref 0.5–4.6)
LYMPHOCYTES NFR BLD: 27 % (ref 13–44)
MCH RBC QN AUTO: 25 PG (ref 26.1–32.9)
MCHC RBC AUTO-ENTMCNC: 30 G/DL (ref 31.4–35)
MCV RBC AUTO: 83.3 FL (ref 79.6–97.8)
MONOCYTES # BLD: 0.4 K/UL (ref 0.1–1.3)
MONOCYTES NFR BLD: 10 % (ref 4–12)
NEUTS SEG # BLD: 2.2 K/UL (ref 1.7–8.2)
NEUTS SEG NFR BLD: 61 % (ref 43–78)
NRBC # BLD: 0 K/UL (ref 0–0.2)
PLATELET # BLD AUTO: 78 K/UL (ref 150–450)
PMV BLD AUTO: 11 FL (ref 9.4–12.3)
POTASSIUM SERPL-SCNC: 3.6 MMOL/L (ref 3.5–5.1)
PROT SERPL-MCNC: 7.6 G/DL (ref 6.3–8.2)
RBC # BLD AUTO: 4 M/UL (ref 4.05–5.25)
SODIUM SERPL-SCNC: 144 MMOL/L (ref 136–145)
WBC # BLD AUTO: 3.6 K/UL (ref 4.3–11.1)

## 2020-03-13 PROCEDURE — 96375 TX/PRO/DX INJ NEW DRUG ADDON: CPT

## 2020-03-13 PROCEDURE — 96413 CHEMO IV INFUSION 1 HR: CPT

## 2020-03-13 PROCEDURE — 85025 COMPLETE CBC W/AUTO DIFF WBC: CPT

## 2020-03-13 PROCEDURE — 74011250636 HC RX REV CODE- 250/636: Performed by: INTERNAL MEDICINE

## 2020-03-13 PROCEDURE — 36593 DECLOT VASCULAR DEVICE: CPT

## 2020-03-13 PROCEDURE — 80053 COMPREHEN METABOLIC PANEL: CPT

## 2020-03-13 PROCEDURE — 74011000250 HC RX REV CODE- 250: Performed by: INTERNAL MEDICINE

## 2020-03-13 RX ORDER — SODIUM CHLORIDE 0.9 % (FLUSH) 0.9 %
10 SYRINGE (ML) INJECTION AS NEEDED
Status: ACTIVE | OUTPATIENT
Start: 2020-03-13 | End: 2020-03-13

## 2020-03-13 RX ORDER — SODIUM CHLORIDE 9 MG/ML
25 INJECTION, SOLUTION INTRAVENOUS CONTINUOUS
Status: ACTIVE | OUTPATIENT
Start: 2020-03-13 | End: 2020-03-13

## 2020-03-13 RX ORDER — ONDANSETRON 2 MG/ML
8 INJECTION INTRAMUSCULAR; INTRAVENOUS ONCE
Status: COMPLETED | OUTPATIENT
Start: 2020-03-13 | End: 2020-03-13

## 2020-03-13 RX ADMIN — SODIUM CHLORIDE 100 ML/HR: 900 INJECTION, SOLUTION INTRAVENOUS at 11:00

## 2020-03-13 RX ADMIN — Medication 10 ML: at 12:50

## 2020-03-13 RX ADMIN — ADO-TRASTUZUMAB EMTANSINE 260 MG: 20 INJECTION, POWDER, LYOPHILIZED, FOR SOLUTION INTRAVENOUS at 12:00

## 2020-03-13 RX ADMIN — ALTEPLASE 2 MG: 2.2 INJECTION, POWDER, LYOPHILIZED, FOR SOLUTION INTRAVENOUS at 10:35

## 2020-03-13 RX ADMIN — ONDANSETRON 8 MG: 2 INJECTION INTRAMUSCULAR; INTRAVENOUS at 11:41

## 2020-03-13 RX ADMIN — Medication 10 ML: at 11:00

## 2020-03-13 NOTE — PROGRESS NOTES
Pt arrived ambulatory today at 0940, to receive IV chemotherapy. Labs drawn from Left AC, while port cath keven'd per protocol. Pt tolerated without difficulty. Patient discharged via ambulatory accompanied by saroj. Instructed to notify physician of any problems, questions or concerns. Allowed opportunity for patient/family to ask questions. Verbalized understanding. Next appointment is April 3 at 1400 with Riaz Connor.

## 2020-03-16 ENCOUNTER — APPOINTMENT (OUTPATIENT)
Dept: PHYSICAL THERAPY | Age: 80
End: 2020-03-16
Attending: PSYCHIATRY & NEUROLOGY

## 2020-03-18 ENCOUNTER — APPOINTMENT (OUTPATIENT)
Dept: PHYSICAL THERAPY | Age: 80
End: 2020-03-18
Attending: PSYCHIATRY & NEUROLOGY

## 2020-03-23 ENCOUNTER — APPOINTMENT (OUTPATIENT)
Dept: PHYSICAL THERAPY | Age: 80
End: 2020-03-23
Attending: PSYCHIATRY & NEUROLOGY

## 2020-03-25 ENCOUNTER — APPOINTMENT (OUTPATIENT)
Dept: PHYSICAL THERAPY | Age: 80
End: 2020-03-25
Attending: PSYCHIATRY & NEUROLOGY

## 2020-03-30 ENCOUNTER — APPOINTMENT (OUTPATIENT)
Dept: PHYSICAL THERAPY | Age: 80
End: 2020-03-30
Attending: PSYCHIATRY & NEUROLOGY

## 2020-04-01 ENCOUNTER — APPOINTMENT (OUTPATIENT)
Dept: PHYSICAL THERAPY | Age: 80
End: 2020-04-01
Attending: PSYCHIATRY & NEUROLOGY

## 2020-04-03 ENCOUNTER — HOSPITAL ENCOUNTER (OUTPATIENT)
Dept: LAB | Age: 80
Discharge: HOME | End: 2020-04-03
Payer: MEDICARE

## 2020-04-03 ENCOUNTER — HOSPITAL ENCOUNTER (OUTPATIENT)
Dept: INFUSION THERAPY | Age: 80
Discharge: HOME | End: 2020-04-03
Payer: MEDICARE

## 2020-04-03 VITALS — WEIGHT: 163.7 LBS | BODY MASS INDEX: 26.83 KG/M2

## 2020-04-03 DIAGNOSIS — C50.911 MALIGNANT NEOPLASM OF RIGHT BREAST IN FEMALE, ESTROGEN RECEPTOR POSITIVE, UNSPECIFIED SITE OF BREAST (HCC): Primary | ICD-10-CM

## 2020-04-03 DIAGNOSIS — C50.911 MALIGNANT NEOPLASM OF RIGHT BREAST IN FEMALE, ESTROGEN RECEPTOR POSITIVE, UNSPECIFIED SITE OF BREAST (HCC): ICD-10-CM

## 2020-04-03 DIAGNOSIS — Z17.0 MALIGNANT NEOPLASM OF RIGHT BREAST IN FEMALE, ESTROGEN RECEPTOR POSITIVE, UNSPECIFIED SITE OF BREAST (HCC): Primary | ICD-10-CM

## 2020-04-03 DIAGNOSIS — Z17.0 MALIGNANT NEOPLASM OF RIGHT BREAST IN FEMALE, ESTROGEN RECEPTOR POSITIVE, UNSPECIFIED SITE OF BREAST (HCC): ICD-10-CM

## 2020-04-03 LAB
ALBUMIN SERPL-MCNC: 3 G/DL (ref 3.2–4.6)
ALBUMIN/GLOB SERPL: 0.7 {RATIO} (ref 1.2–3.5)
ALP SERPL-CCNC: 121 U/L (ref 50–136)
ALT SERPL-CCNC: 12 U/L (ref 12–65)
ANION GAP SERPL CALC-SCNC: 6 MMOL/L (ref 7–16)
AST SERPL-CCNC: 23 U/L (ref 15–37)
BASOPHILS # BLD: 0 K/UL (ref 0–0.2)
BASOPHILS NFR BLD: 1 % (ref 0–2)
BILIRUB SERPL-MCNC: 0.5 MG/DL (ref 0.2–1.1)
BUN SERPL-MCNC: 11 MG/DL (ref 8–23)
CALCIUM SERPL-MCNC: 8.9 MG/DL (ref 8.3–10.4)
CHLORIDE SERPL-SCNC: 111 MMOL/L (ref 98–107)
CO2 SERPL-SCNC: 23 MMOL/L (ref 21–32)
CREAT SERPL-MCNC: 0.83 MG/DL (ref 0.6–1)
DIFFERENTIAL METHOD BLD: ABNORMAL
EOSINOPHIL # BLD: 0 K/UL (ref 0–0.8)
EOSINOPHIL NFR BLD: 1 % (ref 0.5–7.8)
ERYTHROCYTE [DISTWIDTH] IN BLOOD BY AUTOMATED COUNT: 18 % (ref 11.9–14.6)
GLOBULIN SER CALC-MCNC: 4.3 G/DL (ref 2.3–3.5)
GLUCOSE SERPL-MCNC: 118 MG/DL (ref 65–100)
HCT VFR BLD AUTO: 32.1 % (ref 35.8–46.3)
HGB BLD-MCNC: 9.7 G/DL (ref 11.7–15.4)
IMM GRANULOCYTES # BLD AUTO: 0 K/UL (ref 0–0.5)
IMM GRANULOCYTES NFR BLD AUTO: 0 % (ref 0–5)
LYMPHOCYTES # BLD: 1.2 K/UL (ref 0.5–4.6)
LYMPHOCYTES NFR BLD: 35 % (ref 13–44)
MAGNESIUM SERPL-MCNC: 2.2 MG/DL (ref 1.8–2.4)
MCH RBC QN AUTO: 24.7 PG (ref 26.1–32.9)
MCHC RBC AUTO-ENTMCNC: 30.2 G/DL (ref 31.4–35)
MCV RBC AUTO: 81.7 FL (ref 79.6–97.8)
MONOCYTES # BLD: 0.3 K/UL (ref 0.1–1.3)
MONOCYTES NFR BLD: 8 % (ref 4–12)
NEUTS SEG # BLD: 1.9 K/UL (ref 1.7–8.2)
NEUTS SEG NFR BLD: 55 % (ref 43–78)
NRBC # BLD: 0 K/UL (ref 0–0.2)
PLATELET # BLD AUTO: 78 K/UL (ref 150–450)
PLATELET COMMENTS,PCOM: ABNORMAL
PMV BLD AUTO: 10.1 FL (ref 9.4–12.3)
POTASSIUM SERPL-SCNC: 3.7 MMOL/L (ref 3.5–5.1)
PROT SERPL-MCNC: 7.3 G/DL (ref 6.3–8.2)
RBC # BLD AUTO: 3.93 M/UL (ref 4.05–5.25)
RBC MORPH BLD: ABNORMAL
RBC MORPH BLD: ABNORMAL
SODIUM SERPL-SCNC: 140 MMOL/L (ref 136–145)
WBC # BLD AUTO: 3.4 K/UL (ref 4.3–11.1)
WBC MORPH BLD: ABNORMAL

## 2020-04-03 PROCEDURE — 83735 ASSAY OF MAGNESIUM: CPT

## 2020-04-03 PROCEDURE — 80053 COMPREHEN METABOLIC PANEL: CPT

## 2020-04-03 PROCEDURE — 74011250636 HC RX REV CODE- 250/636: Performed by: INTERNAL MEDICINE

## 2020-04-03 PROCEDURE — 96375 TX/PRO/DX INJ NEW DRUG ADDON: CPT

## 2020-04-03 PROCEDURE — 96413 CHEMO IV INFUSION 1 HR: CPT

## 2020-04-03 PROCEDURE — 85025 COMPLETE CBC W/AUTO DIFF WBC: CPT

## 2020-04-03 RX ORDER — SODIUM CHLORIDE 0.9 % (FLUSH) 0.9 %
10 SYRINGE (ML) INJECTION AS NEEDED
Status: DISCONTINUED | OUTPATIENT
Start: 2020-04-03 | End: 2020-04-04 | Stop reason: HOSPADM

## 2020-04-03 RX ORDER — SODIUM CHLORIDE 9 MG/ML
25 INJECTION, SOLUTION INTRAVENOUS CONTINUOUS
Status: DISCONTINUED | OUTPATIENT
Start: 2020-04-03 | End: 2020-04-04 | Stop reason: HOSPADM

## 2020-04-03 RX ORDER — ONDANSETRON 2 MG/ML
8 INJECTION INTRAMUSCULAR; INTRAVENOUS ONCE
Status: COMPLETED | OUTPATIENT
Start: 2020-04-03 | End: 2020-04-03

## 2020-04-03 RX ADMIN — SODIUM CHLORIDE 100 ML/HR: 900 INJECTION, SOLUTION INTRAVENOUS at 14:20

## 2020-04-03 RX ADMIN — Medication 10 ML: at 14:20

## 2020-04-03 RX ADMIN — Medication 10 ML: at 16:10

## 2020-04-03 RX ADMIN — ONDANSETRON 8 MG: 2 INJECTION INTRAMUSCULAR; INTRAVENOUS at 14:51

## 2020-04-03 RX ADMIN — ADO-TRASTUZUMAB EMTANSINE 260 MG: 20 INJECTION, POWDER, LYOPHILIZED, FOR SOLUTION INTRAVENOUS at 15:25

## 2020-04-03 NOTE — PROGRESS NOTES
Pt arrived ambulatory today at [phone], to receive IV chemotherapy. Pt tolerated without difficulty. Patient discharged via ambulatory accompanied by saroj. Instructed to notify physician of any problems, questions or concerns. Allowed opportunity for patient/family to ask questions. Verbalized understanding. Next appointment is April 24 at [phone] with Riaz Connor.

## 2020-04-21 DIAGNOSIS — C50.911 MALIGNANT NEOPLASM OF RIGHT FEMALE BREAST, UNSPECIFIED ESTROGEN RECEPTOR STATUS, UNSPECIFIED SITE OF BREAST (HCC): Primary | ICD-10-CM

## 2020-04-21 DIAGNOSIS — N64.89 OTHER SPECIFIED DISORDERS OF BREAST: ICD-10-CM

## 2020-04-24 ENCOUNTER — HOSPITAL ENCOUNTER (OUTPATIENT)
Dept: INFUSION THERAPY | Age: 80
Discharge: HOME | End: 2020-04-24
Payer: MEDICARE

## 2020-04-24 VITALS — HEART RATE: 54 BPM | DIASTOLIC BLOOD PRESSURE: 46 MMHG | SYSTOLIC BLOOD PRESSURE: 114 MMHG

## 2020-04-24 DIAGNOSIS — C50.911 MALIGNANT NEOPLASM OF RIGHT BREAST IN FEMALE, ESTROGEN RECEPTOR POSITIVE, UNSPECIFIED SITE OF BREAST (HCC): Primary | ICD-10-CM

## 2020-04-24 DIAGNOSIS — Z17.0 MALIGNANT NEOPLASM OF RIGHT BREAST IN FEMALE, ESTROGEN RECEPTOR POSITIVE, UNSPECIFIED SITE OF BREAST (HCC): Primary | ICD-10-CM

## 2020-04-24 LAB
ALBUMIN SERPL-MCNC: 3 G/DL (ref 3.2–4.6)
ALBUMIN/GLOB SERPL: 0.7 {RATIO} (ref 1.2–3.5)
ALP SERPL-CCNC: 120 U/L (ref 50–136)
ALT SERPL-CCNC: 15 U/L (ref 12–65)
ANION GAP SERPL CALC-SCNC: 3 MMOL/L (ref 7–16)
AST SERPL-CCNC: 26 U/L (ref 15–37)
BASOPHILS # BLD: 0 K/UL (ref 0–0.2)
BASOPHILS NFR BLD: 1 % (ref 0–2)
BILIRUB SERPL-MCNC: 0.9 MG/DL (ref 0.2–1.1)
BUN SERPL-MCNC: 12 MG/DL (ref 8–23)
CALCIUM SERPL-MCNC: 9.2 MG/DL (ref 8.3–10.4)
CHLORIDE SERPL-SCNC: 113 MMOL/L (ref 98–107)
CO2 SERPL-SCNC: 24 MMOL/L (ref 21–32)
CREAT SERPL-MCNC: 0.85 MG/DL (ref 0.6–1)
DIFFERENTIAL METHOD BLD: ABNORMAL
EOSINOPHIL # BLD: 0 K/UL (ref 0–0.8)
EOSINOPHIL NFR BLD: 1 % (ref 0.5–7.8)
ERYTHROCYTE [DISTWIDTH] IN BLOOD BY AUTOMATED COUNT: 19.7 % (ref 11.9–14.6)
GLOBULIN SER CALC-MCNC: 4.5 G/DL (ref 2.3–3.5)
GLUCOSE SERPL-MCNC: 122 MG/DL (ref 65–100)
HCT VFR BLD AUTO: 30 % (ref 35.8–46.3)
HGB BLD-MCNC: 9.1 G/DL (ref 11.7–15.4)
IMM GRANULOCYTES # BLD AUTO: 0 K/UL (ref 0–0.5)
IMM GRANULOCYTES NFR BLD AUTO: 0 % (ref 0–5)
LYMPHOCYTES # BLD: 0.8 K/UL (ref 0.5–4.6)
LYMPHOCYTES NFR BLD: 26 % (ref 13–44)
MAGNESIUM SERPL-MCNC: 2.1 MG/DL (ref 1.8–2.4)
MCH RBC QN AUTO: 24.7 PG (ref 26.1–32.9)
MCHC RBC AUTO-ENTMCNC: 30.3 G/DL (ref 31.4–35)
MCV RBC AUTO: 81.5 FL (ref 79.6–97.8)
MONOCYTES # BLD: 0.3 K/UL (ref 0.1–1.3)
MONOCYTES NFR BLD: 9 % (ref 4–12)
NEUTS SEG # BLD: 2 K/UL (ref 1.7–8.2)
NEUTS SEG NFR BLD: 63 % (ref 43–78)
NRBC # BLD: 0 K/UL (ref 0–0.2)
PLATELET # BLD AUTO: 69 K/UL (ref 150–450)
PMV BLD AUTO: 10.1 FL (ref 9.4–12.3)
POTASSIUM SERPL-SCNC: 3.8 MMOL/L (ref 3.5–5.1)
PROT SERPL-MCNC: 7.5 G/DL (ref 6.3–8.2)
RBC # BLD AUTO: 3.68 M/UL (ref 4.05–5.25)
SODIUM SERPL-SCNC: 140 MMOL/L (ref 136–145)
WBC # BLD AUTO: 3.2 K/UL (ref 4.3–11.1)

## 2020-04-24 PROCEDURE — 96375 TX/PRO/DX INJ NEW DRUG ADDON: CPT

## 2020-04-24 PROCEDURE — 83735 ASSAY OF MAGNESIUM: CPT

## 2020-04-24 PROCEDURE — 85025 COMPLETE CBC W/AUTO DIFF WBC: CPT

## 2020-04-24 PROCEDURE — 80053 COMPREHEN METABOLIC PANEL: CPT

## 2020-04-24 PROCEDURE — 74011250636 HC RX REV CODE- 250/636: Performed by: INTERNAL MEDICINE

## 2020-04-24 PROCEDURE — 96413 CHEMO IV INFUSION 1 HR: CPT

## 2020-04-24 RX ORDER — SODIUM CHLORIDE 0.9 % (FLUSH) 0.9 %
10 SYRINGE (ML) INJECTION AS NEEDED
Status: ACTIVE | OUTPATIENT
Start: 2020-04-24 | End: 2020-04-24

## 2020-04-24 RX ORDER — ONDANSETRON 2 MG/ML
8 INJECTION INTRAMUSCULAR; INTRAVENOUS ONCE
Status: COMPLETED | OUTPATIENT
Start: 2020-04-24 | End: 2020-04-24

## 2020-04-24 RX ORDER — SODIUM CHLORIDE 9 MG/ML
25 INJECTION, SOLUTION INTRAVENOUS CONTINUOUS
Status: ACTIVE | OUTPATIENT
Start: 2020-04-24 | End: 2020-04-24

## 2020-04-24 RX ADMIN — Medication 10 ML: at 12:15

## 2020-04-24 RX ADMIN — ONDANSETRON 8 MG: 2 INJECTION INTRAMUSCULAR; INTRAVENOUS at 11:23

## 2020-04-24 RX ADMIN — SODIUM CHLORIDE 25 ML/HR: 900 INJECTION, SOLUTION INTRAVENOUS at 11:20

## 2020-04-24 RX ADMIN — ADO-TRASTUZUMAB EMTANSINE 260 MG: 20 INJECTION, POWDER, LYOPHILIZED, FOR SOLUTION INTRAVENOUS at 11:39

## 2020-04-24 RX ADMIN — Medication 10 ML: at 11:20

## 2020-04-24 NOTE — PROGRESS NOTES
Arrived to the [facility]. [place] completed. Patient tolerated well. Any issues or concerns during appointment:  Patient reports falling at home yesterday and hitting head/bottom. Reports no pain in head and unsure of exact location she hit when asked. Reid Hall RN navigator notified. .  Patient aware of next infusion appointment on 6/5 (date) at 11:00 AM (time). Discharged ambulatory.

## 2020-05-15 ENCOUNTER — HOSPITAL ENCOUNTER (OUTPATIENT)
Dept: LAB | Age: 80
Discharge: HOME | End: 2020-05-15
Payer: MEDICARE

## 2020-05-15 ENCOUNTER — APPOINTMENT (OUTPATIENT)
Dept: INFUSION THERAPY | Age: 80
End: 2020-05-15
Payer: MEDICARE

## 2020-05-15 ENCOUNTER — HOSPITAL ENCOUNTER (OUTPATIENT)
Dept: GENERAL RADIOLOGY | Age: 80
Discharge: HOME | End: 2020-05-15
Payer: MEDICARE

## 2020-05-15 ENCOUNTER — HOSPITAL ENCOUNTER (OUTPATIENT)
Dept: INFUSION THERAPY | Age: 80
Discharge: HOME | End: 2020-05-15
Payer: MEDICARE

## 2020-05-15 VITALS
RESPIRATION RATE: 18 BRPM | HEART RATE: 75 BPM | OXYGEN SATURATION: 90 % | SYSTOLIC BLOOD PRESSURE: 136 MMHG | TEMPERATURE: 98.1 F | DIASTOLIC BLOOD PRESSURE: 60 MMHG

## 2020-05-15 DIAGNOSIS — D64.9 ANEMIA, UNSPECIFIED TYPE: ICD-10-CM

## 2020-05-15 DIAGNOSIS — G20 PARKINSON'S DISEASE (HCC): ICD-10-CM

## 2020-05-15 DIAGNOSIS — N39.0 FREQUENT UTI: ICD-10-CM

## 2020-05-15 DIAGNOSIS — R13.10 DYSPHAGIA, UNSPECIFIED TYPE: ICD-10-CM

## 2020-05-15 LAB
APPEARANCE UR: CLEAR
BASOPHILS # BLD: 0 K/UL (ref 0–0.2)
BASOPHILS NFR BLD: 1 % (ref 0–2)
BILIRUB UR QL: NEGATIVE
BNP SERPL-MCNC: 306 PG/ML
COLOR UR: YELLOW
DIFFERENTIAL METHOD BLD: ABNORMAL
EOSINOPHIL # BLD: 0 K/UL (ref 0–0.8)
EOSINOPHIL NFR BLD: 1 % (ref 0.5–7.8)
ERYTHROCYTE [DISTWIDTH] IN BLOOD BY AUTOMATED COUNT: 21.4 % (ref 11.9–14.6)
GLUCOSE UR STRIP.AUTO-MCNC: NEGATIVE MG/DL
HCT VFR BLD AUTO: 27.5 % (ref 35.8–46.3)
HGB BLD-MCNC: 8.1 G/DL (ref 11.7–15.4)
HGB UR QL STRIP: NEGATIVE
IMM GRANULOCYTES # BLD AUTO: 0 K/UL (ref 0–0.5)
IMM GRANULOCYTES NFR BLD AUTO: 1 % (ref 0–5)
KETONES UR QL STRIP.AUTO: NEGATIVE MG/DL
LEUKOCYTE ESTERASE UR QL STRIP.AUTO: NEGATIVE
LYMPHOCYTES # BLD: 0.6 K/UL (ref 0.5–4.6)
LYMPHOCYTES NFR BLD: 16 % (ref 13–44)
MCH RBC QN AUTO: 24.8 PG (ref 26.1–32.9)
MCHC RBC AUTO-ENTMCNC: 29.5 G/DL (ref 31.4–35)
MCV RBC AUTO: 84.1 FL (ref 79.6–97.8)
MONOCYTES # BLD: 0.3 K/UL (ref 0.1–1.3)
MONOCYTES NFR BLD: 9 % (ref 4–12)
NEUTS SEG # BLD: 2.8 K/UL (ref 1.7–8.2)
NEUTS SEG NFR BLD: 74 % (ref 43–78)
NITRITE UR QL STRIP.AUTO: NEGATIVE
NRBC # BLD: 0 K/UL (ref 0–0.2)
PH UR STRIP: 6 [PH] (ref 5–9)
PLATELET # BLD AUTO: 81 K/UL (ref 150–450)
PMV BLD AUTO: 11.8 FL (ref 9.4–12.3)
PROT UR STRIP-MCNC: NEGATIVE MG/DL
RBC # BLD AUTO: 3.27 M/UL (ref 4.05–5.2)
SP GR UR REFRACTOMETRY: 1.01 (ref 1–1.02)
UROBILINOGEN UR QL STRIP.AUTO: 0.2 EU/DL (ref 0.2–1)
WBC # BLD AUTO: 3.9 K/UL (ref 4.3–11.1)

## 2020-05-15 PROCEDURE — 77030018667 ADMN ST IV BLD FENW -A

## 2020-05-15 PROCEDURE — 83880 ASSAY OF NATRIURETIC PEPTIDE: CPT

## 2020-05-15 PROCEDURE — 92611 MOTION FLUOROSCOPY/SWALLOW: CPT

## 2020-05-15 PROCEDURE — 36430 TRANSFUSION BLD/BLD COMPNT: CPT

## 2020-05-15 PROCEDURE — 74011250637 HC RX REV CODE- 250/637: Performed by: INTERNAL MEDICINE

## 2020-05-15 PROCEDURE — 81003 URINALYSIS AUTO W/O SCOPE: CPT

## 2020-05-15 PROCEDURE — 74230 X-RAY XM SWLNG FUNCJ C+: CPT

## 2020-05-15 PROCEDURE — 85025 COMPLETE CBC W/AUTO DIFF WBC: CPT

## 2020-05-15 PROCEDURE — 36415 COLL VENOUS BLD VENIPUNCTURE: CPT

## 2020-05-15 PROCEDURE — 86923 COMPATIBILITY TEST ELECTRIC: CPT

## 2020-05-15 PROCEDURE — P9016 RBC LEUKOCYTES REDUCED: HCPCS

## 2020-05-15 PROCEDURE — 96374 THER/PROPH/DIAG INJ IV PUSH: CPT

## 2020-05-15 PROCEDURE — 74011250636 HC RX REV CODE- 250/636: Performed by: INTERNAL MEDICINE

## 2020-05-15 PROCEDURE — 74011250636 HC RX REV CODE- 250/636: Performed by: NURSE PRACTITIONER

## 2020-05-15 PROCEDURE — 74011000255 HC RX REV CODE- 255: Performed by: PSYCHIATRY & NEUROLOGY

## 2020-05-15 PROCEDURE — 86900 BLOOD TYPING SEROLOGIC ABO: CPT

## 2020-05-15 RX ORDER — FUROSEMIDE 10 MG/ML
20 INJECTION INTRAMUSCULAR; INTRAVENOUS ONCE
Status: DISCONTINUED | OUTPATIENT
Start: 2020-05-15 | End: 2020-05-15 | Stop reason: SDUPTHER

## 2020-05-15 RX ORDER — FUROSEMIDE 10 MG/ML
20 INJECTION INTRAMUSCULAR; INTRAVENOUS ONCE
Status: COMPLETED | OUTPATIENT
Start: 2020-05-15 | End: 2020-05-15

## 2020-05-15 RX ORDER — SODIUM CHLORIDE 0.9 % (FLUSH) 0.9 %
10-40 SYRINGE (ML) INJECTION AS NEEDED
Status: DISCONTINUED | OUTPATIENT
Start: 2020-05-15 | End: 2020-05-19 | Stop reason: HOSPADM

## 2020-05-15 RX ORDER — ACETAMINOPHEN 325 MG/1
650 TABLET ORAL ONCE
Status: COMPLETED | OUTPATIENT
Start: 2020-05-15 | End: 2020-05-15

## 2020-05-15 RX ORDER — DIPHENHYDRAMINE HCL 25 MG
25 CAPSULE ORAL
Status: DISCONTINUED | OUTPATIENT
Start: 2020-05-15 | End: 2020-05-16 | Stop reason: HOSPADM

## 2020-05-15 RX ORDER — SODIUM CHLORIDE 9 MG/ML
250 INJECTION, SOLUTION INTRAVENOUS AS NEEDED
Status: DISCONTINUED | OUTPATIENT
Start: 2020-05-15 | End: 2020-05-19 | Stop reason: HOSPADM

## 2020-05-15 RX ADMIN — DIPHENHYDRAMINE HYDROCHLORIDE 25 MG: 25 CAPSULE ORAL at 15:27

## 2020-05-15 RX ADMIN — ACETAMINOPHEN 650 MG: 325 TABLET, FILM COATED ORAL at 15:26

## 2020-05-15 RX ADMIN — BARIUM SULFATE 45 ML: 980 POWDER, FOR SUSPENSION ORAL at 10:00

## 2020-05-15 RX ADMIN — Medication 10 ML: at 19:15

## 2020-05-15 RX ADMIN — BARIUM SULFATE 15 ML: 400 PASTE ORAL at 10:00

## 2020-05-15 RX ADMIN — FUROSEMIDE 20 MG: 10 INJECTION, SOLUTION INTRAMUSCULAR; INTRAVENOUS at 18:33

## 2020-05-15 RX ADMIN — SODIUM CHLORIDE 250 ML: 900 INJECTION, SOLUTION INTRAVENOUS at 15:46

## 2020-05-15 NOTE — PROGRESS NOTES
Received report from Brody Rey RN, at this time. Patient restful in the chair, receiving a blood transfusion. Vital signs are stable except for room air oxygen saturation is 84%-85%, but increases to 95% to 97% after a few minutes of talking with patient. No acute distress noted. Denies pain. No coughing noted. She denies any shortness of breath while in infusion chair. Call placed to Shavonne Rm NP. Explained to her of the above situation. Informed her of how the oxygen saturation levels are on room air. She states that patient can be discharged tonight, after the blood transfusion and the dose of lasix, as long as she is not in any distress. She states that patient would need to be sent to the E.R. if she is any distress. She also states to inform Ryanne Basilio, patient's family member, that she needs to take her to the E.R. at any point in time if she notices any worsening change or condition with patient. Talked to Ryanne Basilio about this and she verbalizes understanding.

## 2020-05-15 NOTE — PROGRESS NOTES
Jackeline Neff  : 1940  [place]  Secondary: Hernán Melendrez 13 at [facility]  [address] [facility], [address] [place] [address], [place] [address] [address] [facility]  [phone]   [phone]        OUTPATIENT SPEECH LANGUAGE PATHOLOGY: MODIFIED BARIUM SWALLOW    ICD-10: Treatment Diagnosis: Oropharyngeal dysphagia (R13.12)  DATE: 5/15/2020  REFERRING PHYSICIAN: Neville Tubbs,*  MD Orders: Modifed Barium Swallow  PAST MEDICAL HISTORY:   Ms. Mira Baker is a [de-identified] y.o. female who  has a past medical history of Anxiety, Cancer ([address] Cancer of right breast ([address] (Dx 2019), Crohn's disease ([address] Hypertension, Psychiatric disorder, and Thyroid disease. She also  has a past surgical history that includes hx hysterectomy; hx lumbar laminectomy; ir insert tunl cvc w port over 5 years (2019); and hx vascular access. Rosalina Crouch  MEDICAL/REFERRING DIAGNOSIS: Dysphagia, unspecified type [R13.10]  Parkinson's disease ([address] [G20]  PRECAUTIONS/ALLERGIES: Betadine [povidone-iodine]; Pcn [penicillins]; and Pentasa [mesalamine]   ASSESSMENT/PLAN OF CARE:Based on the objective data described above, Ms. Mira Baker presents with mild oropharyngeal characterized by delayed oral propulsion and mild oral holding with solid textures that improved with liquid wash. With consecutive straw sips, patient again with oral holding which she attributes to taste of barium and reluctance to swallow. Flash penetration with consecutive straw sips that fully cleared from laryngeal vestibule. Penetration appeared to be related to timing/bolus holding rather than pharyngeal impairment. No additional instances of penetration or aspiration with thin liquids via tsp/cup/straw, puree, mixed, or solid consistencies. Discussed results of assessment with patient. Recommend continue regular diet/thin liquids. Increase use of sauces and gravies. Single sips from straw only.  She expressed understanding and agreement with recommendations. Factors increasing risk for aspiration: age  and Parkinson's Disease    RECOMMENDATIONS AND PLANNED INTERVENTIONS  DIET:    continue prescribed diet   PO diet texture:  Regular   Liquids:  regular thin  MEDICATIONS: With liquid    COMPENSATORY STRATEGIES/MODIFICATIONS INCLUDING:  · Upright for all PO  · Alternate liquids/solids  · Increase use of sauces and gravies to moisten dry textures. ·   OTHER RECOMMENDATIONS (including follow up treatment recommendations):   · No additional speech therapy indicated at this time. Thank you for this referral,  Princess Diaz, MSP, CCC-SLP        SUBJECTIVE:Patient pleasant and cooperative. Reports increased difficulty with solids foods over last several weeks. She reports sensation of food \"growing\" in her mouth as she chews. Primarily consuming liquids and soups for nutrition. Denies significant coughing with thin liquids. Current dietary status prior to evaluation today:  Regular diet/thin liquids  Previous Dysphagia: N/A  Previous Modified Barium Swallow studies: N/A    OBJECTIVE:Orientation:    Person   Place   Time   Situation    Oral Assessment:  Labial: No impairment  Dentition: Intact  Oral Hygiene: Adequate  Lingual: No impairment  Vocal Quality: WFL  Speech Inteligiblity: WFL    Modified barium swallow study was performed in the radiology suite with Ms. Nadege Godfrey in the lateral plane seated upright 90° in the [address] [place] [address] Rd chair. To evaluate her swallow function, barium coated liquid and food was administered in the form of thin liquids (by spoon, cup sip, straw sip and serial swallows), pudding, mixed consistency and cracker. Oral phase of swallow:    delayed oral transit   Bolus holding with solids; serial sips from straw that she attributes to taste    Pharyngeal phase of swallow:    Swallows of thin liquids were timely.  There was transient laryngeal penetration observed during the swallow from consecutive straw sips (second sip) that was cleared spontaneously during the swallow. No pharyngeal residue after swallow.  Swallows of pudding were timely. No laryngeal penetration or aspiration was observed. No pharyngeal residue after swallow.  Swallows of mixed consistencies were timely. No laryngeal penetration or aspiration was observed. No pharyngeal residue after swallow.  Swallows of cracker were delayed. No laryngeal penetration or aspiration was observed. No pharyngeal residue after swallow. Pharyngeal characteristics:   functional pharyngeal swallow  Attempted strategies:    none  Effective strategies:    none  Aspiration/Penetration Scale: 2 (Penetration/no residue. Contrast enters the larynx, remains above the folds/cords, and is cleared.)    Cervical esophageal phase of swallow:    adequate and timely clearance of all boluses through cervical esophagus  **Distal esophagus not assessed due to limitations of MBS study. **    Assessment only; no treatment provided today. Objective Measure: Tool Used: National Outcomes Measurement System: Functional Communication Measures: SWALLOWING  Score:  Initial: 6     Interpretation of Tool: This measure describes the change in functional communication status subsequent to speech-language pathology treatment of patients with dysphagia.  Level 1:  Individual is not able to swallow anything safely by mouth. All nutrition and hydration is received through non-oral means (e.g., nasogastric tube, PEG).  Level 2: Individual is not able to swallow safely by mouth for nutrition and hydration, but may take some consistency with consistent maximal cues in therapy only. Alternative method of feeding required.    Level 3:  Alternative method of feeding required as individual takes less than 50% of nutrition and hydration by mouth, and/or swallowing is safe with consistent use of moderate cues to use compensatory strategies and/or requires maximum diet restriction.  Level 4:  Swallowing is safe, but usually requires moderate cues to use compensatory strategies, and/or the individual has moderate diet restrictions and/or still requires tube feeding and/or oral supplements.  Level 5:  Swallowing is safe with minimal diet restriction and/or occasionally requires minimal cueing to use compensatory strategies. The individual may occasionally self-cue. All nutrition and hydration needs are met by mouth at mealtime.  Level 6:  Swallowing is safe, and the individual eats and drinks independently and may rarely require minimal cueing. The individual usually self-cues when difficulty occurs. May need to avoid specific food items (e.g., popcorn and nuts), or require additional time (due to dysphagia).  Level 7: The individuals ability to eat independently is not limited by swallow function. Swallowing would be safe and efficient for all consistencies. Compensatory strategies are effectively used when needed. Recommendations for treatment: No treatment recommended at this time. Patient/Family education:    Ms. Evangelina Banks was educated on the following topics: anatomy and physiology of the swallowing mechanism and results and recommendations from this assessment. All questions were answered and comprehension of education was expressed.        Total Treatment Duration:  Time In: 0930   Time Out: [address], MSP, CCC-SLP

## 2020-05-15 NOTE — PROGRESS NOTES
Patient discharged via W/C. She has voided x2, with the assistance of this R.N., after the dose of lasix. No distress noted at this time. She was taken to the car via W/C, and her family member, Jorge A Fisher. Will be driving her home. The blood discharge instruction sheet was given to patient and Jorge A Fisher and was reviewed. They were instructed to call Dr. Neida Barnett office immediately for any problems or concerns and verbalize understanding.

## 2020-05-16 LAB
ABO + RH BLD: NORMAL
BLD PROD TYP BPU: NORMAL
BLOOD GROUP ANTIBODIES SERPL: NORMAL
BPU ID: NORMAL
CROSSMATCH RESULT,%XM: NORMAL
SPECIMEN EXP DATE BLD: NORMAL
STATUS OF UNIT,%ST: NORMAL
UNIT DIVISION, %UDIV: 0

## 2020-05-18 ENCOUNTER — HOSPITAL ENCOUNTER (OUTPATIENT)
Dept: MRI IMAGING | Age: 80
Discharge: HOME | End: 2020-05-18
Attending: NURSE PRACTITIONER
Payer: MEDICARE

## 2020-05-18 DIAGNOSIS — Z17.0 MALIGNANT NEOPLASM OF RIGHT BREAST IN FEMALE, ESTROGEN RECEPTOR POSITIVE, UNSPECIFIED SITE OF BREAST (HCC): ICD-10-CM

## 2020-05-18 DIAGNOSIS — C50.911 MALIGNANT NEOPLASM OF RIGHT BREAST IN FEMALE, ESTROGEN RECEPTOR POSITIVE, UNSPECIFIED SITE OF BREAST (HCC): ICD-10-CM

## 2020-05-18 DIAGNOSIS — R41.0 CONFUSION: ICD-10-CM

## 2020-05-18 PROCEDURE — A9575 INJ GADOTERATE MEGLUMI 0.1ML: HCPCS | Performed by: NURSE PRACTITIONER

## 2020-05-18 PROCEDURE — 74011250636 HC RX REV CODE- 250/636: Performed by: NURSE PRACTITIONER

## 2020-05-18 PROCEDURE — 70553 MRI BRAIN STEM W/O & W/DYE: CPT

## 2020-05-18 RX ORDER — HEPARIN SODIUM (PORCINE) LOCK FLUSH IV SOLN 100 UNIT/ML 100 UNIT/ML
500 SOLUTION INTRAVENOUS ONCE
Status: COMPLETED | OUTPATIENT
Start: 2020-05-18 | End: 2020-05-18

## 2020-05-18 RX ORDER — SODIUM CHLORIDE 0.9 % (FLUSH) 0.9 %
10 SYRINGE (ML) INJECTION
Status: COMPLETED | OUTPATIENT
Start: 2020-05-18 | End: 2020-05-18

## 2020-05-18 RX ORDER — GADOTERATE MEGLUMINE 376.9 MG/ML
15 INJECTION INTRAVENOUS
Status: COMPLETED | OUTPATIENT
Start: 2020-05-18 | End: 2020-05-18

## 2020-05-18 RX ADMIN — GADOTERATE MEGLUMINE 15 ML: 376.9 INJECTION INTRAVENOUS at 15:47

## 2020-05-18 RX ADMIN — HEPARIN SODIUM (PORCINE) LOCK FLUSH IV SOLN 100 UNIT/ML 500 UNITS: 100 SOLUTION at 16:09

## 2020-05-18 RX ADMIN — Medication 10 ML: at 15:47

## 2020-05-22 NOTE — THERAPY EVALUATION
Kathy Guzman  : 1940  [place]  Secondary: [address] at [facility] [address], [address] [facility], [place] [address] [address] [facility]  [phone]   [phone]        OUTPATIENT SPEECH LANGUAGE PATHOLOGY: Initial Assessment  ICD-10: Treatment Diagnosis: dysphagia, oropharyngeal R 13.12  REFERRING PHYSICIAN: Wenceslao Moody NP MD Orders: speech evaluate and treat   PAST MEDICAL HISTORY:    Ms. Elsa Kellogg is a [de-identified] y.o. female who  has a past medical history of Anxiety, Cancer ([address] Cancer of right breast ([address] (Dx 2019), Crohn's disease ([address] Heart failure ([address] Hypertension, Psychiatric disorder, and Thyroid disease. She also  has a past surgical history that includes hx hysterectomy; hx lumbar laminectomy; ir insert tunl cvc w port over 5 years (2019); and hx vascular access. MEDICAL/REFERRING DIAGNOSIS: Parkinson disease ([address] [G20]  Dysphagia, unspecified type [R13.10]  DATE OF ONSET: 2019  PRIOR LEVEL OF FUNCTION: residing with her niece   PRECAUTIONS/ALLERGIES: Betadine [povidone-iodine]; Pcn [penicillins]; and Pentasa [mesalamine]      ASSESSMENT:  Ms. Elsa Kellogg is an [de-identified] y/o female referred to  due to dysphagia. She had a MBS completed 5/15 with the following results: \"Recommend continue regular diet/thin liquids. Increase use of sauces and gravies. \"  Single sips were recommended with straws. The pt reported if she tries to eat anything that has bread with it \"it gets bigger and bigger in my mouth and it's hard to swallow\". She reported as a result she eats mostly soups and soft foods. Her niece reported the pt \"avoids wide noodles like egg noodles, chicken and just about anything with protein\". She reported the pt eats mainly naturally pureed foods such as grits, cottage cheese, yogurt, applesauce. She reported the pt does eat chicken noodle soup as the noodles are small.   She also reported the pt doesn't like salty or spicy foods. The pt had radiation for breast cancer in 2019 which is when swallowing difficulties began per their report. Her niece also reported the pt doesn't have much of an appetite. She reported they have tried two different appetite stimulants but they didn't appear to helped. The pt has lost about 55 lbs over the past year as a result of decreased appetite and dysphagia. Her niece also reported the patient was dx with sudden onset anxiety about 3 years which has \"perpetuated the swallowing problem\". She reported the pt takes whole medication in applesauce in the morning as the she began strangling with medications with liquids. However, the pt can take medications with liquids later as it's less medication and the pt is more alert per niece report. Her niece also reported she feels the pt strangles with water because the pt holds the water in her mouth. Noted the pt was diagnosed with Parkinson's disease in September of 2019. Her niece reported the pt began exhibiting symptoms approximately 3 years prior. Based on the objective data described below, the patient presents with dysphagia. An oral motor evaluation revealed decreased labial ROM only. The pt was given trials thin liquids via cup and straw, pureed and solids. Pt with inconsistent oral holding with liquids and pureed this date. Mildly increased mastication with solids. Strong coughing was observed with the first trial of consecutive sips via cup only. No signs/sx aspiration with following trial of thin via cup with consecutive sips or single or consecutive straw sips. Pt's niece reported the pt coughs about every time she drinks. She also reported the pt does use straws at home and does take consecutive sips. Patient will benefit from skilled intervention to address the below impairments. ?????? ? ? This section established at most recent assessment??????????  PROBLEM LIST (Impairments causing functional limitations):  1. Dysphagia   GOALS: (Goals have been discussed and agreed upon with patient.)  SHORT-TERM FUNCTIONAL GOALS: Time Frame: 3 months   Pt will complete oral motor exercises with 80% accuracy. Pt will complete exercises for bolus hold with 80% accuracy. Pt will complete laryngeal exercises with 80% accuracy. DISCHARGE GOALS: Time Frame: 4-5 months  1. Pt will tolerate least restrictive diet without signs/sx aspiration 100% for safe swallow function. REHABILITATION POTENTIAL FOR STATED GOALS: GoodPLAN OF CARE:  Patient will benefit from skilled intervention to address the following impairments. RECOMMENDATIONS AND PLANNED INTERVENTIONS (Benefits and precautions of therapy have been discussed with the patient.):  · continue prescribed diet  MEDICATIONS:  · With liquid  · whole in pureed  COMPENSATORY STRATEGIES/MODIFICATIONS INCLUDING:  · Single sips if using straws   OTHER RECOMMENDATIONS (including follow up treatment recommendations):   · Oral motor exercises  · Laryngeal exercises  RECOMMENDED DIET MODIFICATIONS DISCUSSED WITH:  · Family  · Patient  TREATMENT PLAN EFFECTIVE DATES: 5/26/2020 TO 8/26/2020 (90 days). FREQUENCY/DURATION: Continue to follow patient 1 time a week for 90 days to address above goals. Regarding Chana Edge's therapy, I certify that the treatment plan above will be carried out by a therapist or under their direction. Thank you for this referral,  Whitney Joseph, [facility] [address] [id number] [facility]                    Referring Physician Signature: Joette Blizzard, NP    Date      SUBJECTIVE:  Pt cooperative. Pt's niece present. Present Symptoms: coughing with po       Current Medications: see hard chart   Date Last Reviewed: 5/26/2020  Current Dietary Status:  mech soft      History of reflux:  YES    Reflux medication: Esomeprazole  Social History/Home Situation: residing with her niece       Work/Activity History: retired     OBJECTIVE:  Objective Measure:   Tool Used: [facility] Outcomes Measurement System: Functional Communication Measures: SWALLOWING  Score:  Initial: 5 Most Recent: X (Date: -- )   Interpretation of Tool: This measure describes the change in functional communication status subsequent to speech-language pathology treatment of patients with dysphagia.  o Level 1:  Individual is not able to swallow anything safely by mouth. All nutrition and hydration is received through non-oral means (e.g., nasogastric tube, PEG). o Level 2: Individual is not able to swallow safely by mouth for nutrition and hydration, but may take some consistency with consistent maximal cues in therapy only. Alternative method of feeding required. o Level 3:  Alternative method of feeding required as individual takes less than 50% of nutrition and hydration by mouth, and/or swallowing is safe with consistent use of moderate cues to use compensatory strategies and/or requires maximum diet restriction. o Level 4:  Swallowing is safe, but usually requires moderate cues to use compensatory strategies, and/or the individual has moderate diet restrictions and/or still requires tube feeding and/or oral supplements. o Level 5:  Swallowing is safe with minimal diet restriction and/or occasionally requires minimal cueing to use compensatory strategies. The individual may occasionally self-cue. All nutrition and hydration needs are met by mouth at mealtime. o Level 6:  Swallowing is safe, and the individual eats and drinks independently and may rarely require minimal cueing. The individual usually self-cues when difficulty occurs. May need to avoid specific food items (e.g., popcorn and nuts), or require additional time (due to dysphagia). o Level 7: The individuals ability to eat independently is not limited by swallow function. Swallowing would be safe and efficient for all consistencies. Compensatory strategies are effectively used when needed.   Score Level 7 Level 6 Level 5 Level 4 Level 3 Level 2 Level 1   Modifier CH CI CJ CK CL CM CN     Oral Motor Structure/Speech:  Oral-Motor Structure/Motor Speech  Labial: Other (comment)(decreased ROM)  Dentition: Intact, Natural  Oral Hygiene: adequate  Lingual: No impairment    Cognitive and Communication Status:  Neurologic State: Alert    BEDSIDE SWALLOW EVALUATION  Oral Assessment:  Oral Assessment  Labial: Other (comment)(decreased ROM)  Dentition: Intact; Natural  Oral Hygiene: adequate  Lingual: No impairment  P.O. Trials:  Patient Position: upright in chair    The patient was given teaspoon to straw amounts of the following:   Consistency Presented: Puree; Solid; Thin liquid  How Presented: Self-fed/presented;Cup/sip;Spoon;Straw;Successive swallows    ORAL PHASE:  Bolus Acceptance: No impairment  Bolus Formation/Control: Impaired  Propulsion: No impairment  Type of Impairment: Mastication  Oral Residue: None    PHARYNGEAL PHASE:  Initiation of Swallow: Delayed (# of seconds)  Laryngeal Elevation: Functional  Aspiration Signs/Symptoms: Strong cough  Vocal Quality: No impairment                OTHER OBSERVATIONS:  Rate/bite size: WNL   Endurance: WNL     TREATMENT:    (In addition to Assessment/Re-Assessment sessions the following treatments were rendered)  Assessment only; No treatment(s) provided today      ORAL MOTOR  EXERCISES:                                                                                                                                                                      LARYNGEAL / PHARYNGEAL EXERCISES:                                                                                                                                     __________________________________________________________________________________________________  Treatment Assessment:   .   Progression/Medical Necessity:   · Skilled intervention continues to be required due to persistent signs and symptoms of aspiration and patient still consuming a modified diet.  Compliance with Program/Exercises: Will assess as treatment progresses. Reason for Continuation of Services/Other Comments:  · Patient continues to require skilled intervention due to dysphagia . Recommendations/Intent for next treatment session: \"Treatment next visit will focus on oral motor, laryngeal exercises\".      Total Treatment Duration:  Time In: 1435  Time Out: 4901 ARTURO Casey, CCC-SLP

## 2020-05-26 ENCOUNTER — HOSPITAL ENCOUNTER (OUTPATIENT)
Dept: PHYSICAL THERAPY | Age: 80
Discharge: HOME | End: 2020-05-26
Payer: MEDICARE

## 2020-05-26 DIAGNOSIS — G20 PARKINSON DISEASE (HCC): ICD-10-CM

## 2020-05-26 DIAGNOSIS — R13.10 DYSPHAGIA, UNSPECIFIED TYPE: ICD-10-CM

## 2020-05-26 PROCEDURE — 92610 EVALUATE SWALLOWING FUNCTION: CPT

## 2020-06-02 ENCOUNTER — HOSPITAL ENCOUNTER (OUTPATIENT)
Dept: PHYSICAL THERAPY | Age: 80
Discharge: HOME | End: 2020-06-02
Payer: MEDICARE

## 2020-06-02 PROCEDURE — 92507 TX SP LANG VOICE COMM INDIV: CPT

## 2020-06-02 NOTE — PROGRESS NOTES
Anna Marie Tang  : 1940  [place]  Secondary: Hernán Mel[address] at [facility] [address], [address] [facility], [address]  [phone]   [phone]        OUTPATIENT SPEECH LANGUAGE PATHOLOGY: Daily Note: 1  ICD-10: Treatment Diagnosis: dysphagia, oropharyngeal R 13.12  REFERRING PHYSICIAN: Halle Moody NP MD Orders: speech evaluate and treat   PAST MEDICAL HISTORY:    Ms. Nadege Godfrey is a [de-identified] y.o. female who  has a past medical history of Anxiety, Cancer ([facility] [address] Cancer of right breast ([facility] [address] (Dx 2019), Crohn's disease ([facility] [address] Heart failure ([address] Hypertension, Psychiatric disorder, and Thyroid disease. She also  has a past surgical history that includes hx hysterectomy; hx lumbar laminectomy; ir insert tunl cvc w port over 5 years (2019); and hx vascular access. MEDICAL/REFERRING DIAGNOSIS: Parkinson's disease [G20]  Dysphagia, unspecified [R13.10]  DATE OF ONSET: 2019  PRIOR LEVEL OF FUNCTION: residing with her niece   PRECAUTIONS/ALLERGIES: Betadine [povidone-iodine]; Pcn [penicillins]; and Pentasa [mesalamine]      ASSESSMENT:  Pt reported she hasn't remembered to take single sips from straws at home. She did well with exercises this date. Mod weakness and tremors observed with lingual resistance. Patient will benefit from skilled intervention to address the below impairments. ?????? ? ? This section established at most recent assessment??????????  PROBLEM LIST (Impairments causing functional limitations):  1. Dysphagia   GOALS: (Goals have been discussed and agreed upon with patient.)  SHORT-TERM FUNCTIONAL GOALS: Time Frame: 3 months   Pt will complete oral motor exercises with 80% accuracy. Pt will complete exercises for bolus hold with 80% accuracy. Pt will complete laryngeal exercises with 80% accuracy. DISCHARGE GOALS: Time Frame: 4-5 months  1.  Pt will tolerate least restrictive diet without signs/sx aspiration 100% for safe swallow function. REHABILITATION POTENTIAL FOR STATED GOALS: GoodPLAN OF CARE:  Patient will benefit from skilled intervention to address the following impairments. RECOMMENDATIONS AND PLANNED INTERVENTIONS (Benefits and precautions of therapy have been discussed with the patient.):  · continue prescribed diet  MEDICATIONS:  · With liquid  · whole in pureed  COMPENSATORY STRATEGIES/MODIFICATIONS INCLUDING:  · Single sips if using straws   OTHER RECOMMENDATIONS (including follow up treatment recommendations):   · Oral motor exercises  · Laryngeal exercises  RECOMMENDED DIET MODIFICATIONS DISCUSSED WITH:  · Family  · Patient  TREATMENT PLAN EFFECTIVE DATES: 5/26/2020 TO 8/26/2020 (90 days). FREQUENCY/DURATION: Continue to follow patient 1 time a week for 90 days to address above goals. Regarding Chana WM Edge's therapy, I certify that the treatment plan above will be carried out by a therapist or under their direction. Thank you for this referral,  Maxi Kaur  [address] [address] [facility]                    Referring Physician Signature: Afia Brown NP    Date      SUBJECTIVE:  Pt cooperative. Pt's niece present. Present Symptoms: coughing with po       Current Medications: see hard chart   Date Last Reviewed: 6/2/2020  Current Dietary Status:  [facility] soft      History of reflux:  YES    Reflux medication: Esomeprazole  Social History/Home Situation: residing with her niece       Work/Activity History: retired     OBJECTIVE:  Objective Measure: Tool Used: National Outcomes Measurement System: Functional Communication Measures: SWALLOWING  Score:  Initial: 5 Most Recent: X (Date: -- )   Interpretation of Tool: This measure describes the change in functional communication status subsequent to speech-language pathology treatment of patients with dysphagia.  o Level 1:  Individual is not able to swallow anything safely by mouth.  All nutrition and hydration is received through non-oral means (e.g., nasogastric tube, PEG). o Level 2: Individual is not able to swallow safely by mouth for nutrition and hydration, but may take some consistency with consistent maximal cues in therapy only. Alternative method of feeding required. o Level 3:  Alternative method of feeding required as individual takes less than 50% of nutrition and hydration by mouth, and/or swallowing is safe with consistent use of moderate cues to use compensatory strategies and/or requires maximum diet restriction. o Level 4:  Swallowing is safe, but usually requires moderate cues to use compensatory strategies, and/or the individual has moderate diet restrictions and/or still requires tube feeding and/or oral supplements. o Level 5:  Swallowing is safe with minimal diet restriction and/or occasionally requires minimal cueing to use compensatory strategies. The individual may occasionally self-cue. All nutrition and hydration needs are met by mouth at mealtime. o Level 6:  Swallowing is safe, and the individual eats and drinks independently and may rarely require minimal cueing. The individual usually self-cues when difficulty occurs. May need to avoid specific food items (e.g., popcorn and nuts), or require additional time (due to dysphagia). o Level 7: The individuals ability to eat independently is not limited by swallow function. Swallowing would be safe and efficient for all consistencies. Compensatory strategies are effectively used when needed. Score Level 7 Level 6 Level 5 Level 4 Level 3 Level 2 Level 1   Modifier CH CI CJ CK CL CM CN       Cognitive and Communication Status:  Mental status: alert     TREATMENT:    (In addition to Assessment/Re-Assessment sessions the following treatments were rendered)  Assessment only;  No treatment(s) provided today      ORAL MOTOR  EXERCISES:                                                                                                                               Lingual Resistance: Yes  Reps : 10  Sets : 1           \"OO-EE\": Yes  Reps: 10  Sets : 2            Roof mouth lick:2 sets of 10. LARYNGEAL / PHARYNGEAL EXERCISES:           Effortful Swallow: Yes  Reps : 10(in conjunction with bolus hold; cough x1)  Sets : 2                                         Mendelsohn Maneuver: Yes  Reps : 10(90%; 80%)  Sets : 2                                                                   __________________________________________________________________________________________________  Treatment Assessment:   . Progression/Medical Necessity:   · Skilled intervention continues to be required due to persistent signs and symptoms of aspiration and patient still consuming a modified diet. Compliance with Program/Exercises: Will assess as treatment progresses. Reason for Continuation of Services/Other Comments:  · Patient continues to require skilled intervention due to dysphagia . Recommendations/Intent for next treatment session: \"Treatment next visit will focus on oral motor, laryngeal exercises\".      Total Treatment Duration:  Time In: 1105  Time Out: [address]ag[address] Avila, MSP, CCC-SLP

## 2020-06-03 ENCOUNTER — APPOINTMENT (OUTPATIENT)
Dept: PHYSICAL THERAPY | Age: 80
End: 2020-06-03
Payer: MEDICARE

## 2020-06-05 ENCOUNTER — HOSPITAL ENCOUNTER (OUTPATIENT)
Dept: INFUSION THERAPY | Age: 80
Discharge: HOME | End: 2020-06-05
Payer: MEDICARE

## 2020-06-05 DIAGNOSIS — Z17.0 MALIGNANT NEOPLASM OF RIGHT BREAST IN FEMALE, ESTROGEN RECEPTOR POSITIVE, UNSPECIFIED SITE OF BREAST (HCC): ICD-10-CM

## 2020-06-05 DIAGNOSIS — C50.911 MALIGNANT NEOPLASM OF RIGHT BREAST IN FEMALE, ESTROGEN RECEPTOR POSITIVE, UNSPECIFIED SITE OF BREAST (HCC): ICD-10-CM

## 2020-06-05 LAB
ALBUMIN SERPL-MCNC: 3.1 G/DL (ref 3.2–4.6)
ALBUMIN/GLOB SERPL: 0.7 {RATIO} (ref 1.2–3.5)
ALP SERPL-CCNC: 166 U/L (ref 50–136)
ALT SERPL-CCNC: 19 U/L (ref 12–65)
ANION GAP SERPL CALC-SCNC: 7 MMOL/L (ref 7–16)
AST SERPL-CCNC: 45 U/L (ref 15–37)
BASOPHILS # BLD: 0 K/UL (ref 0–0.2)
BASOPHILS NFR BLD: 1 % (ref 0–2)
BILIRUB SERPL-MCNC: 0.7 MG/DL (ref 0.2–1.1)
BUN SERPL-MCNC: 14 MG/DL (ref 8–23)
CALCIUM SERPL-MCNC: 9.3 MG/DL (ref 8.3–10.4)
CHLORIDE SERPL-SCNC: 107 MMOL/L (ref 98–107)
CO2 SERPL-SCNC: 25 MMOL/L (ref 21–32)
CREAT SERPL-MCNC: 0.8 MG/DL (ref 0.6–1)
DIFFERENTIAL METHOD BLD: ABNORMAL
EOSINOPHIL # BLD: 0 K/UL (ref 0–0.8)
EOSINOPHIL NFR BLD: 1 % (ref 0.5–7.8)
ERYTHROCYTE [DISTWIDTH] IN BLOOD BY AUTOMATED COUNT: 20.9 % (ref 11.9–14.6)
GLOBULIN SER CALC-MCNC: 4.5 G/DL (ref 2.3–3.5)
GLUCOSE SERPL-MCNC: 160 MG/DL (ref 65–100)
HCT VFR BLD AUTO: 35.3 % (ref 35.8–46.3)
HGB BLD-MCNC: 10.4 G/DL (ref 11.7–15.4)
IMM GRANULOCYTES # BLD AUTO: 0 K/UL (ref 0–0.5)
IMM GRANULOCYTES NFR BLD AUTO: 0 % (ref 0–5)
LYMPHOCYTES # BLD: 0.8 K/UL (ref 0.5–4.6)
LYMPHOCYTES NFR BLD: 16 % (ref 13–44)
MAGNESIUM SERPL-MCNC: 1.8 MG/DL (ref 1.8–2.4)
MCH RBC QN AUTO: 24.4 PG (ref 26.1–32.9)
MCHC RBC AUTO-ENTMCNC: 29.5 G/DL (ref 31.4–35)
MCV RBC AUTO: 82.9 FL (ref 79.6–97.8)
MONOCYTES # BLD: 0.3 K/UL (ref 0.1–1.3)
MONOCYTES NFR BLD: 6 % (ref 4–12)
NEUTS SEG # BLD: 3.9 K/UL (ref 1.7–8.2)
NEUTS SEG NFR BLD: 77 % (ref 43–78)
NRBC # BLD: 0 K/UL (ref 0–0.2)
PLATELET # BLD AUTO: 97 K/UL (ref 150–450)
PMV BLD AUTO: 10.2 FL (ref 9.4–12.3)
POTASSIUM SERPL-SCNC: 3.7 MMOL/L (ref 3.5–5.1)
PROT SERPL-MCNC: 7.6 G/DL (ref 6.3–8.2)
RBC # BLD AUTO: 4.26 M/UL (ref 4.05–5.25)
SODIUM SERPL-SCNC: 139 MMOL/L (ref 136–145)
WBC # BLD AUTO: 5.1 K/UL (ref 4.3–11.1)

## 2020-06-05 PROCEDURE — 36591 DRAW BLOOD OFF VENOUS DEVICE: CPT

## 2020-06-05 PROCEDURE — 85025 COMPLETE CBC W/AUTO DIFF WBC: CPT

## 2020-06-05 PROCEDURE — 83735 ASSAY OF MAGNESIUM: CPT

## 2020-06-05 PROCEDURE — 80053 COMPREHEN METABOLIC PANEL: CPT

## 2020-06-10 ENCOUNTER — HOSPITAL ENCOUNTER (OUTPATIENT)
Dept: PHYSICAL THERAPY | Age: 80
Discharge: HOME | End: 2020-06-10
Payer: MEDICARE

## 2020-06-10 PROCEDURE — 92526 ORAL FUNCTION THERAPY: CPT

## 2020-06-10 NOTE — PROGRESS NOTES
Zeny Carpenter  : 1940  [place]  Secondary: Hernán Parvin 13 at [facility], 101 [facility], [address]  [phone]   [phone]        OUTPATIENT SPEECH LANGUAGE PATHOLOGY: Daily Note: 2  ICD-10: Treatment Diagnosis: dysphagia, oropharyngeal R 13.12  REFERRING PHYSICIAN: Joshua Moody NP MD Orders: speech evaluate and treat   PAST MEDICAL HISTORY:    Ms. Loreta Linder is a [de-identified] y.o. female who  has a past medical history of Anxiety, Cancer ([address] Cancer of right breast ([address] (Dx 2019), Crohn's disease ([facility] [address] Heart failure ([address] Hypertension, Psychiatric disorder, and Thyroid disease. She also  has a past surgical history that includes hx hysterectomy; hx lumbar laminectomy; ir insert tunl cvc w port over 5 years (2019); and hx vascular access. MEDICAL/REFERRING DIAGNOSIS: Parkinson's disease [G20]  Dysphagia, unspecified [R13.10]  DATE OF ONSET: 2019  PRIOR LEVEL OF FUNCTION: residing with her niece   PRECAUTIONS/ALLERGIES: Betadine [povidone-iodine]; Pcn [penicillins]; and Pentasa [mesalamine]      ASSESSMENT:  Pt reported her exercises are \"going fine but I didn't get near as many as I hoped\". However, she reported she is doing them daily. Mild-mod cues to exagerrate labial retraction with oral motor exercises. Patient will benefit from skilled intervention to address the below impairments. ?????? ? ? This section established at most recent assessment??????????  PROBLEM LIST (Impairments causing functional limitations):  1. Dysphagia   GOALS: (Goals have been discussed and agreed upon with patient.)  SHORT-TERM FUNCTIONAL GOALS: Time Frame: 3 months   Pt will complete oral motor exercises with 80% accuracy. Pt will complete exercises for bolus hold with 80% accuracy. Pt will complete laryngeal exercises with 80% accuracy. DISCHARGE GOALS: Time Frame: 4-5 months  1.  Pt will tolerate least restrictive diet without signs/sx aspiration 100% for safe swallow function. REHABILITATION POTENTIAL FOR STATED GOALS: GoodPLAN OF CARE:  Patient will benefit from skilled intervention to address the following impairments. RECOMMENDATIONS AND PLANNED INTERVENTIONS (Benefits and precautions of therapy have been discussed with the patient.):  · continue prescribed diet  MEDICATIONS:  · With liquid  · whole in pureed  COMPENSATORY STRATEGIES/MODIFICATIONS INCLUDING:  · Single sips if using straws   OTHER RECOMMENDATIONS (including follow up treatment recommendations):   · Oral motor exercises  · Laryngeal exercises  RECOMMENDED DIET MODIFICATIONS DISCUSSED WITH:  · Family  · Patient  TREATMENT PLAN EFFECTIVE DATES: 5/26/2020 TO 8/26/2020 (90 days). FREQUENCY/DURATION: Continue to follow patient 1 time a week for 90 days to address above goals. Regarding Chana WM Edge's therapy, I certify that the treatment plan above will be carried out by a therapist or under their direction. Thank you for this referral,  Maxi Daley  [address] [address] [facility]                    Referring Physician Signature: Maryanne Muir NP    Date      SUBJECTIVE:  Pt cooperative. Present Symptoms: coughing with po       Current Medications: see hard chart   Date Last Reviewed: 6/9/2020  Current Dietary Status:  [facility] soft      History of reflux:  YES    Reflux medication: Esomeprazole  Social History/Home Situation: residing with her niece       Work/Activity History: retired     OBJECTIVE:  Objective Measure: Tool Used: National Outcomes Measurement System: Functional Communication Measures: SWALLOWING  Score:  Initial: 5 Most Recent: X (Date: -- )   Interpretation of Tool: This measure describes the change in functional communication status subsequent to speech-language pathology treatment of patients with dysphagia.  o Level 1:  Individual is not able to swallow anything safely by mouth.  All nutrition and hydration is received through non-oral means (e.g., nasogastric tube, PEG). o Level 2: Individual is not able to swallow safely by mouth for nutrition and hydration, but may take some consistency with consistent maximal cues in therapy only. Alternative method of feeding required. o Level 3:  Alternative method of feeding required as individual takes less than 50% of nutrition and hydration by mouth, and/or swallowing is safe with consistent use of moderate cues to use compensatory strategies and/or requires maximum diet restriction. o Level 4:  Swallowing is safe, but usually requires moderate cues to use compensatory strategies, and/or the individual has moderate diet restrictions and/or still requires tube feeding and/or oral supplements. o Level 5:  Swallowing is safe with minimal diet restriction and/or occasionally requires minimal cueing to use compensatory strategies. The individual may occasionally self-cue. All nutrition and hydration needs are met by mouth at mealtime. o Level 6:  Swallowing is safe, and the individual eats and drinks independently and may rarely require minimal cueing. The individual usually self-cues when difficulty occurs. May need to avoid specific food items (e.g., popcorn and nuts), or require additional time (due to dysphagia). o Level 7: The individuals ability to eat independently is not limited by swallow function. Swallowing would be safe and efficient for all consistencies. Compensatory strategies are effectively used when needed. Score Level 7 Level 6 Level 5 Level 4 Level 3 Level 2 Level 1   Modifier CH CI CJ CK CL CM CN       Cognitive and Communication Status:  Mental status: alert     TREATMENT:    (In addition to Assessment/Re-Assessment sessions the following treatments were rendered)  Dysphagia Activities: Activities/Procedures listed utilized to improve progress in swallow function and swallow safety. Required minimal cueing to improve swallow safety.       ORAL MOTOR EXERCISES:  Exaggerate Vowels: Yes  Reps: 10(mild-mod cues to over exagerrate with \"a\" and \"e\")                                                                                                                         Lingual Resistance: Yes  Reps : 10(mild-mod weakness with mod tremors)              \"OO-EE\": Yes  Reps: 10(mild-mod decreased ROM with \"ee\")               Roof mouth lick: 3 sets of 10. LARYNGEAL / PHARYNGEAL EXERCISES:           Effortful Swallow: Yes  Reps : 10(in conjunction with bolus hold)                                            Mendelsohn Maneuver: Yes  Reps : 10(90%)                                                                      __________________________________________________________________________________________________  Treatment Assessment:   . Progression/Medical Necessity:   · Skilled intervention continues to be required due to persistent signs and symptoms of aspiration and patient still consuming a modified diet. Compliance with Program/Exercises: Will assess as treatment progresses. Reason for Continuation of Services/Other Comments:  · Patient continues to require skilled intervention due to dysphagia . Recommendations/Intent for next treatment session: \"Treatment next visit will focus on oral motor, laryngeal exercises\".      Total Treatment Duration:  Time In: 1100  Time Out: [address] [facility], MSP, CCC-SLP

## 2020-06-17 ENCOUNTER — HOSPITAL ENCOUNTER (OUTPATIENT)
Dept: PHYSICAL THERAPY | Age: 80
Discharge: HOME | End: 2020-06-17
Payer: MEDICARE

## 2020-06-17 PROCEDURE — 92526 ORAL FUNCTION THERAPY: CPT

## 2020-06-17 NOTE — PROGRESS NOTES
Helder Pal  : 1940  [place]  Secondary: Hernán Melendrez 13 at [address] [facility]  11 [facility], [address] [place] [address], [place], [facility] [address] [facility]  [phone]   [phone]        OUTPATIENT SPEECH LANGUAGE PATHOLOGY: Daily Note: 3  ICD-10: Treatment Diagnosis: dysphagia, oropharyngeal R 13.12  REFERRING PHYSICIAN: Erendira Moody NP MD Orders: speech evaluate and treat   PAST MEDICAL HISTORY:    Ms. Manolo Thacker is a [de-identified] y.o. female who  has a past medical history of Anxiety, Cancer ([facility] [address] Cancer of right breast ([facility] [address] (Dx 2019), Crohn's disease ([facility] [address] Heart failure ([address] Hypertension, Psychiatric disorder, and Thyroid disease. She also  has a past surgical history that includes hx hysterectomy; hx lumbar laminectomy; ir insert tunl cvc w port over 5 years (2019); and hx vascular access. MEDICAL/REFERRING DIAGNOSIS: Parkinson's disease [G20]  Dysphagia, unspecified [R13.10]  DATE OF ONSET: 2019  PRIOR LEVEL OF FUNCTION: residing with her niece   PRECAUTIONS/ALLERGIES: Betadine [povidone-iodine]; Pcn [penicillins]; and Pentasa [mesalamine]      ASSESSMENT:  Pt reported she thought she lost her handout with exercises on it last week. Therefore, she did the ones she could remember. She reported she did end up finding it this morning. She reported she feels the exercises are helping as she can swallow bread better now as it doesn't ball up as badly as it used to. However, she feels she is still coughing a lot with liquids. Pt with coughing x4 with 1st set of effortful swallow with bolus hold. Therefore, had pt discontinue the bolus hold and just swallow hard and fast.  Coughing decreased to once with the 2nd set and 2x with the 3rd set. However, one coughing episode without the bolus hold was stronger, longer lasting and resulted in facial redness. She reported she felt she was coughing more today than she normally does.   Pt also with increased difficulty with swallow initiation with the last 5 reps of the 2nd set of the [place]. With the last set, increased difficulty was noted with the 10th repetition. Therefore, stopped at 10. She reported she felt she was getting fatigued towards the end of those repetitions. Patient will benefit from skilled intervention to address the below impairments. ?????? ? ? This section established at most recent assessment??????????  PROBLEM LIST (Impairments causing functional limitations):  1. Dysphagia   GOALS: (Goals have been discussed and agreed upon with patient.)  SHORT-TERM FUNCTIONAL GOALS: Time Frame: 3 months   Pt will complete oral motor exercises with 80% accuracy. Pt will complete exercises for bolus hold with 80% accuracy. Pt will complete laryngeal exercises with 80% accuracy. DISCHARGE GOALS: Time Frame: 4-5 months  1. Pt will tolerate least restrictive diet without signs/sx aspiration 100% for safe swallow function. REHABILITATION POTENTIAL FOR STATED GOALS: GoodPLAN OF CARE:  Patient will benefit from skilled intervention to address the following impairments. RECOMMENDATIONS AND PLANNED INTERVENTIONS (Benefits and precautions of therapy have been discussed with the patient.):  · continue prescribed diet  MEDICATIONS:  · With liquid  · whole in pureed  COMPENSATORY STRATEGIES/MODIFICATIONS INCLUDING:  · Single sips if using straws   OTHER RECOMMENDATIONS (including follow up treatment recommendations):   · Oral motor exercises  · Laryngeal exercises  RECOMMENDED DIET MODIFICATIONS DISCUSSED WITH:  · Family  · Patient  TREATMENT PLAN EFFECTIVE DATES: 5/26/2020 TO 8/26/2020 (90 days). FREQUENCY/DURATION: Continue to follow patient 1 time a week for 90 days to address above goals. Regarding Chana Edge's therapy, I certify that the treatment plan above will be carried out by a therapist or under their direction.   Thank you for this referral,  ARTURO Bryant, CCC-SLP                    Referring Physician Signature: Jose Miguel Barber NP    Date      SUBJECTIVE:  Pt cooperative. Present Symptoms: coughing with po       Current Medications: see hard chart   Date Last Reviewed: 6/17/2020  Current Dietary Status:  [facility] soft      History of reflux:  YES    Reflux medication: Esomeprazole  Social History/Home Situation: residing with her niece       Work/Activity History: retired     OBJECTIVE:  Objective Measure: Tool Used: National Outcomes Measurement System: Functional Communication Measures: SWALLOWING  Score:  Initial: 5 Most Recent: X (Date: -- )   Interpretation of Tool: This measure describes the change in functional communication status subsequent to speech-language pathology treatment of patients with dysphagia.  o Level 1:  Individual is not able to swallow anything safely by mouth. All nutrition and hydration is received through non-oral means (e.g., nasogastric tube, PEG). o Level 2: Individual is not able to swallow safely by mouth for nutrition and hydration, but may take some consistency with consistent maximal cues in therapy only. Alternative method of feeding required. o Level 3:  Alternative method of feeding required as individual takes less than 50% of nutrition and hydration by mouth, and/or swallowing is safe with consistent use of moderate cues to use compensatory strategies and/or requires maximum diet restriction. o Level 4:  Swallowing is safe, but usually requires moderate cues to use compensatory strategies, and/or the individual has moderate diet restrictions and/or still requires tube feeding and/or oral supplements. o Level 5:  Swallowing is safe with minimal diet restriction and/or occasionally requires minimal cueing to use compensatory strategies. The individual may occasionally self-cue. All nutrition and hydration needs are met by mouth at mealtime.   o Level 6:  Swallowing is safe, and the individual eats and drinks independently and may rarely require minimal cueing. The individual usually self-cues when difficulty occurs. May need to avoid specific food items (e.g., popcorn and nuts), or require additional time (due to dysphagia). o Level 7: The individuals ability to eat independently is not limited by swallow function. Swallowing would be safe and efficient for all consistencies. Compensatory strategies are effectively used when needed. Score Level 7 Level 6 Level 5 Level 4 Level 3 Level 2 Level 1   Modifier CH CI CJ CK CL CM CN       Cognitive and Communication Status:  Mental status: alert     TREATMENT:    (In addition to Assessment/Re-Assessment sessions the following treatments were rendered)  Dysphagia Activities: Activities/Procedures listed utilized to improve progress in swallow function and swallow safety. Required minimal cueing to improve swallow safety. ORAL MOTOR  EXERCISES:  Exaggerate Vowels: Yes  Reps: 15(mild cues to over exaggerrate \"a\" and \"e\")  Sets : 3                                                                                                                      Lingual Resistance: Yes  Reps : 15  Sets : 3           \"OO-EE\": Yes  Reps: 15  Sets : 3            Roof mouth lick: 3 sets of 15. LARYNGEAL / PHARYNGEAL EXERCISES:           Effortful Swallow: Yes  Reps : 15(in conjunction with bolus hold x1)  Sets : 3                                         Mendelsohn Maneuver: Yes  Reps : 10;15(14/15; 15/15 mild-mod difficulty with initiation last 5)  Sets : 3                                                                   __________________________________________________________________________________________________  Treatment Assessment:   . Progression/Medical Necessity:   · Skilled intervention continues to be required due to persistent signs and symptoms of aspiration and patient still consuming a modified diet. Compliance with Program/Exercises: Will assess as treatment progresses.    Reason for Continuation of Services/Other Comments:  · Patient continues to require skilled intervention due to dysphagia . Recommendations/Intent for next treatment session: \"Treatment next visit will focus on oral motor, laryngeal exercises\".      Total Treatment Duration:  Time In: 1100  Time Out: [address], MSP, CCC-SLP

## 2020-06-24 ENCOUNTER — HOSPITAL ENCOUNTER (OUTPATIENT)
Dept: PHYSICAL THERAPY | Age: 80
Discharge: HOME | End: 2020-06-24
Payer: MEDICARE

## 2020-06-24 PROCEDURE — 92526 ORAL FUNCTION THERAPY: CPT

## 2020-06-24 NOTE — PROGRESS NOTES
Anh Grayson  : 1940  [place]  Secondary: Hernán Melendrez 13 at [facility] 68, 101 [facility], [address]  [phone]   [phone]        OUTPATIENT SPEECH LANGUAGE PATHOLOGY: Progress Report  ICD-10: Treatment Diagnosis: dysphagia, oropharyngeal R 13.12  REFERRING PHYSICIAN: Mariely Moody NP MD Orders: speech evaluate and treat   PAST MEDICAL HISTORY:    Ms. Rory Chiu is a [de-identified] y.o. female who  has a past medical history of Anxiety, Cancer ([facility] [address] Cancer of right breast ([facility] [address] (Dx 2019), Crohn's disease ([facility] [address] Heart failure ([facility] [address] Hypertension, Psychiatric disorder, and Thyroid disease. She also  has a past surgical history that includes hx hysterectomy; hx lumbar laminectomy; ir insert tunl cvc w port over 5 years (2019); and hx vascular access. MEDICAL/REFERRING DIAGNOSIS: Parkinson's disease [G20]  Dysphagia, unspecified [R13.10]  DATE OF ONSET: 2019  PRIOR LEVEL OF FUNCTION: residing with her niece   PRECAUTIONS/ALLERGIES: Betadine [povidone-iodine]; Pcn [penicillins]; and Pentasa [mesalamine]      ASSESSMENT:  Pt has attended 5 sessions due to dysphagia. She continues to cough at least once each session with thin liquids. She reported she was eating some watermelon yesterday and the juice from the watermelon made her cough. She also reported she is still coughing with liquids at home also. She does well with her exercises and is complaint with her home exercise program.  Mild cues are needed during completion of the [facility] to ensure she sustains elevation. Recommend continuing with ST. Patient will benefit from skilled intervention to address the below impairments. ?????? ? ? This section established at most recent assessment??????????  PROBLEM LIST (Impairments causing functional limitations):  1.  Dysphagia   GOALS: (Goals have been discussed and agreed upon with patient.)  SHORT-TERM FUNCTIONAL GOALS: Time Frame: 3 months   Pt will complete oral motor exercises with 80% accuracy. Goal met. Change to 90%. Pt will complete exercises for bolus hold with 80% accuracy. Goal met. Change to 90%. Pt will complete laryngeal exercises with 80% accuracy. Goal met. Change to 90%. DISCHARGE GOALS: Time Frame: 4-5 months  1. Pt will tolerate least restrictive diet without signs/sx aspiration 100% for safe swallow function. REHABILITATION POTENTIAL FOR STATED GOALS: GoodPLAN OF CARE:  Patient will benefit from skilled intervention to address the following impairments. RECOMMENDATIONS AND PLANNED INTERVENTIONS (Benefits and precautions of therapy have been discussed with the patient.):  · continue prescribed diet  MEDICATIONS:  · With liquid  · whole in pureed  COMPENSATORY STRATEGIES/MODIFICATIONS INCLUDING:  · Single sips if using straws   OTHER RECOMMENDATIONS (including follow up treatment recommendations):   · Oral motor exercises  · Laryngeal exercises  RECOMMENDED DIET MODIFICATIONS DISCUSSED WITH:  · Family  · Patient  TREATMENT PLAN EFFECTIVE DATES: 5/26/2020 TO 8/26/2020 (90 days). FREQUENCY/DURATION: Continue to follow patient 1 time a week for 90 days to address above goals. Regarding Chana Edge's therapy, I certify that the treatment plan above will be carried out by a therapist or under their direction. Thank you for this referral,  [address] [address] [facility]                    Referring Physician Signature: Katya Benoit NP    Date      SUBJECTIVE:  Pt cooperative. Present Symptoms: coughing with po       Current Medications: see hard chart   Date Last Reviewed: 6/24/2020  Current Dietary Status:  mech soft      History of reflux:  YES    Reflux medication: Esomeprazole  Social History/Home Situation: residing with her niece       Work/Activity History: retired     OBJECTIVE:  Objective Measure:   Tool Used: National Outcomes Measurement System: Functional Communication Measures: SWALLOWING  Score:  Initial: 5 Most Recent: X (Date: -- )   Interpretation of Tool: This measure describes the change in functional communication status subsequent to speech-language pathology treatment of patients with dysphagia.  o Level 1:  Individual is not able to swallow anything safely by mouth. All nutrition and hydration is received through non-oral means (e.g., nasogastric tube, PEG). o Level 2: Individual is not able to swallow safely by mouth for nutrition and hydration, but may take some consistency with consistent maximal cues in therapy only. Alternative method of feeding required. o Level 3:  Alternative method of feeding required as individual takes less than 50% of nutrition and hydration by mouth, and/or swallowing is safe with consistent use of moderate cues to use compensatory strategies and/or requires maximum diet restriction. o Level 4:  Swallowing is safe, but usually requires moderate cues to use compensatory strategies, and/or the individual has moderate diet restrictions and/or still requires tube feeding and/or oral supplements. o Level 5:  Swallowing is safe with minimal diet restriction and/or occasionally requires minimal cueing to use compensatory strategies. The individual may occasionally self-cue. All nutrition and hydration needs are met by mouth at mealtime. o Level 6:  Swallowing is safe, and the individual eats and drinks independently and may rarely require minimal cueing. The individual usually self-cues when difficulty occurs. May need to avoid specific food items (e.g., popcorn and nuts), or require additional time (due to dysphagia). o Level 7: The individuals ability to eat independently is not limited by swallow function. Swallowing would be safe and efficient for all consistencies. Compensatory strategies are effectively used when needed.   Score Level 7 Level 6 Level 5 Level 4 Level 3 Level 2 Level 1   Modifier [facility] CK CL CM CN       Cognitive and Communication Status:  Mental status: alert     TREATMENT:    (In addition to Assessment/Re-Assessment sessions the following treatments were rendered)  Dysphagia Activities: Activities/Procedures listed utilized to improve progress in swallow function and swallow safety. Required minimal cueing to improve swallow safety. ORAL MOTOR  EXERCISES:  Exaggerate Vowels: Yes  Reps: 15(mild cues to exagerrate movements with \"a\")  Sets : 3                                                                                                                      Lingual Resistance: Yes  Reps : 15  Sets : 3           \"OO-EE\": Yes  Reps: 15  Sets : 3            Roof mouth lick: 3 sets of 15. LARYNGEAL / PHARYNGEAL EXERCISES:           Effortful Swallow: Yes  Reps : 15(coughing x1 with the 1st set)  Sets : 3                                         Mendelsohn Maneuver: Yes  Reps : 10(90%; 90%; 90%)  Sets : 3                                                                   __________________________________________________________________________________________________  Treatment Assessment:   . Progression/Medical Necessity:   · Skilled intervention continues to be required due to persistent signs and symptoms of aspiration and patient still consuming a modified diet. Compliance with Program/Exercises: Will assess as treatment progresses. Reason for Continuation of Services/Other Comments:  · Patient continues to require skilled intervention due to dysphagia . Recommendations/Intent for next treatment session: \"Treatment next visit will focus on oral motor, laryngeal exercises\".      Total Treatment Duration:  Time In: 1105  Time Out: ARTURO Zhang, CCC-SLP

## 2020-07-08 ENCOUNTER — HOSPITAL ENCOUNTER (OUTPATIENT)
Dept: PHYSICAL THERAPY | Age: 80
Discharge: HOME | End: 2020-07-08
Payer: MEDICARE

## 2020-07-08 PROCEDURE — 92526 ORAL FUNCTION THERAPY: CPT

## 2020-07-13 ENCOUNTER — HOSPITAL ENCOUNTER (OUTPATIENT)
Dept: PHYSICAL THERAPY | Age: 80
Discharge: HOME | End: 2020-07-13
Payer: MEDICARE

## 2020-07-13 PROCEDURE — 92526 ORAL FUNCTION THERAPY: CPT

## 2020-07-13 NOTE — PROGRESS NOTES
Donovan Agent  : 1940  [place]  Secondary: Hernán Melendrez [address] at [address] [facility] 68, [address] [facility], [place], [facility]  [phone]   [phone]        OUTPATIENT SPEECH LANGUAGE PATHOLOGY: Daily Note: 2  ICD-10: Treatment Diagnosis: dysphagia, oropharyngeal R 13.12  REFERRING PHYSICIAN: Jw Moody NP MD Orders: speech evaluate and treat   PAST MEDICAL HISTORY:    Ms. April Rodriguez is a [de-identified] y.o. female who  has a past medical history of Anxiety, Cancer ([facility] [address] [address] Cancer of right breast ([facility] [address] (Dx 2019), Crohn's disease ([facility] [address] Heart failure ([address] Hypertension, Psychiatric disorder, and Thyroid disease. She also  has a past surgical history that includes hx hysterectomy; hx lumbar laminectomy; ir insert tunl cvc w port over 5 years (2019); and hx vascular access. MEDICAL/REFERRING DIAGNOSIS: Parkinson's disease [G20]  Dysphagia, unspecified [R13.10]  DATE OF ONSET: 2019  PRIOR LEVEL OF FUNCTION: residing with her niece   PRECAUTIONS/ALLERGIES: Betadine [povidone-iodine]; Pcn [penicillins]; and Pentasa [mesalamine]      ASSESSMENT:  Pt reported she hasn't tried the thickener yet as she doesn't like drinking thick medicine. Therefore, she isn't sure if she will like it or not and thought about bringing it back. She reported her niece wants her to try it. Suggested for her to try it vs bringing it back. She reported she would. She reported she feels her swallowing is improving as she is coughing less with liquids. Pt did well with exercises this date. No coughing with water. However, pt only consumed 2 ounces this date vs using the water for each repetition with the effortful swallows. Patient will benefit from skilled intervention to address the below impairments. ?????? ? ? This section established at most recent assessment??????????  PROBLEM LIST (Impairments causing functional limitations):  1. Dysphagia   GOALS: (Goals have been discussed and agreed upon with patient.)  SHORT-TERM FUNCTIONAL GOALS: Time Frame: 3 months   Pt will complete oral motor exercises with 90% accuracy. Pt will complete exercises for bolus hold with 90% accuracy. Pt will complete laryngeal exercises with 90% accuracy. DISCHARGE GOALS: Time Frame: 4-5 months  1. Pt will tolerate least restrictive diet without signs/sx aspiration 100% for safe swallow function. REHABILITATION POTENTIAL FOR STATED GOALS: GoodPLAN OF CARE:  Patient will benefit from skilled intervention to address the following impairments. RECOMMENDATIONS AND PLANNED INTERVENTIONS (Benefits and precautions of therapy have been discussed with the patient.):  · continue prescribed diet  MEDICATIONS:  · With liquid  · whole in pureed  COMPENSATORY STRATEGIES/MODIFICATIONS INCLUDING:  · Single sips if using straws   OTHER RECOMMENDATIONS (including follow up treatment recommendations):   · Oral motor exercises  · Laryngeal exercises  RECOMMENDED DIET MODIFICATIONS DISCUSSED WITH:  · Family  · Patient  TREATMENT PLAN EFFECTIVE DATES: 5/26/2020 TO 8/26/2020 (90 days). FREQUENCY/DURATION: Continue to follow patient 1 time a week for 90 days to address above goals. Regarding Chana WM Edge's therapy, I certify that the treatment plan above will be carried out by a therapist or under their direction. Thank you for this referral,  Elvie Issa, [facility] MED[facility] DEL [facility], [facility] JENS ARZATE [facility]-SLP                    Referring Physician Signature: Jairo Espinal NP    Date      SUBJECTIVE:  Pt arrived late to the session this date. Present Symptoms: coughing with po       Current Medications: see hard chart   Date Last Reviewed: 7/13/2020  Current Dietary Status:  [facility] soft      History of reflux:  YES    Reflux medication: Esomeprazole  Social History/Home Situation: residing with her niece       Work/Activity History: retired     OBJECTIVE:  Objective Measure:   Tool Used: National Outcomes Measurement System: Functional Communication Measures: SWALLOWING  Score:  Initial: 5 Most Recent: X (Date: -- )   Interpretation of Tool: This measure describes the change in functional communication status subsequent to speech-language pathology treatment of patients with dysphagia.  o Level 1:  Individual is not able to swallow anything safely by mouth. All nutrition and hydration is received through non-oral means (e.g., nasogastric tube, PEG). o Level 2: Individual is not able to swallow safely by mouth for nutrition and hydration, but may take some consistency with consistent maximal cues in therapy only. Alternative method of feeding required. o Level 3:  Alternative method of feeding required as individual takes less than 50% of nutrition and hydration by mouth, and/or swallowing is safe with consistent use of moderate cues to use compensatory strategies and/or requires maximum diet restriction. o Level 4:  Swallowing is safe, but usually requires moderate cues to use compensatory strategies, and/or the individual has moderate diet restrictions and/or still requires tube feeding and/or oral supplements. o Level 5:  Swallowing is safe with minimal diet restriction and/or occasionally requires minimal cueing to use compensatory strategies. The individual may occasionally self-cue. All nutrition and hydration needs are met by mouth at mealtime. o Level 6:  Swallowing is safe, and the individual eats and drinks independently and may rarely require minimal cueing. The individual usually self-cues when difficulty occurs. May need to avoid specific food items (e.g., popcorn and nuts), or require additional time (due to dysphagia). o Level 7: The individuals ability to eat independently is not limited by swallow function. Swallowing would be safe and efficient for all consistencies. Compensatory strategies are effectively used when needed.   Score Level 7 Level 6 Level 5 Level 4 Level 3 Level 2 Level 1   Modifier CH CI CJ CK CL CM CN       Cognitive and Communication Status:  Mental status: alert     TREATMENT:    (In addition to Assessment/Re-Assessment sessions the following treatments were rendered)  Dysphagia Activities: Activities/Procedures listed utilized to improve progress in swallow function and swallow safety. Required minimal cueing to improve swallow safety. ORAL MOTOR  EXERCISES:  Exaggerate Vowels: Yes  Reps: 15  Sets : 3                                                                                                                                        \"OO-EE\": Yes  Reps: 15(mildly decreased ROM)  Sets : 3            Roof mouth lick: 3 sets of 15. LARYNGEAL / PHARYNGEAL EXERCISES:           Effortful Swallow: Yes  Reps : 15(only used liquids x3)  Sets : 3                                         Mendelsohn Maneuver: Yes  Reps : 15(14/15; 15/15; 14/15)  Sets : 3                                                                   __________________________________________________________________________________________________  Treatment Assessment:   . Progression/Medical Necessity:   · Skilled intervention continues to be required due to persistent signs and symptoms of aspiration and patient still consuming a modified diet. Compliance with Program/Exercises: Will assess as treatment progresses. Reason for Continuation of Services/Other Comments:  · Patient continues to require skilled intervention due to dysphagia . Recommendations/Intent for next treatment session: \"Treatment next visit will focus on oral motor, laryngeal exercises\".      Total Treatment Duration:  Time In: 1115   Time out: 1200 Dionisio Escalona, [facility], [facility] MARK ARZATE, CCC-SLP

## 2020-07-22 ENCOUNTER — HOSPITAL ENCOUNTER (OUTPATIENT)
Dept: PHYSICAL THERAPY | Age: 80
Discharge: HOME | End: 2020-07-22
Payer: MEDICARE

## 2020-07-29 ENCOUNTER — HOSPITAL ENCOUNTER (OUTPATIENT)
Dept: PHYSICAL THERAPY | Age: 80
Discharge: HOME | End: 2020-07-29
Payer: MEDICARE

## 2020-07-29 PROCEDURE — 92526 ORAL FUNCTION THERAPY: CPT

## 2020-07-29 NOTE — THERAPY DISCHARGE
James Dooley  : 1940  [place]  Secondary: Hernán Melendrez 13 at [address], [address] [place] [address], [place], [facility]  [phone]   [phone]        OUTPATIENT SPEECH LANGUAGE PATHOLOGY: Daily Note and Discharge Summary  ICD-10: Treatment Diagnosis: dysphagia, oropharyngeal R 13.12  REFERRING PHYSICIAN: Lam Mcdaniel NP MD Orders: speech evaluate and treat   PAST MEDICAL HISTORY:    Ms. Jodie Wayne is a 79 y.o. female who  has a past medical history of Anxiety, Cancer ([address] Cancer of right breast ([address] (Dx 2019), Crohn's disease ([address] Heart failure ([address] Hypertension, Psychiatric disorder, and Thyroid disease. She also  has a past surgical history that includes hx hysterectomy; hx lumbar laminectomy; ir insert tunl cvc w port over 5 years (2019); and hx vascular access. MEDICAL/REFERRING DIAGNOSIS: Parkinson's disease [G20]  Dysphagia, unspecified [R13.10]  DATE OF ONSET: 2019  PRIOR LEVEL OF FUNCTION: residing with her niece   PRECAUTIONS/ALLERGIES: Betadine [povidone-iodine]; Pcn [penicillins]; and Pentasa [mesalamine]      ASSESSMENT:  Pt has attended 9 sessions due to dysphagia. She reported this date she feels her coughing has improved as she is coughing less with po. She reported she forgot about the thickener. Therefore, she didn't hasn't tried it. No coughing this date with liquids in therapy. She has met her goals and is independent with her home exercise program.  Therefore, recommend DC at this time. Pt in agreement. ????? ? ? This section established at most recent assessment??????????  PROBLEM LIST (Impairments causing functional limitations):  1. Dysphagia   GOALS: (Goals have been discussed and agreed upon with patient.)  SHORT-TERM FUNCTIONAL GOALS: Time Frame:    Pt will complete oral motor exercises with 90% accuracy. Goal met.     Pt will complete exercises for bolus hold with 90% accuracy. Goal met. Pt will complete laryngeal exercises with 90% accuracy. Goal met. DISCHARGE GOALS:   1. Pt will tolerate least restrictive diet without signs/sx aspiration 100% for safe swallow function. Goal met. PLAN OF CARE:  Discharge from Ruthanne Phalen. Thank you for this referral,  ARTURO Perez, CCC-SLP      SUBJECTIVE:  Pt cooperative. Present Symptoms: coughing with po       Current Medications: see hard chart   Date Last Reviewed: 7/29/2020  Current Dietary Status:  [facility] soft      History of reflux:  YES    Reflux medication: Esomeprazole  Social History/Home Situation: residing with her niece       Work/Activity History: retired     OBJECTIVE:  Objective Measure: Tool Used: National Outcomes Measurement System: Functional Communication Measures: SWALLOWING  Score:  Initial: 5 Most Recent: 6 (Date: 7/29/2020 )   Interpretation of Tool: This measure describes the change in functional communication status subsequent to speech-language pathology treatment of patients with dysphagia.  o Level 1:  Individual is not able to swallow anything safely by mouth. All nutrition and hydration is received through non-oral means (e.g., nasogastric tube, PEG). o Level 2: Individual is not able to swallow safely by mouth for nutrition and hydration, but may take some consistency with consistent maximal cues in therapy only. Alternative method of feeding required. o Level 3:  Alternative method of feeding required as individual takes less than 50% of nutrition and hydration by mouth, and/or swallowing is safe with consistent use of moderate cues to use compensatory strategies and/or requires maximum diet restriction. o Level 4:  Swallowing is safe, but usually requires moderate cues to use compensatory strategies, and/or the individual has moderate diet restrictions and/or still requires tube feeding and/or oral supplements.   o Level 5:  Swallowing is safe with minimal diet restriction and/or occasionally requires minimal cueing to use compensatory strategies. The individual may occasionally self-cue. All nutrition and hydration needs are met by mouth at mealtime. o Level 6:  Swallowing is safe, and the individual eats and drinks independently and may rarely require minimal cueing. The individual usually self-cues when difficulty occurs. May need to avoid specific food items (e.g., popcorn and nuts), or require additional time (due to dysphagia). o Level 7: The individuals ability to eat independently is not limited by swallow function. Swallowing would be safe and efficient for all consistencies. Compensatory strategies are effectively used when needed. Score Level 7 Level 6 Level 5 Level 4 Level 3 Level 2 Level 1   Modifier CH CI CJ CK CL CM CN       Cognitive and Communication Status:  Mental status: alert     TREATMENT:    (In addition to Assessment/Re-Assessment sessions the following treatments were rendered)  Dysphagia Activities: Activities/Procedures listed utilized to improve progress in swallow function and swallow safety. Required minima; cueing to improve swallow safety. ORAL MOTOR  EXERCISES:  Exaggerate Vowels: Yes  Reps: 15  Sets : 3                                                                                                                      Lingual Resistance: Yes  Reps : 15  Sets : 3           \"OO-EE\": Yes  Reps: 15  Sets : 3                LARYNGEAL / PHARYNGEAL EXERCISES:           Effortful Swallow: Yes  Reps : 15  Sets : 3                                         Mendelsohn Maneuver: Yes  Reps : 15(14/15; 14/15; 14/15)  Sets : 3                                                                   __________________________________________________________________________________________________  Treatment Assessment:   .     Total Treatment Duration:  Time In: 1120  Time Out: 1200 Dionisio [place], MSP, CCC-SLP

## 2020-07-31 ENCOUNTER — HOSPITAL ENCOUNTER (OUTPATIENT)
Dept: INFUSION THERAPY | Age: 80
Discharge: HOME | End: 2020-07-31
Payer: MEDICARE

## 2020-07-31 DIAGNOSIS — Z17.0 MALIGNANT NEOPLASM OF RIGHT BREAST IN FEMALE, ESTROGEN RECEPTOR POSITIVE, UNSPECIFIED SITE OF BREAST (HCC): ICD-10-CM

## 2020-07-31 DIAGNOSIS — C50.911 MALIGNANT NEOPLASM OF RIGHT BREAST IN FEMALE, ESTROGEN RECEPTOR POSITIVE, UNSPECIFIED SITE OF BREAST (HCC): ICD-10-CM

## 2020-07-31 LAB
ALBUMIN SERPL-MCNC: 3 G/DL (ref 3.2–4.6)
ALBUMIN/GLOB SERPL: 0.7 {RATIO} (ref 1.2–3.5)
ALP SERPL-CCNC: 216 U/L (ref 50–136)
ALT SERPL-CCNC: 15 U/L (ref 12–65)
ANION GAP SERPL CALC-SCNC: 5 MMOL/L (ref 7–16)
AST SERPL-CCNC: 59 U/L (ref 15–37)
BASOPHILS # BLD: 0 K/UL (ref 0–0.2)
BASOPHILS NFR BLD: 1 % (ref 0–2)
BILIRUB SERPL-MCNC: 0.8 MG/DL (ref 0.2–1.1)
BUN SERPL-MCNC: 16 MG/DL (ref 8–23)
CALCIUM SERPL-MCNC: 9.4 MG/DL (ref 8.3–10.4)
CHLORIDE SERPL-SCNC: 110 MMOL/L (ref 98–107)
CO2 SERPL-SCNC: 26 MMOL/L (ref 21–32)
CREAT SERPL-MCNC: 0.8 MG/DL (ref 0.6–1)
DIFFERENTIAL METHOD BLD: ABNORMAL
EOSINOPHIL # BLD: 0.1 K/UL (ref 0–0.8)
EOSINOPHIL NFR BLD: 2 % (ref 0.5–7.8)
ERYTHROCYTE [DISTWIDTH] IN BLOOD BY AUTOMATED COUNT: 21.4 % (ref 11.9–14.6)
GLOBULIN SER CALC-MCNC: 4.5 G/DL (ref 2.3–3.5)
GLUCOSE SERPL-MCNC: 117 MG/DL (ref 65–100)
HCT VFR BLD AUTO: 34.6 % (ref 35.8–46.3)
HGB BLD-MCNC: 10.7 G/DL (ref 11.7–15.4)
IMM GRANULOCYTES # BLD AUTO: 0 K/UL (ref 0–0.5)
IMM GRANULOCYTES NFR BLD AUTO: 0 % (ref 0–5)
LYMPHOCYTES # BLD: 1.2 K/UL (ref 0.5–4.6)
LYMPHOCYTES NFR BLD: 29 % (ref 13–44)
MCH RBC QN AUTO: 25.7 PG (ref 26.1–32.9)
MCHC RBC AUTO-ENTMCNC: 30.9 G/DL (ref 31.4–35)
MCV RBC AUTO: 83.2 FL (ref 79.6–97.8)
MONOCYTES # BLD: 0.3 K/UL (ref 0.1–1.3)
MONOCYTES NFR BLD: 6 % (ref 4–12)
NEUTS SEG # BLD: 2.7 K/UL (ref 1.7–8.2)
NEUTS SEG NFR BLD: 62 % (ref 43–78)
NRBC # BLD: 0 K/UL (ref 0–0.2)
PLATELET # BLD AUTO: 95 K/UL (ref 150–450)
PMV BLD AUTO: 10 FL (ref 9.4–12.3)
POTASSIUM SERPL-SCNC: 3.8 MMOL/L (ref 3.5–5.1)
PROT SERPL-MCNC: 7.5 G/DL (ref 6.3–8.2)
RBC # BLD AUTO: 4.16 M/UL (ref 4.05–5.25)
SODIUM SERPL-SCNC: 141 MMOL/L (ref 136–145)
WBC # BLD AUTO: 4.3 K/UL (ref 4.3–11.1)

## 2020-07-31 PROCEDURE — 36591 DRAW BLOOD OFF VENOUS DEVICE: CPT

## 2020-07-31 PROCEDURE — 80053 COMPREHEN METABOLIC PANEL: CPT

## 2020-07-31 PROCEDURE — 85025 COMPLETE CBC W/AUTO DIFF WBC: CPT

## 2020-07-31 RX ORDER — SODIUM CHLORIDE 0.9 % (FLUSH) 0.9 %
5-10 SYRINGE (ML) INJECTION ONCE
Status: COMPLETED | OUTPATIENT
Start: 2020-07-31 | End: 2020-07-31

## 2020-07-31 RX ADMIN — Medication 10 ML: at 11:14

## 2020-08-07 ENCOUNTER — HOSPITAL ENCOUNTER (OUTPATIENT)
Dept: INTERVENTIONAL RADIOLOGY/VASCULAR | Age: 80
Discharge: HOME | End: 2020-08-07
Attending: INTERNAL MEDICINE
Payer: MEDICARE

## 2020-08-07 VITALS
BODY MASS INDEX: 23.78 KG/M2 | HEIGHT: 66 IN | HEART RATE: 77 BPM | RESPIRATION RATE: 18 BRPM | TEMPERATURE: 98.4 F | WEIGHT: 148 LBS | SYSTOLIC BLOOD PRESSURE: 166 MMHG | DIASTOLIC BLOOD PRESSURE: 74 MMHG | OXYGEN SATURATION: 96 %

## 2020-08-07 DIAGNOSIS — C50.911 MALIGNANT NEOPLASM OF RIGHT BREAST IN FEMALE, ESTROGEN RECEPTOR POSITIVE, UNSPECIFIED SITE OF BREAST (HCC): ICD-10-CM

## 2020-08-07 DIAGNOSIS — Z17.0 MALIGNANT NEOPLASM OF RIGHT BREAST IN FEMALE, ESTROGEN RECEPTOR POSITIVE, UNSPECIFIED SITE OF BREAST (HCC): ICD-10-CM

## 2020-08-07 PROCEDURE — 36590 REMOVAL TUNNELED CV CATH: CPT

## 2020-08-07 PROCEDURE — 77030031139 HC SUT VCRL2 J&J -A

## 2020-08-07 PROCEDURE — 74011000250 HC RX REV CODE- 250: Performed by: PHYSICIAN ASSISTANT

## 2020-08-07 PROCEDURE — 77030010507 HC ADH SKN DERMBND J&J -B

## 2020-08-07 PROCEDURE — 77030031131 HC SUT MXN P COVD -B

## 2020-08-07 RX ORDER — LIDOCAINE HYDROCHLORIDE AND EPINEPHRINE 20; 10 MG/ML; UG/ML
20-200 INJECTION, SOLUTION INFILTRATION; PERINEURAL ONCE
Status: COMPLETED | OUTPATIENT
Start: 2020-08-07 | End: 2020-08-07

## 2020-08-07 RX ADMIN — LIDOCAINE HYDROCHLORIDE AND EPINEPHRINE 160 MG: 20; 10 INJECTION, SOLUTION INFILTRATION; PERINEURAL at 12:21

## 2020-08-07 NOTE — PROGRESS NOTES
Recovery period without difficulty. Pt alert and oriented and denies pain. Dressing is clean, dry, and intact. Reviewed discharge instructions with patient and daughter, both verbalized understanding.

## 2020-08-07 NOTE — DISCHARGE INSTRUCTIONS
[address]  Department of Interventional Radiology  [facility]  [phone] Office  [phone] Fax    DRAIN/PORT/CATHETER REMOVAL  DISCHARGE INSTRUCTIONS    General Information:     Your doctor has ordered for us to remove your drain, port, or catheter. This could be that you do not need it anymore, it is not doing its job, your physician has decided on another plan for your treatment and/or it may need replacing. Home Care Instructions: You can resume your regular diet and medication regimen. Do not drink alcohol, drive, or make any important legal decisions in the next 24 hours. Do not lift anything heavier than a gallon of milk, or do anything strenuous for the next 24 hours. You will notice a dressing over the site of the removal. This dressing should stay in place until the site is healed. The dressing should be changed at least every 48 hours. You should change the dressing sooner if it becomes soiled or wet. The nurse who discharges you to home should review with you any wound care instructions. Resume your normal level of activity slowly. You may shower after 24 hours, but do not take a bath, swim or immerse yourself in water until the site has healed, and keep the dressing dry with plastic wrap while showering. The site may ooze for a couple of days. This drainage should lessen with each passing day. Call If:     You should call your Physician and/or the Radiology Nurse if you have any bleeding other than a small spot on your bandage. Call if you have any signs of infection, fever, or increased pain at the site of the tube. Call if the oozing increases, if it changes color, or begins to have an odor. Follow-Up Instructions: Please see your ordering doctor as he/she has requested. To Reach Us: If you have any questions about your procedure, please call the Interventional Radiology department at [phone]. After business hours (5pm) and weekends, call the answering service at [phone] and ask for the Radiologist on call to be paged. Si tiene Preguntas acerca del procedimiento, por favor llame al departamento de Radiología Intervencional al [phone]. Después de horas de oficina (5 pm) y los fines de [place], llamar al Manus Slice Joseline al [phone] y pregunte por el Radiologo de Yumiko Magallanes. Interventional Radiology General Nurse Discharge    After general anesthesia or intravenous sedation, for 24 hours or while taking prescription Narcotics:  · Limit your activities  · Do not drive and operate hazardous machinery  · Do not make important personal or business decisions  · Do  not drink alcoholic beverages  · If you have not urinated within 8 hours after discharge, please contact your surgeon on call. * Please give a list of your current medications to your Primary Care Provider. * Please update this list whenever your medications are discontinued, doses are     changed, or new medications (including over-the-counter products) are added. * Please carry medication information at all times in case of emergency situations. These are general instructions for a healthy lifestyle:    No smoking/ No tobacco products/ Avoid exposure to second hand smoke  Surgeon General's Warning:  Quitting smoking now greatly reduces serious risk to your health. Obesity, smoking, and sedentary lifestyle greatly increases your risk for illness  A healthy diet, regular physical exercise & weight monitoring are important for maintaining a healthy lifestyle    You may be retaining fluid if you have a history of heart failure or if you experience any of the following symptoms:  Weight gain of 3 pounds or more overnight or 5 pounds in a week, increased swelling in our hands or feet or shortness of breath while lying flat in bed.   Please call your doctor as soon as you notice any of these symptoms; do not wait until your next office visit. Recognize signs and symptoms of STROKE:  F-face looks uneven    A-arms unable to move or move unevenly    S-speech slurred or non-existent    T-time-call 911 as soon as signs and symptoms begin-DO NOT go       Back to bed or wait to see if you get better-TIME IS BRAIN.       Patient Signature:  Date: 8/7/2020  Discharging Nurse: Deandre Carrillo RN

## 2020-10-30 ENCOUNTER — HOSPITAL ENCOUNTER (OUTPATIENT)
Dept: LAB | Age: 80
Discharge: HOME | End: 2020-10-30
Payer: MEDICARE

## 2020-10-30 ENCOUNTER — HOSPITAL ENCOUNTER (OUTPATIENT)
Dept: RADIATION ONCOLOGY | Age: 80
Discharge: HOME | End: 2020-10-30
Payer: MEDICARE

## 2020-10-30 DIAGNOSIS — Z17.0 MALIGNANT NEOPLASM OF RIGHT BREAST IN FEMALE, ESTROGEN RECEPTOR POSITIVE, UNSPECIFIED SITE OF BREAST (HCC): ICD-10-CM

## 2020-10-30 DIAGNOSIS — C50.911 MALIGNANT NEOPLASM OF RIGHT BREAST IN FEMALE, ESTROGEN RECEPTOR POSITIVE, UNSPECIFIED SITE OF BREAST (HCC): ICD-10-CM

## 2020-10-30 LAB
ALBUMIN SERPL-MCNC: 3.2 G/DL (ref 3.2–4.6)
ALBUMIN/GLOB SERPL: 0.7 {RATIO} (ref 1.2–3.5)
ALP SERPL-CCNC: 216 U/L (ref 50–136)
ALT SERPL-CCNC: 9 U/L (ref 12–65)
ANION GAP SERPL CALC-SCNC: 5 MMOL/L (ref 7–16)
AST SERPL-CCNC: 26 U/L (ref 15–37)
BASOPHILS # BLD: 0 K/UL (ref 0–0.2)
BASOPHILS NFR BLD: 1 % (ref 0–2)
BILIRUB SERPL-MCNC: 0.5 MG/DL (ref 0.2–1.1)
BUN SERPL-MCNC: 13 MG/DL (ref 8–23)
CALCIUM SERPL-MCNC: 9.2 MG/DL (ref 8.3–10.4)
CHLORIDE SERPL-SCNC: 112 MMOL/L (ref 98–107)
CO2 SERPL-SCNC: 25 MMOL/L (ref 21–32)
CREAT SERPL-MCNC: 0.9 MG/DL (ref 0.6–1)
DIFFERENTIAL METHOD BLD: ABNORMAL
EOSINOPHIL # BLD: 0.1 K/UL (ref 0–0.8)
EOSINOPHIL NFR BLD: 3 % (ref 0.5–7.8)
ERYTHROCYTE [DISTWIDTH] IN BLOOD BY AUTOMATED COUNT: 15.9 % (ref 11.9–14.6)
GLOBULIN SER CALC-MCNC: 4.4 G/DL (ref 2.3–3.5)
GLUCOSE SERPL-MCNC: 116 MG/DL (ref 65–100)
HCT VFR BLD AUTO: 33.3 % (ref 35.8–46.3)
HGB BLD-MCNC: 10.2 G/DL (ref 11.7–15.4)
IMM GRANULOCYTES # BLD AUTO: 0 K/UL (ref 0–0.5)
IMM GRANULOCYTES NFR BLD AUTO: 0 % (ref 0–5)
LYMPHOCYTES # BLD: 1.4 K/UL (ref 0.5–4.6)
LYMPHOCYTES NFR BLD: 37 % (ref 13–44)
MCH RBC QN AUTO: 26.1 PG (ref 26.1–32.9)
MCHC RBC AUTO-ENTMCNC: 30.6 G/DL (ref 31.4–35)
MCV RBC AUTO: 85.2 FL (ref 79.6–97.8)
MONOCYTES # BLD: 0.3 K/UL (ref 0.1–1.3)
MONOCYTES NFR BLD: 8 % (ref 4–12)
NEUTS SEG # BLD: 2 K/UL (ref 1.7–8.2)
NEUTS SEG NFR BLD: 52 % (ref 43–78)
NRBC # BLD: 0 K/UL (ref 0–0.2)
PLATELET # BLD AUTO: 95 K/UL (ref 150–450)
PMV BLD AUTO: 10 FL (ref 9.4–12.3)
POTASSIUM SERPL-SCNC: 4 MMOL/L (ref 3.5–5.1)
PROT SERPL-MCNC: 7.6 G/DL (ref 6.3–8.2)
RBC # BLD AUTO: 3.91 M/UL (ref 4.05–5.25)
SODIUM SERPL-SCNC: 142 MMOL/L (ref 136–145)
WBC # BLD AUTO: 3.8 K/UL (ref 4.3–11.1)

## 2020-10-30 PROCEDURE — 36415 COLL VENOUS BLD VENIPUNCTURE: CPT

## 2020-10-30 PROCEDURE — 85025 COMPLETE CBC W/AUTO DIFF WBC: CPT

## 2020-10-30 PROCEDURE — 99211 OFF/OP EST MAY X REQ PHY/QHP: CPT

## 2020-10-30 PROCEDURE — 80053 COMPREHEN METABOLIC PANEL: CPT

## 2020-10-30 NOTE — PROGRESS NOTES
Patient: Joy Wayne MRN: [id number]  SSN: [id number]    YOB: 1940  Age: 79 y.o. Sex: female      Documentation:  DIAGNOSIS: invasive ductal carcinoma of the right breast, intermediate grade, %, MD 10%, HER-2 equivocal (2+), but HER-2 positive by FISH, clinical T1c N0, now s/p lumpectomy and SLN biopsy revealing 1.2 cm residual IDC, 0/6 SLN, [id number](sn)     PREVIOUS TREATMENT:    1) neoadjuvant TCH  2) lumpectomy 9/11/2019  3) radiation right breast. Dose: Right breast: 4256 cGy in 16 fractions, Right breast boost: 1000 cGy in 4 fractions. Treatment dates: 10/3/2019 - 10/30/2019.       HISTORY OF PRESENT ILLNESS:  Joy Wayne is a [de-identified] y.o. female seen initially by Dr. Isela Amin at the request of Dr. Taylor Taylor. She has a medical history significant for anemia, anxiety, Crohn's disease, GERD, depression, melanoma, hypertension, hypothyroidism, hyperlipidemia. She was found to have an abnormality in the right breast on routine screening mammogram. She has a remote history of breast biopsy with benign finding. Right breast diagnostic mammogram and ultrasound on 03/18/19 revealed spiculated 1.5 cm mass in the upper inner quadrant of the right breast.  Ultrasound revealed an irregular, hypoechoic, approximately 1.5 cm mass at the 2 o'clock position in the right breast 3 cm from the nipple corresponding to mammographic abnormality. This was felt to be highly suggestive of malignancy, BI-RADS 5. Biopsy on 03/29/19 revealed invasive ductal carcinoma, grade 2, %, MD 10%, HER-2 equivocal (2+), but HER-2 positive by FISH. She discussed management options with Dr. Taylor Taylor and was recommended to undergo chemotherapy given her HER-2 positive status. He recommended neoadjuvant TCH chemotherapy. MRI of the breasts on 04/24/19 showed a 1.8 x 1.6 x 2 cm spiculated appearing enhancing mass underlying the right breast skin marker consistent with the patient's known IDC.  There were no other areas of abnormality in either breast.  There was no evidence of lymphadenopathy.      Dr. Prabhu Romero met with her on 04/25/19 to discuss the potential role of radiation therapy in management of this stage I HER-2 positive right breast cancer. We discussed that frequently radiation therapy can be omitted from [facility] in women over the age of 79. However, because Ms. Rodríguez Valladares has a HER2 positive breast cancer required chemotherapy I felt somewhat hesitant to omit radiation therapy. We discussed that if she had a pathologic CR to chemotherapy, then omission of radiation therapy would be reasonable. However, if she had considerable residual disease or if she was unable to tolerate chemotherapy, then I would recommend proceeding with adjuvant radiation therapy.      US after cycle 3 showed significant MD. She completed 6 cycles of [facility] chemotherapy. She was then recommended to complete a year of Herceptin. MRI of the breasts on 08/27/19 showed decrease in size of the right medial 2:00 breast cancer now measuring 1.0 x 0.8 x 1.4 cm compared to 1.8 x 1.6 x 2.0 cm previously. There was no evidence of lymphadenopathy and no new suspicious findings.      She received an infusion of Herceptin on 09/06/19 and is to receive treatment q 3 weeks.      On 09/11/19 she underwent lumpectomy and sentinel lymph node biopsy. Pathology revealed invasive ductal carcinoma, 12 mm, grade 2. Margins were negative. 0/6 sentinel lymph nodes were involved. Staging was [id number](sn).      TREATMENT COURSE:  Chana Edge did well without complaints. She continued to tolerate radiation therapy without difficulty on week 3. She had a fall at home while going to the bathroom and hit her tailbone and head on the floor. She was a bit sore, but without significant injuries. Week 4 doing well. No complaints.          INTERVAL HISTORY:     01/07/20: Ms Rodríguez Valladares returns 10 weeks out from completing radiation therapy. She tolerated radiation extremely well.  At this time, Ms Rodríguez Valladares is doing well. Reports no breast issues. Now on Arimidex. Good appetite. Energy level at her baseline. No complaints. 04/21/20: Virtual call:  Ms Joanna Rosa is now 6 months out from completing radiation therapy to the right breast. At this time, Ms Joanna Rosa is doing well. She reports no breast issues. Recent mammogram revealed post treatment related changes, no new findings. Recommending right breast mammogram as lumpectomy follow up. No complaints. 10/30/2020 Here today for follow up. 1 year out from radiation therapy. She has been doing well since last seen. She has no new complaints or concerns. Energy is good. No new breast concerns or change. No ROM difficulties. Recent mammogram without signs of malignancy.        OBSTETRIC HISTORY:    Menarche at the age of 11. G2,P0. Oral Contraceptive Pills:  None  Hormone Replacement Therapy:  None     PAST MEDICAL HISTORY:         Past Medical History:   Diagnosis Date    Anxiety      Cancer [facility])       melanoma    Cancer of right breast ([facility] [address] Dx 04/2019    Crohn's disease ([facility] [address]      Hypertension      Psychiatric disorder       anxiety/depression    Thyroid disease           The patient denies history of collagen vascular diseases, pacemaker insertion, prior radiation or prior chemotherapy.      PAST SURGICAL HISTORY:         Past Surgical History:   Procedure Laterality Date    HX HYSTERECTOMY        HX LUMBAR LAMINECTOMY        IR INSERT TUNL CVC W PORT OVER 5 YEARS   4/18/2019         MEDICATIONS:      Current Outpatient Medications:     anastrozole (ARIMIDEX) 1 mg tablet, Take 1 mg by mouth daily. Indications: Hormone Receptor Positive Breast Cancer, Disp: 30 Tab, Rfl: 0    anastrozole (ARIMIDEX) 1 mg tablet, Take 1 mg by mouth daily. , Disp: 90 Tab, Rfl: 3    DISABLED PLACARD (DISABLED PLACARD) DMV, Handicap placard, Disp: 1 Each, Rfl: 0    desvenlafaxine succinate (PRISTIQ) 25 mg ER tablet, TAKE 1 TABLET BY MOUTH EVERY MORNING FOR 1 WEEK, THEN 2 EVERY MORNING, Disp: , Rfl: 0    dronabinol (MARINOL) 5 mg capsule, , Disp: , Rfl:     carbidopa-levodopa (SINEMET)  mg per tablet, Take 1 Tab by mouth three (3) times daily. , Disp: 270 Tab, Rfl: 3    potassium chloride (KLOR-CON) 10 mEq tablet, Take 2 Tabs by mouth two (2) times a day. (Patient taking differently: Take 20 mEq by mouth daily.), Disp: 30 Tab, Rfl: 3    venlafaxine-SR (EFFEXOR-XR) 75 mg capsule, , Disp: , Rfl:     LORazepam (ATIVAN) 1 mg tablet, Take 1.5 mg by mouth three (3) times daily. , Disp: , Rfl:     lidocaine-prilocaine (EMLA) topical cream, Apply  to affected area as needed for Pain. Apply to port site 45-60 minutes before lab appointment, Disp: 30 g, Rfl: 2    venlafaxine-SR (EFFEXOR XR) 150 mg capsule, Take  by mouth daily. , Disp: , Rfl:     amLODIPine (NORVASC) 10 mg tablet, Take  by mouth daily. , Disp: , Rfl:     hydrALAZINE (APRESOLINE) 25 mg tablet, Take 25 mg by mouth four (4) times daily. , Disp: , Rfl:     losartan (COZAAR) 50 mg tablet, Take  by mouth daily. , Disp: , Rfl:     hydroCHLOROthiazide (HYDRODIURIL) 25 mg tablet, Take 25 mg by mouth daily. , Disp: , Rfl:     metoprolol tartrate (LOPRESSOR) 25 mg tablet, Take  by mouth two (2) times a day., Disp: , Rfl:     levothyroxine (SYNTHROID) 75 mcg tablet, Take  by mouth Daily (before breakfast). , Disp: , Rfl:     esomeprazole (NEXIUM) 40 mg capsule, Take  by mouth daily. , Disp: , Rfl:     oxybutynin chloride XL (DITROPAN XL) 10 mg CR tablet, Take 10 mg by mouth daily. , Disp: , Rfl:     melatonin 3 mg tablet, Take 6 mg by mouth nightly., Disp: , Rfl:     OLANZapine (ZYPREXA) 5 mg tablet, Take 5 mg by mouth two (2) times a day., Disp: , Rfl:      ALLERGIES:         Allergies   Allergen Reactions    Betadine [Povidone-Iodine] Hives    Pcn [Penicillins] Rash    Pentasa [Mesalamine] Other (comments)       Fever/chills         SOCIAL HISTORY:   Social History            Socioeconomic History    Marital status:      Spouse name: Not on file    Number of children: Not on file    Years of education: Not on file    Highest education level: Not on file   Occupational History    Not on file   Social Needs    Financial resource strain: Not on file    Food insecurity:       Worry: Not on file       Inability: Not on file    Transportation needs:       Medical: Not on file       Non-medical: Not on file   Tobacco Use    Smoking status: Never Smoker    Smokeless tobacco: Never Used   Substance and Sexual Activity    Alcohol use: Never       Frequency: Never    Drug use: Never    Sexual activity: Never   Lifestyle    Physical activity:       Days per week: Not on file       Minutes per session: Not on file    Stress: Not on file   Relationships    Social connections:       Talks on phone: Not on file       Gets together: Not on file       Attends Rastafarian service: Not on file       Active member of club or organization: Not on file       Attends meetings of clubs or organizations: Not on file       Relationship status: Not on file    Intimate partner violence:       Fear of current or ex partner: Not on file       Emotionally abused: Not on file       Physically abused: Not on file       Forced sexual activity: Not on file   Other Topics Concern     Service Not Asked    Blood Transfusions Not Asked    Caffeine Concern Not Asked    Occupational Exposure Not Asked    Hobby Hazards Not Asked    Sleep Concern Not Asked    Stress Concern Not Asked    Weight Concern Not Asked    Special Diet Not Asked    Back Care Not Asked    Exercise Not Asked    Bike Helmet Not Asked    Seat Belt Not Asked    Self-Exams Not Asked   Social History Narrative    Not on file         FAMILY HISTORY:         Family History   Problem Relation Age of Onset    Cancer Sister           melanoma    Cancer Brother           lung         REVIEW OF SYSTEMS: Please see interval history       PHYSICAL EXAMINATION:   ECOG Performance status 0  VITAL SIGNS: Virtual visit      GENERAL:Appears well. Alert and awake, sitting on her couch. Respiratory effort normal. No visualized signs of difficulty breathing or respiratory distress. PATHOLOGY:  3/29/2019    RADIOLOGY:        08/27/19: MRI of the Breasts with and without contrast     CLINICAL INDICATION:  Restaging of right medial 2:00 breast cancer after  chemotherapy, no new problems, remote right benign surgical biopsy, personal  history of melanoma, right breast biopsy on 3/29/2019 at an outside facility in  [facility] demonstrating invasive ductal carcinoma at 2:00     COMPARISON: MRI 4/24/2019; mammography and ultrasound 3/29/2019, 3/18/2019     FINDINGS: The breasts demonstrate moderate background glandularity and minimal  enhancement.     Right biopsy clip artifact is again seen at 2:00 middle depth within an  irregular heterogeneously enhancing mass measuring up to 1.0 x 0.8 x 1.4 cm  (previously 1.8 x 1.6 x 2.0 cm). There is preservation of normal fat between the  mass and the overlying skin.     There is no other evidence of suspicious enhancing mass or nonmass enhancement  to suggest additional malignancy in either breast.     There is no evidence of axillary or internal mammary lymphadenopathy. Chest wall  signal is normal. Elsewhere, limited visualization of the partially included  thorax and upper abdomen shows no concerning findings. Portacatheter is  partially seen on the left.     IMPRESSION:   1. Decreased size of right medial 2:00 breast cancer after chemotherapy. 2. No new or suspicious finding otherwise.     Recommend continued management as clinically directed. Annual bilateral  mammogram will be due in February 2020.     BI-RADS Assessment Category 6: Known Biopsy Proven Malignancy        04/24/2019: Mri Breast Bi W Wo Cont  MRI BILATERAL BREASTS WITHOUT AND WITH CONTRAST, 4/24/2019. CLINICAL HISTORY:  New diagnosis of right breast IDC. Technique: Multiplanar multisequence imaging of the breast were performed prior to and following the uneventful intravenous administration of 18 mL of Dotarem. Postcontrast imaging was performed using a dynamic technique. Images were reviewed using the [email or web address]. Sequences obtained: Sagittal T2, axial STIR, axial T1, and axial fat-saturated T1-weighted images prior to and following administration of contrast. Comparison studies: Bilateral mammograms 2/13/2019. FINDINGS: A skin marker has been placed at the 3:00 position of the right breast located 2.5 cm from the nipple marking the patient's biopsy site. The breasts are composed of heterogeneous fibroglandular elements. No clearly enlarged or dysmorphic appearing right axillary lymph node is seen. Left axillary lymph nodes appear nonspecific as well. . No enlarged internal mammary lymph nodes are seen. Evaluation of the lungs is limited by  MRI imaging. However, no obvious pulmonary masses are seen. No significant pleural effusions are seen. The liver is obscured by cardiac motion artifact and only partially visualized. However, no focal signal abnormalities are seen prior to, or following administration of contrast to suggest a possible hepatic lesion. Following the administration of intravenous contrast, normal enhancement is seen of the heart and vascular structures of the breasts. Minimal background physiologic enhancement is seen of the breasts. Underlying the right breast skin marker, there is a 1.8 cm AP by 1.6 cm wide by 2 cm craniocaudal spiculated appearing enhancing mass demonstrating a central biopsy defect consistent with the patient's known right breast IDC. No additional enhancing masses are seen to suggest potential multifocal or multicentric disease. No evidence of skin thickening, or nipple retraction is seen.  No enhancing osseous lesion is seen.      IMPRESSION: 1.  1.8 cm x 1.6 cm x 2 cm spiculated appearing enhancing mass underlying the right breast skin marker as described above consistent with the patient's known IDC. No additional concerning enhancement is seen to suggest potential multifocal or multicentric disease. 2. No evidence for metastatic disease in the visualized chest. BI-RADS Assessment Category 6: Known Biopsy Proven Malignancy       4/16/2020: Mammogram at [address]: Parenchyma is heterogeneously dense. Post lumpectomy and post radiation therapy changes in the right breast are identified. IN the left breast there is no dominant mass lesion or significant calcificatins there is no significant new finding since the previous examination. Recommend right mammogram in 6 months. Lumpectomy follow up        IMPRESSION:  Josie Andrews is a [de-identified] y.o. female with invasive ductal carcinoma of the right breast, intermediate grade, %, [place] 10%, HER-2 equivocal (2+), but HER-2 positive by FISH, clinical T1c N0, now s/p lumpectomy and SLN biopsy revealing 1.2 cm residual IDC, 0/6 SLN, [id number](sn). She was treated with 4256 cGy to the whole breast in 16 fractions of 266 cGy each followed by a boost to the lumpectomy cavity of 1000 cGy in 4 fractions 10/3/2019 - 10/30/2019.      Ms Julianne Martinez is now 12 months out from completing radiation therapy to right breast. She is recovering as anticipated from treatment. PLAN:  1) Recent mammogram reviewed with Ms Julianne Martinez and Rayray Joe. Due for repeat mammogram in April of 2021. . Will fax script to  Arvind Burnett per patient request  2) Continue to lotion. Continue SBE  3) RTC after mammogram in 6 months. If doing well, will most likely sign off from her care. 4) Endocrine therapy managed by medical oncology    All questions were answered to the best of my ability.     I appreciate the opportunity to participate in Ms. Edge's care.               [address] [facility], [address] [place] [address] 100, [facility]  Office : [phone]  Fax : [phone] Portions of this note were copied from prior encounters and reviewed for accuracy, currency, and represent documentation and tasks completed during this encounter. I verify and attest these portions to be unchanged from prior visits.

## 2021-02-21 PROBLEM — R35.1 NOCTURIA MORE THAN TWICE PER NIGHT: Status: ACTIVE | Noted: 2021-02-21

## 2021-02-21 PROBLEM — R42 VERTIGO: Status: ACTIVE | Noted: 2021-02-21

## 2021-02-21 PROBLEM — R55 SYNCOPE: Status: ACTIVE | Noted: 2021-02-21

## 2021-03-04 ENCOUNTER — HOSPITAL ENCOUNTER (OUTPATIENT)
Dept: PHYSICAL THERAPY | Age: 81
Discharge: HOME | End: 2021-03-04
Attending: NURSE PRACTITIONER
Payer: MEDICARE

## 2021-03-04 DIAGNOSIS — G20 PARKINSON DISEASE (HCC): ICD-10-CM

## 2021-03-04 PROCEDURE — 97162 PT EVAL MOD COMPLEX 30 MIN: CPT

## 2021-03-04 PROCEDURE — 97110 THERAPEUTIC EXERCISES: CPT

## 2021-03-04 NOTE — PROGRESS NOTES
Asaf Rocha  : 1940  [place]  Secondary: Hernán Melendrez [address] at [address], [address] [place] [address],[address], [address] [place] [address]  [phone]   [phone]        OUTPATIENT PHYSICAL THERAPY: Daily Treatment Note 3/4/2021  Visit Count:  1    ICD-10: Treatment Diagnosis:    Other abnormalities of gait and mobility (R26.89)   Difficulty in walking, not elsewhere classified (R26.2)   Precautions/Allergies:   Betadine [povidone-iodine], Pcn [penicillins], and Pentasa [mesalamine]   TREATMENT PLAN:  Effective Dates: 3/4/2021 TO 2021 (90 days). Frequency/Duration: 2 times a week for 90 Day(s)    Pre-treatment Symptoms/Complaints:  3/4/2021: Patient reports she is doing well today, but has not kept up with her exercises. Pain: Initial: Pain Intensity 1: 0  Post Session:  0/10   Medications Last Reviewed:  3/4/2021  Updated Objective Findings:  See evaluation note from today  TREATMENT:     THERAPEUTIC EXERCISE: (15 minutes):  Exercises per grid below to improve mobility, strength, balance and coordination. Required minimal verbal and manual cues to promote proper body alignment, promote proper body posture and promote proper body mechanics. Progressed resistance, range, repetitions and complexity of movement as indicated. Date:  3/4/2021   Activity/Exercise Parameters   Marching in place 10 reps  2 sets  2 times per day  HEP   Standing heel raises 10 reps  2 sets  2 times per day  HEP      Treatment/Session Summary:    · Response to Treatment:  Patient tolerated assessment without complaints of increased imbalance or fatigue. Patient verbalized and demonstrated understanding of HEP. · Communication/Consultation:  None today  · Equipment provided today:  None today  · Recommendations/Intent for next treatment session: Next visit will focus on improving overall mobility, safety, and balance.     Total Treatment Billable Duration:  15 minutes + evaluation  PT Patient Time In/Time Out  Time In: 1100  Time Out: [address] [facility], PT    Future Appointments   Date Time Provider Milla Betancur   4/29/2021 11:00 AM Rachelle Hubbard [facility]   4/29/2021 11:30 AM Adiel Prado MD [facility]   4/30/2021 10:30 AM Roux, Darylene Simple, ALBERT [facility]   5/11/2021 10:30 AM Alyson Baxter MD [facility]

## 2021-03-04 NOTE — THERAPY EVALUATION
Rafaela Boone  : 1940  [place]  Secondary: Hernán Melendrez [address] at [place] [address], [place] [address].  [phone]   [phone]          OUTPATIENT PHYSICAL THERAPY:Initial Assessment 3/4/2021   ICD-10: Treatment Diagnosis:    Other abnormalities of gait and mobility (R26.89)   Difficulty in walking, not elsewhere classified (R26.2)   Precautions/Allergies:   Betadine [povidone-iodine], Pcn [penicillins], and Pentasa [mesalamine]   TREATMENT PLAN:  Effective Dates: 3/4/2021 TO 2021 (90 days). Frequency/Duration: 2 times a week for 90 Day(s) MEDICAL/REFERRING DIAGNOSIS:  Parkinson disease ([facility] [address] [G20]   DATE OF ONSET: Chronic  REFERRING PHYSICIAN: Kizzy Enriquez NP MD Orders: Evaluate and Treat  Return MD Appointment: TBD     INITIAL ASSESSMENT:  Ms. Juno Gallo presents with decreased mobility, decreased strength, and decreased balance secondary to degenerative changes. After discussing with patient, she agreed she would benefit from physical therapy to improve above deficits. Please sign this plan of treatment if you concur. Thank you for the opportunity to serve this patient. PROBLEM LIST (Impacting functional limitations):  1. Decreased Strength  2. Decreased ADL/Functional Activities  3. Decreased Transfer Abilities  4. Decreased Ambulation Ability/Technique  5. Decreased Balance  6. Decreased Activity Tolerance INTERVENTIONS PLANNED: (Treatment may consist of any combination of the following)  1. Balance Exercise  2. Gait Training  3. Home Exercise Program (HEP)  4. Neuromuscular Re-education/Strengthening  5. Range of Motion (ROM)  6. Therapeutic Activites  7. Therapeutic Exercise/Strengthening     GOALS: (Goals have been discussed and agreed upon with patient.)  Short-Term Functional Goals: Time Frame: 30 days  1.  Patient will be independent with home exercise program without exacerbation of symptoms or cueing needed. Discharge Goals: Time Frame: 90 days  1. Patient will be independent with all ADLs with minimal fear of falling and loss of balance with daily tasks. 2. Patient will report no fear avoidance with social or recreational activities due to fear of falling. 3. Patient will score greater than or equal to 45/56 on Pride Balance Scale with minimal effect of imbalance on patient's ability to manage every day life activities. 4. Patient will score greater than or equal to 19/24 on Dynamic Gait Index with minimal effect of imbalance on patient's ability to manage every day life activities. OUTCOME MEASURE:   Tool Used: Pride Balance Scale  Score:  Initial: 33/56 Most Recent: X/56 (Date: -- )   Interpretation of Score: Each section is scored on a 0-4 scale, 0 representing the patients inability to perform the task and 4 representing independence. The scores of each section are added together for a total score of 56. The higher the patients score, the more independent the patient is. Any score below 45 indicates increased risk for falls. Tool Used: Dynamic Gait Index  Score:  Initial: 19/24 Most Recent: X/24 (Date: -- )   Interpretation of Score: Each section is scored on a 0-3 scale, 0 representing the patients inability to perform the task and 3 representing independence. The scores of each section are added together for a total score of 24. Any score below 19 indicates increased risk for falls. MEDICAL NECESSITY:   · Patient is expected to demonstrate progress in strength, range of motion, balance, coordination and functional technique to improve safety during daily activities. · Patient demonstrates fair rehab potential due to higher previous functional level. · Skilled intervention continues to be required due to decreased functional mobility.   REASON FOR SERVICES/OTHER COMMENTS:  · Patient continues to demonstrate capacity to improve overall functional mobility which will increase independence and increase safety. Total Duration:  PT Patient Time In/Time Out  Time In: 1100  Time Out: 1145    Rehabilitation Potential For Stated Goals: Good  Regarding Chana Edge's therapy, I certify that the treatment plan above will be carried out by a therapist or under their direction. Thank you for this referral,  Yaz Calderon, PT     Referring Physician Signature: Sowmya Noonan NP _______________________________ Date _____________     PAIN/SUBJECTIVE:   Initial: Pain Intensity 1: 0  Post Session:  0/10   HISTORY:   History of Injury/Illness (Reason for Referral):  Patient reports she is returning to therapy because she is having some dizziness. Her niece reports this is due more to a cardiac issue and losing consciousness. Ms. Candace Fleischer reports that she has been having difficulty moving around as well as maintaining her balance. She reports she fell about two months ago due to the dizziness. She reports that she always tends to fall forward. She reports the dizziness will come on with standing or when she is lying down. She reports that she is doing better to recognize the dizziness and stop what she is doing so she doesn't fall. She reports that she has also not been doing her exercises. She reports that she does get up and move around the house, but does not go outside. She reports she would like to work on improving her overall mobility and strength. Past Medical History/Comorbidities:   Ms. Candace Fleischer  has a past medical history of Anxiety, Cancer ([facility] [address] Cancer of right breast ([facility] [address] (Dx 04/2019), Crohn's disease ([facility] [address] Heart failure ([facility] [address] Hypertension, Psychiatric disorder, and Thyroid disease. Ms. Candace Fleischer  has a past surgical history that includes hx hysterectomy; hx lumbar laminectomy; ir insert tunl cvc w port over 5 years (4/18/2019); hx vascular access; and ir remove tunl cvad w port/pump (8/7/2020).   Social History/Living Environment:   Home Environment: Private residence  Living Alone: No  Support Systems: Family member(s), Friends \ neighbors  Prior Level of Function/Work/Activity:  Lives with nieceBelinda  Dominant Side:         RIGHT  Personal Factors:          Sex:  female        Age:  79 y.o. Ambulatory/Rehab Services H2 Model Falls Risk Assessment   Risk Factors:       (1)  Dizziness/Vertigo       (5)  History of Recent Falls [w/in 3 months] Ability to Rise from Chair:       (1)  Pushes up, successful in one attempt   Falls Prevention Plan:       No modifications necessary   Total: (5 or greater = High Risk): 7   ©2010 [facility] [employer]. All Rights Reserved. [facility] Patent #[id number]. Federal Law prohibits the replication, distribution or use without written permission from [facility] [employer]   Current Medications:       Current Outpatient Medications:     anastrozole (ARIMIDEX) 1 mg tablet, TAKE 1 TABLET DAILY, Disp: 90 Tab, Rfl: 3    carbidopa-levodopa (Sinemet)  mg per tablet, 1.5 tabs TID every 5 hours at 7 am, noon and 7 pm, Disp: 405 Tab, Rfl: 3    carbidopa-levodopa ER (SINEMET CR)  mg per tablet, Take one tab at 9 pm, Disp: 30 Tab, Rfl: 0    PARoxetine CR (PAXIL-CR) 25 mg tablet, TAKE 1 TABLET BY MOUTH EVERY DAY, Disp: , Rfl:     levomefolate calcium (DEPLIN PO), Take 7.5 mg by mouth daily. , Disp: , Rfl:     cholecalciferol (VITAMIN D3) (2,000 UNITS /50 MCG) cap capsule, Take  by mouth two (2) times a day., Disp: , Rfl:     DISABLED PLACARD (DISABLED PLACARD) DMV, Handicap placard, Disp: 1 Each, Rfl: 0    potassium chloride (KLOR-CON) 10 mEq tablet, Take 2 Tabs by mouth two (2) times a day. (Patient taking differently: Take 10 mEq by mouth daily.), Disp: 30 Tab, Rfl: 3    LORazepam (ATIVAN) 1 mg tablet, Take 1.5 mg by mouth three (3) times daily. , Disp: , Rfl:     amLODIPine (NORVASC) 10 mg tablet, Take 5 mg by mouth daily. , Disp: , Rfl:     losartan (COZAAR) 50 mg tablet, Take 25 mg by mouth daily. , Disp: , Rfl:     levothyroxine (SYNTHROID) 75 mcg tablet, Take  by mouth Daily (before breakfast). , Disp: , Rfl:     esomeprazole (NEXIUM) 40 mg capsule, Take  by mouth daily. , Disp: , Rfl:     oxybutynin chloride XL (DITROPAN XL) 10 mg CR tablet, Take 10 mg by mouth daily. , Disp: , Rfl:     melatonin 3 mg tablet, Take 6 mg by mouth nightly., Disp: , Rfl:     OLANZapine (ZYPREXA) 5 mg tablet, Take 5 mg by mouth two (2) times a day., Disp: , Rfl:    Date Last Reviewed:  3/4/2021    Number of Personal Factors/Comorbidities that affect the Plan of Care: 1-2: MODERATE COMPLEXITY   EXAMINATION:   Observation/Orthostatic Postural Assessment:          Posture Assessment: Rounded shoulders, Forward head   Functional Mobility:   Gait/Mobility:    Base of Support: Center of gravity altered  Speed/Yamilex: Pace decreased (<100 feet/min)  Step Length: Left shortened, Right shortened  Swing Pattern: Left asymmetrical, Right asymmetrical  Stance: Left decreased, Right decreased  Gait Abnormalities: Antalgic  Ambulation - Level of Assistance: Independent  · Interventions: Verbal cues, Safety awareness training          Transfers:     Sit to Stand: Modified independent  Stand to Sit: Modified independent  Stand Pivot Transfers: Modified independent  Bed to Chair: Modified independent  Lateral Transfers: Modified independent         Bed Mobility:     Rolling: Modified independent  Supine to Sit: Modified independent  Sit to Supine: Modified independent  Scooting: Modified independent       Strength:          UE STRENGTH: 3/5 shoulder flexion, 3/5 shoulder abduction, 3/5 shoulder extension, 3/5 elbow flexion, 3/5 elbow extension. LE STRENGTH:  3/5 hip flexion, 3/5 hip abduction, 3/5 hip adduction, 3/5 hip extension, 3/5 knee extension, 3/5 knee flexion, 2/5 ankle dorsiflexion, 2/5 ankle plantarflexion, 2/5 ankle inversion, 2/5 ankle eversion.   Sensation:         Intact  Postural Control & Balance:  · Pride Balance Scale:  33/56.   (A score less than 45/56 indicates high risk of falls)     · Dynamic Gait Index:  13/24.   (A score less than or equal to19/24 is abnormal and predictive of falls)        Body Structures Involved:  1. Nerves  2. Muscles Body Functions Affected:  1. Neuromusculoskeletal  2. Movement Related Activities and Participation Affected:  1. Mobility  2.  Self Care   Number of elements (examined above) that affect the Plan of Care: 4+: HIGH COMPLEXITY   CLINICAL PRESENTATION:   Presentation: Evolving clinical presentation with changing clinical characteristics: MODERATE COMPLEXITY   CLINICAL DECISION MAKING:   Use of outcome tool(s) and clinical judgement create a POC that gives a: Questionable prediction of patient's progress: MODERATE COMPLEXITY

## 2021-03-11 ENCOUNTER — HOSPITAL ENCOUNTER (OUTPATIENT)
Dept: PHYSICAL THERAPY | Age: 81
Discharge: HOME | End: 2021-03-11
Attending: NURSE PRACTITIONER
Payer: MEDICARE

## 2021-03-11 PROCEDURE — 97110 THERAPEUTIC EXERCISES: CPT

## 2021-03-11 PROCEDURE — 97112 NEUROMUSCULAR REEDUCATION: CPT

## 2021-03-11 NOTE — PROGRESS NOTES
Zulema Chicas  : 1940  [place]  Secondary: Hernán Melendrez [address] at [place] [id number] [facility], [address] [place] [address],[address], [address] [facility]  [phone]   [phone]        OUTPATIENT PHYSICAL THERAPY: Daily Treatment Note 3/11/2021  Visit Count:  2    ICD-10: Treatment Diagnosis:    Other abnormalities of gait and mobility (R26.89)   Difficulty in walking, not elsewhere classified (R26.2)   Precautions/Allergies:   Betadine [povidone-iodine], Pcn [penicillins], and Pentasa [mesalamine]   TREATMENT PLAN:  Effective Dates: 3/4/2021 TO 2021 (90 days). Frequency/Duration: 2 times a week for 90 Day(s)    Pre-treatment Symptoms/Complaints:  3/11/2021: no falls. Doing exercises  Pain: Initial: Pain Intensity 1: 0  Post Session:  0/10   Medications Last Reviewed:  3/11/2021  Updated Objective Findings:  None Today  TREATMENT:     THERAPEUTIC EXERCISE: (25 minutes):  Exercises per grid below to improve mobility, strength, balance and coordination. Required minimal verbal and manual cues to promote proper body alignment, promote proper body posture and promote proper body mechanics. Progressed resistance, range, repetitions and complexity of movement as indicated. Date:  3/11/21 Date:   Date:     Activity/Exercise Parameters Parameters Parameters   nustep Level 3 x 8 minutes     Step ups 6 inch step x 10 reps each leg with rail     Sit to stand X 10 reps from mat table with no UE assist       Standing B LE: hip flexion, hip abduction, hip extension, knee flexion, ankle dorsiflexion & plantarflexion X 10 reps each leg, each exercise                            Neuromuscular Re-education (  20 minutes):  Exercise/activities per grid below to improve balance, coordination, kinesthetic sense, posture and proprioception. Required minimal verbal cues to promote static and dynamic balance in standing.   Balance/Vestibular Treatment: Activity   Date  3/11/21 Date Date Date Date Date   Activity/Exercise   Sets/reps/equipment Sets/reps/  equipment Sets/reps/  equipment Sets/reps/  equipment Sets/reps/  equipment Sets/reps/  equipment   Walking with head turns             Walking with head up & down             Step overs     Over 1/2 foam roll x 15 reps fwd and laterally        Step taps     6 inch step x 20 reps fwd and cross        Marching           Sidestepping           Crossovers           [place]           Walking  backwards             Tandem walking           Weaving in/out of cones             Picking up cones             Sports cord             [employer] ball           Figure 8s            Circles right/left           Walking with 360 degree turns           Spirals           Weight shifting:    Left & Right     Blue foams eyes open        Weight shifting: Forward & Backward      Blue foams eyes open        Static Standing Balance             Standing with feet apart     Blue foams eyes open and closed        Standing with feet together             Standing with feet semitandem   Blue foams eyes open and closed        Standing with feet tandem           Single leg stance           X1/X2 Viewing exercises             Hallpike-Ellerbe testing for BPPV (Benign Paroxysmal Positional Vertigo)             Cali-Daroff exercises           Canalith Repositioning treatment/Epley Maneuver  for BPPV (Benign Paroxysmal Positional Vertigo)           Smart Equit[employer] Training: See scanned report. Treatment/Session Summary:    · Response to Treatment:  Patient tolerated session well. Patient tends to lose her balance more to L with standing activities, but demonstrated improved stability with practice. Patient reported dizziness one time during session, and resolved with seated rest break. Continue to progress as tolerated.    · Communication/Consultation:  None today  · Equipment provided today:  None today  · Recommendations/Intent for next treatment session: Next visit will focus on improving overall mobility, safety, and balance.     Total Treatment Billable Duration:  45 minutes   PT Patient Time In/Time Out  Time In: 1013  Time Out: [address], PT    Future Appointments   Date Time Provider Milla Betancur   3/16/2021 10:15 AM Chandler White, PT [facility]   3/23/2021  1:00 PM Cristina Cline, PT [facility]   4/29/2021 11:00 AM [address] [place] [address] [employer] [facility]   4/29/2021 11:30 AM Alice Hilario MD [facility]   4/30/2021 10:30 AM Joshua Moody, NP Lorrie Kramer [address]   5/11/2021 10:30 AM Vernon Baxter MD [facility]

## 2021-03-16 ENCOUNTER — HOSPITAL ENCOUNTER (OUTPATIENT)
Dept: PHYSICAL THERAPY | Age: 81
Discharge: HOME | End: 2021-03-16
Attending: NURSE PRACTITIONER
Payer: MEDICARE

## 2021-03-16 PROCEDURE — 97112 NEUROMUSCULAR REEDUCATION: CPT

## 2021-03-16 PROCEDURE — 97110 THERAPEUTIC EXERCISES: CPT

## 2021-03-16 NOTE — PROGRESS NOTES
Mabel Hall  : 1940  [place]  Secondary: Hernán Melendrez 13 at [address], [address] [place] [address],[address], [facility].  [phone]   [phone]        OUTPATIENT PHYSICAL THERAPY: Daily Treatment Note 3/16/2021  Visit Count:  3    ICD-10: Treatment Diagnosis:    Other abnormalities of gait and mobility (R26.89)   Difficulty in walking, not elsewhere classified (R26.2)   Precautions/Allergies:   Betadine [povidone-iodine], Pcn [penicillins], and Pentasa [mesalamine]   TREATMENT PLAN:  Effective Dates: 3/4/2021 TO 2021 (90 days). Frequency/Duration: 2 times a week for 90 Day(s)    Pre-treatment Symptoms/Complaints:  3/16/2021: No falls. Doing pretty well. Pain: Initial: Pain Intensity 1: 0 0 Post Session:  0/10   Medications Last Reviewed:  3/16/2021  Updated Objective Findings:  None Today  TREATMENT:     THERAPEUTIC EXERCISE: (25 minutes):  Exercises per grid below to improve mobility, strength, balance and coordination. Required minimal verbal and manual cues to promote proper body alignment, promote proper body posture and promote proper body mechanics. Progressed resistance, range, repetitions and complexity of movement as indicated.      Date:  3/11/21 Date:  3/16/21 Date:     Activity/Exercise Parameters Parameters Parameters   nustep Level 3 x 8 minutes Level 3 x 8 minutes    Step ups 6 inch step x 10 reps each leg with rail 6 inch step x 10 reps each leg with rail    Sit to stand X 10 reps from mat table with no UE assist X 10 reps from chair with no UE assist      Standing B LE: hip flexion, hip abduction, hip extension, knee flexion, ankle dorsiflexion & plantarflexion X 10 reps each leg, each exercise X 10 reps each leg, each exercise                           Neuromuscular Re-education (  15 minutes):  Exercise/activities per grid below to improve balance, coordination, kinesthetic sense, posture and proprioception. Required minimal verbal cues to promote static and dynamic balance in standing. Balance/Vestibular Treatment:   Activity   Date  3/11/21 Date  3/16/21 Date Date Date Date   Activity/Exercise   Sets/reps/equipment Sets/reps/  equipment Sets/reps/  equipment Sets/reps/  equipment Sets/reps/  equipment Sets/reps/  equipment   Walking with head turns             Walking with head up & down             Step overs     Over 1/2 foam roll x 15 reps fwd and laterally Over 1/2 foam roll x 15 reps fwd and laterally       Step taps     6 inch step x 20 reps fwd and cross 6 inch step x 20 reps fwd and cross       Marching           Sidestepping           Crossovers           [place]           Walking  backwards             Tandem walking           Weaving in/out of cones             Picking up cones             Sports cord             [employer] ball           Figure 8s            Circles right/left           Walking with 360 degree turns           Spirals           Weight shifting:    Left & Right     Blue foams eyes open Blue foams eyes open       Weight shifting: Forward & Backward      Blue foams eyes open Blue foams eyes open       Static Standing Balance             Standing with feet apart     Blue foams eyes open and closed Blue foams eyes open and closed       Standing with feet together             Standing with feet semitandem   Blue foams eyes open and closed Blue foams eyes open and closed       Standing with feet tandem           Single leg stance           X1/X2 Viewing exercises             Hallpike-Mindy testing for BPPV (Benign Paroxysmal Positional Vertigo)             Cali-Daroff exercises           Canalith Repositioning treatment/Epley Maneuver  for BPPV (Benign Paroxysmal Positional Vertigo)           Smart Equitest Training: See scanned report. Treatment/Session Summary:    · Response to Treatment:  Patient tolerated session well.  She needs rest breaks during exercises due to fatigue. Patient still needs minimal to moderate UE assist during standing activities for balance and safety. . Continue to progress as tolerated. · Communication/Consultation:  None today  · Equipment provided today:  None today  · Recommendations/Intent for next treatment session: Next visit will focus on improving overall mobility, safety, and balance.     Total Treatment Billable Duration:  40 minutes   PT Patient Time In/Time Out  Time In: 1891  Time Out: [place] Angelucci, LUIS    Future Appointments   Date Time Provider Milla Betancur   3/23/2021  1:00 PM Sulaiman Matos [facility]   4/29/2021 11:00 AM Rachelle [facility] [address] [place] [address]   4/29/2021 11:30 AM Magali Santoyo MD [facility]   4/30/2021 10:30 AM Juhi Moody, ALBERT Walters [address]   5/11/2021 10:30 AM Clifford Baxter MD [facility]

## 2021-03-23 ENCOUNTER — HOSPITAL ENCOUNTER (OUTPATIENT)
Dept: PHYSICAL THERAPY | Age: 81
Discharge: HOME | End: 2021-03-23
Attending: NURSE PRACTITIONER
Payer: MEDICARE

## 2021-03-23 PROCEDURE — 97112 NEUROMUSCULAR REEDUCATION: CPT

## 2021-03-23 PROCEDURE — 97110 THERAPEUTIC EXERCISES: CPT

## 2021-03-23 NOTE — PROGRESS NOTES
Kailee Dunn  : 1940  [place]  Secondary: Hernán Melendrez 13 at [facility]  [address], [address], [address] [facility]  [phone]   [phone]        OUTPATIENT PHYSICAL THERAPY: Daily Treatment Note 3/23/2021  Visit Count:  4    ICD-10: Treatment Diagnosis:    Other abnormalities of gait and mobility (R26.89)   Difficulty in walking, not elsewhere classified (R26.2)   Precautions/Allergies:   Betadine [povidone-iodine], Pcn [penicillins], and Pentasa [mesalamine]   TREATMENT PLAN:  Effective Dates: 3/4/2021 TO 2021 (90 days). Frequency/Duration: 2 times a week for 90 Day(s)    Pre-treatment Symptoms/Complaints:  3/23/2021: \"late for appt because thought it was at 1:30. Doing pretty good. Got to go home this weekend. \"  Pain: Initial: Pain Intensity 1: 0 0 Post Session:  0/10   Medications Last Reviewed:  3/23/2021  Updated Objective Findings:  None Today  TREATMENT:     THERAPEUTIC EXERCISE: (15 minutes):  Exercises per grid below to improve mobility, strength, balance and coordination. Required minimal verbal and manual cues to promote proper body alignment, promote proper body posture and promote proper body mechanics. Progressed resistance, range, repetitions and complexity of movement as indicated.      Date:  3/11/21 Date:  3/16/21 Date:  3/23/21   Activity/Exercise Parameters Parameters Parameters   nustep Level 3 x 8 minutes Level 3 x 8 minutes    Step ups 6 inch step x 10 reps each leg with rail 6 inch step x 10 reps each leg with rail 6 inch step x 10 reps each leg with rail   Sit to stand X 10 reps from mat table with no UE assist X 10 reps from chair with no UE assist X 10 reps from chair with no UE assist     Standing B LE: hip flexion, hip abduction, hip extension, knee flexion, ankle dorsiflexion & plantarflexion X 10 reps each leg, each exercise X 10 reps each leg, each exercise X 12 reps each leg, each exercise                           Neuromuscular Re-education (  15 minutes):  Exercise/activities per grid below to improve balance, coordination, kinesthetic sense, posture and proprioception. Required minimal verbal cues to promote static and dynamic balance in standing. Balance/Vestibular Treatment:   Activity   Date  3/11/21 Date  3/16/21 Date  3/23/21 Date Date Date   Activity/Exercise   Sets/reps/equipment Sets/reps/  equipment Sets/reps/  equipment Sets/reps/  equipment Sets/reps/  equipment Sets/reps/  equipment   Walking with head turns             Walking with head up & down             Step overs     Over 1/2 foam roll x 15 reps fwd and laterally Over 1/2 foam roll x 15 reps fwd and laterally Over 1/2 foam roll x 15 reps fwd and laterally      Step taps     6 inch step x 20 reps fwd and cross 6 inch step x 20 reps fwd and cross 6 inch step x 20 reps fwd and cross      Marching     X 30 reps at rail, no UE assist      Sidestepping     In hodgson 4 x 10 feet. Crossovers           [place]           Walking  backwards             Tandem walking           Weaving in/out of cones             Picking up cones             Sports cord             [employer] ball           Figure 8s            Circles right/left           Walking with 360 degree turns           Spirals           Weight shifting:    Left & Right     Blue foams eyes open Blue foams eyes open Blue foams eyes open      Weight shifting:   Forward & Backward      Blue foams eyes open Blue foams eyes open Blue foams eyes open      Static Standing Balance             Standing with feet apart     Blue foams eyes open and closed Blue foams eyes open and closed Blue foams eyes open and closed      Standing with feet together             Standing with feet semitandem   Blue foams eyes open and closed Blue foams eyes open and closed Blue foams eyes open and closed      Standing with feet tandem           Single leg stance X1/X2 Viewing exercises             Hallpike-[place] testing for BPPV (Benign Paroxysmal Positional Vertigo)             Cali-Daroff exercises           Canalith Repositioning treatment/Epley Maneuver  for BPPV (Benign Paroxysmal Positional Vertigo)           Smart Equitest Training: See scanned report. Treatment/Session Summary:    · Response to Treatment:  Patient tolerated session well. She had to stop 2 x due to feeling a little dizziness, but resolved quickly. Patient demonstrated very good balance with activities today. Continue to progress as tolerated. · Communication/Consultation:  None today  · Equipment provided today:  None today  · Recommendations/Intent for next treatment session: Next visit will focus on improving overall mobility, safety, and balance.     Total Treatment Billable Duration:  30 minutes   PT Patient Time In/Time Out  Time In: 5418  Time Out: Leonardo Morton 10, PT    Future Appointments   Date Time Provider Milla Betancur   3/29/2021 11:00 AM Roanoke Beams, PT [facility] SFE   4/6/2021 11:00 AM Nas Shint, PT [facility]   4/12/2021 11:00 AM Roanoke Beams, PT [facility] SFE   4/20/2021 11:00 AM Roanoke Beams, PT [facility] SFE   4/28/2021 11:00 AM Nas Shint, PT [facility] SFE   4/29/2021 11:00 AM [address] [place] [address] OUTREACH INSURANCE [address]   4/29/2021 11:30 AM Martin De La O MD [facility]   4/30/2021 10:30 AM Eliane Moody, ALBERT Reyes [address]   5/11/2021 10:30 AM Woschkolup, Merleen Kocher, MD [facility] BSNE

## 2021-03-29 ENCOUNTER — HOSPITAL ENCOUNTER (OUTPATIENT)
Dept: PHYSICAL THERAPY | Age: 81
Discharge: HOME | End: 2021-03-29
Attending: NURSE PRACTITIONER
Payer: MEDICARE

## 2021-03-29 PROCEDURE — 97110 THERAPEUTIC EXERCISES: CPT

## 2021-03-29 PROCEDURE — 97112 NEUROMUSCULAR REEDUCATION: CPT

## 2021-03-29 NOTE — PROGRESS NOTES
Stevo Delcid  : 1940  [place]  Secondary: Hernán Melendrez 13 at [place] [address] [facility], [address] [place] [address],[address], [place] [address].  [phone]   [phone]        OUTPATIENT PHYSICAL THERAPY: Daily Treatment Note 3/29/2021  Visit Count:  5    ICD-10: Treatment Diagnosis:    Other abnormalities of gait and mobility (R26.89)   Difficulty in walking, not elsewhere classified (R26.2)   Precautions/Allergies:   Betadine [povidone-iodine], Pcn [penicillins], and Pentasa [mesalamine]   TREATMENT PLAN:  Effective Dates: 3/4/2021 TO 2021 (90 days). Frequency/Duration: 2 times a week for 90 Day(s)    Pre-treatment Symptoms/Complaints:  3/29/2021: \"I got to go home again this weekend. I did good. \"  Pain: Initial:   0 Post Session:  0/10   Medications Last Reviewed:  3/29/2021  Updated Objective Findings:  None Today  TREATMENT:     THERAPEUTIC EXERCISE: (15 minutes):  Exercises per grid below to improve mobility, strength, balance and coordination. Required minimal verbal and manual cues to promote proper body alignment, promote proper body posture and promote proper body mechanics. Progressed resistance, range, repetitions and complexity of movement as indicated. Date:  3/29/21   Activity/Exercise Parameters   nustep Level 3 x 8 minutes   Step ups 6 inch step x 10 reps each leg with rail   Sit to stand 2X 10 reps from chair with no UE assist     Standing B LE: hip flexion, hip abduction, hip extension, knee flexion, ankle dorsiflexion & plantarflexion X 10 reps each leg, each exercise                     Neuromuscular Re-education (  25 minutes):  Exercise/activities per grid below to improve balance, coordination, kinesthetic sense, posture and proprioception. Required minimal verbal cues to promote static and dynamic balance in standing.   Balance/Vestibular Treatment:   Activity   Date  3/29/21 Date Date Date Activity/Exercise   Sets/reps/  equipment Sets/reps/  equipment Sets/reps/  equipment Sets/reps/  equipment   Walking with head turns           Walking with head up & down           Step overs     Over 1/2 foam roll x 15 reps fwd and laterally      Step taps     6 inch step x 20 reps fwd and cross      Marching   X 30 reps at rail, no UE assist      Sidestepping   In hodgson 4 x 10 feet. Crossovers         [place]         Walking  backwards           Tandem walking         Weaving in/out of cones           Picking up cones           Sports cord           [employer] ball         Figure 8s          Circles right/left         Walking with 360 degree turns         Spirals         Weight shifting:    Left & Right     Blue foams eyes open      Weight shifting: Forward & Backward      Blue foams eyes open      Static Standing Balance           Standing with feet apart     Blue foams eyes open and closed      Standing with feet together           Standing with feet semitandem   Blue foams eyes open and closed      Standing with feet tandem         Single leg stance         X1/X2 Viewing exercises           Hallpike-[place] testing for BPPV (Benign Paroxysmal Positional Vertigo)           Cali-Daroff exercises         Canalith Repositioning treatment/Epley Maneuver  for BPPV (Benign Paroxysmal Positional Vertigo)         Smart Equitest Training: See scanned report. Treatment/Session Summary:    · Response to Treatment:  Patient tolerated session well. She needed frequent rest breaks due to fatigue but had no dizzy episodes today. Continue to progress as tolerated. · Communication/Consultation:  None today  · Equipment provided today:  None today  · Recommendations/Intent for next treatment session: Next visit will focus on improving overall mobility, safety, and balance.     Total Treatment Billable Duration:  40 minutes   PT Patient Time In/Time Out  Time In: 1100  Time Out: Jennifer 43 Dietra Minion    Future Appointments   Date Time Provider Milla Gypsy   4/6/2021 11:00 AM Tai Allison, PT [facility] SFE   4/12/2021 11:00 AM Vernon Crigler, PT [facility]   4/20/2021 11:00 AM Vernon Crigler, PT [facility]FE   4/28/2021 11:00 AM Tai Allison, PT [facility]   4/29/2021 11:00 AM [address] [place] [address] OUTREACH INSURANCE [address]   4/29/2021 11:30 AM Jaison Bentley MD [facility]   4/30/2021 10:30 AM Renee Moody, ALBERT Avila [address]   5/11/2021 10:30 AM Quang Baxter MD [facility]

## 2021-04-06 ENCOUNTER — HOSPITAL ENCOUNTER (OUTPATIENT)
Dept: PHYSICAL THERAPY | Age: 81
Discharge: HOME | End: 2021-04-06
Attending: NURSE PRACTITIONER
Payer: MEDICARE

## 2021-04-09 ENCOUNTER — HOSPITAL ENCOUNTER (OUTPATIENT)
Dept: PHYSICAL THERAPY | Age: 81
Discharge: HOME | End: 2021-04-09
Attending: NURSE PRACTITIONER
Payer: MEDICARE

## 2021-04-09 PROCEDURE — 97112 NEUROMUSCULAR REEDUCATION: CPT

## 2021-04-09 PROCEDURE — 97110 THERAPEUTIC EXERCISES: CPT

## 2021-04-09 NOTE — PROGRESS NOTES
Zuleyma Favorite  : 1940  [place]  Secondary: Hernán Melendrez 13 at [place] [address] [facility], [address] [place] [address],[address], [address] [facility]  [phone]   [phone]        OUTPATIENT PHYSICAL THERAPY: Daily Treatment Note 2021  Visit Count:  6    ICD-10: Treatment Diagnosis:    Other abnormalities of gait and mobility (R26.89)   Difficulty in walking, not elsewhere classified (R26.2)   Precautions/Allergies:   Betadine [povidone-iodine], Pcn [penicillins], and Pentasa [mesalamine]   TREATMENT PLAN:  Effective Dates: 3/4/2021 TO 2021 (90 days). Frequency/Duration: 2 times a week for 90 Day(s)    Pre-treatment Symptoms/Complaints:  2021: Patient reports she is doing pretty good today. Pain: Initial: Pain Intensity 1: 0 0 Post Session:  0/10   Medications Last Reviewed:  2021  Updated Objective Findings:  None Today  TREATMENT:     THERAPEUTIC EXERCISE: (15 minutes):  Exercises per grid below to improve mobility, strength, balance and coordination. Required minimal verbal and manual cues to promote proper body alignment, promote proper body posture and promote proper body mechanics. Progressed resistance, range, repetitions and complexity of movement as indicated. Date:  2021   Activity/Exercise Parameters   nustep Level 3   8 minutes   Step ups 6 inch step x 10 reps each leg with rail   Sit to stand 2X 10 reps from chair with no UE assist     Standing B LE: hip flexion, hip abduction, hip extension, knee flexion, ankle dorsiflexion & plantarflexion X 10 reps each leg, each exercise       Neuromuscular Re-education (  25 minutes):  Exercise/activities per grid below to improve balance, coordination, kinesthetic sense, posture and proprioception. Required minimal verbal cues to promote static and dynamic balance in standing.   Balance/Vestibular Treatment:   Activity   Date  2021   Activity/Exercise Sets/reps/  equipment   Walking with head turns        Walking with head up & down        Step overs     Over 1/2 foam roll  15 reps forward  15 reps laterally   Step taps     6 inch step   20 reps forward  20 reps cross   Marching   30 reps at rail, no UE assist   Sidestepping   In hodgson   4 reps  10 feet   Crossovers      [place]      Walking  backwards        Tandem walking      Weaving in/out of cones        Picking up cones        Sports cord        [employer]      Kick ball      Figure 8s       Circles right/left      Walking with 360 degree turns      Spirals      Weight shifting:    Left & Right     Blue foams eyes open   Weight shifting: Forward & Backward      Blue foams eyes open   Static Standing Balance        Standing with feet apart     Blue foams eyes open and closed   Standing with feet together        Standing with feet semitandem   Blue foams eyes open and closed   Standing with feet tandem      Single leg stance        Treatment/Session Summary:    · Response to Treatment:  Patient tolerated treatment well with several sitting rest breaks due to fatigue. · Communication/Consultation:  None today  · Equipment provided today:  None today  · Recommendations/Intent for next treatment session: Next visit will focus on improving overall mobility, safety, and balance.     Total Treatment Billable Duration:  40 minutes   PT Patient Time In/Time Out  Time In: 1300  Time Out: 75 Veronica Avila, PT    Future Appointments   Date Time Provider Milla Betancur   4/9/2021  1:00 PM Valentin Jj, PT [facility] SFE   4/12/2021 11:00 AM Robert Tijerina, PT [facility]   4/20/2021 11:00 AM Robert Tijerina, PT [facility]   4/28/2021 11:00 AM Valentin Jj, PT [facility]   4/30/2021 10:30 AM Thaddeus Moody NP [facility] [address]   5/11/2021 10:30 AM Roger Baxter MD [facility] BSNE   6/14/2021 10:40 AM GCC OUTREACH INSURANCE [facility] [address]   6/14/2021 11:10 AM Beatriz Harden MD [facility]

## 2021-04-12 ENCOUNTER — HOSPITAL ENCOUNTER (OUTPATIENT)
Dept: PHYSICAL THERAPY | Age: 81
Discharge: HOME | End: 2021-04-12
Attending: NURSE PRACTITIONER
Payer: MEDICARE

## 2021-04-12 PROCEDURE — 97110 THERAPEUTIC EXERCISES: CPT

## 2021-04-12 PROCEDURE — 97112 NEUROMUSCULAR REEDUCATION: CPT

## 2021-04-12 NOTE — PROGRESS NOTES
Cinda Lopez  : 1940  [place]  Secondary: Hernán Melendrez 13 at [place] [id number] [facility], [address] [place] [address],[address], [facility] [address]  [phone]   [phone]        OUTPATIENT PHYSICAL THERAPY: Daily Treatment Note 2021  Visit Count:  7    ICD-10: Treatment Diagnosis:    Other abnormalities of gait and mobility (R26.89)   Difficulty in walking, not elsewhere classified (R26.2)   Precautions/Allergies:   Betadine [povidone-iodine], Pcn [penicillins], and Pentasa [mesalamine]   TREATMENT PLAN:  Effective Dates: 3/4/2021 TO 2021 (90 days). Frequency/Duration: 2 times a week for 90 Day(s)    Pre-treatment Symptoms/Complaints:  2021: knees hurt when I get up and down. No falls. Still dizzy headed off an on. No dizziness now. Pain: Initial:   0 Post Session:  0/10   Medications Last Reviewed:  2021  Updated Objective Findings:  None Today  TREATMENT:     THERAPEUTIC EXERCISE: (15 minutes):  Exercises per grid below to improve mobility, strength, balance and coordination. Required minimal verbal and manual cues to promote proper body alignment, promote proper body posture and promote proper body mechanics. Progressed resistance, range, repetitions and complexity of movement as indicated. Date:  2021   Activity/Exercise Parameters   nustep Level 3   8 minutes   Step ups 6 inch step x 10 reps each leg with rail   Sit to stand 2X 10 reps from mat table with no UE assist     Standing B LE: hip flexion, hip abduction, hip extension, knee flexion, ankle dorsiflexion & plantarflexion X 10 reps each leg, each exercise       Neuromuscular Re-education (  25 minutes):  Exercise/activities per grid below to improve balance, coordination, kinesthetic sense, posture and proprioception. Required minimal verbal cues to promote static and dynamic balance in standing.   Balance/Vestibular Treatment:   Activity Date  4/12/2021   Activity/Exercise   Sets/reps/  equipment   Walking with head turns        Walking with head up & down        Step overs     Over 1/2 foam roll  15 reps forward  15 reps laterally   Step taps     6 inch step   20 reps forward  20 reps cross   Marching   30 reps at rail, no UE assist   Sidestepping   In hodgson   4 reps  10 feet   Crossovers      [place]      Walking  backwards        Tandem walking      Weaving in/out of cones        Picking up cones        Sports cord        [employer] ball      Figure 8s       Circles right/left      Walking with 360 degree turns      Spirals      Weight shifting:    Left & Right     Blue foams eyes open   Weight shifting: Forward & Backward      Blue foams eyes open   Static Standing Balance        Standing with feet apart     Blue foams eyes open and closed   Standing with feet together        Standing with feet semitandem   Blue foams eyes open and closed   Standing with feet tandem      Single leg stance        Treatment/Session Summary:    · Response to Treatment:  Patient tolerated treatment well. She is demonstrting improving activity tolerance with exercises. Balance on blue foams was better today. Continue to progress as tolerated. · Equipment provided today:  None today  · Recommendations/Intent for next treatment session: Next visit will focus on improving overall mobility, safety, and balance.     Total Treatment Billable Duration:  40 minutes   PT Patient Time In/Time Out  Time In: 1100  Time Out: Rohkathryn 43 Delfin Carvajal    Future Appointments   Date Time Provider Milla Betancur   4/20/2021 11:00 AM [facility]   4/28/2021 11:00 AM Laine Means PT [facility]   4/30/2021 10:30 AM Amirah Moody, ALBERT [facility]   5/11/2021 10:30 AM Woschkolup, Renaee Klinefelter, MD [facility] BSNE   6/14/2021 10:40 AM GCC OUTREACH INSURANCE [facility]   6/14/2021 11:10 AM John Stone MD [facility]

## 2021-04-20 ENCOUNTER — HOSPITAL ENCOUNTER (OUTPATIENT)
Dept: PHYSICAL THERAPY | Age: 81
Discharge: HOME | End: 2021-04-20
Attending: NURSE PRACTITIONER
Payer: MEDICARE

## 2021-04-28 ENCOUNTER — HOSPITAL ENCOUNTER (OUTPATIENT)
Dept: PHYSICAL THERAPY | Age: 81
Discharge: HOME | End: 2021-04-28
Attending: NURSE PRACTITIONER
Payer: MEDICARE

## 2021-04-28 PROCEDURE — 97110 THERAPEUTIC EXERCISES: CPT

## 2021-04-28 PROCEDURE — 97112 NEUROMUSCULAR REEDUCATION: CPT

## 2021-04-30 ENCOUNTER — APPOINTMENT (OUTPATIENT)
Dept: RADIATION ONCOLOGY | Age: 81
End: 2021-04-30

## 2021-05-11 ENCOUNTER — HOSPITAL ENCOUNTER (OUTPATIENT)
Dept: PHYSICAL THERAPY | Age: 81
Discharge: HOME | End: 2021-05-11
Attending: NURSE PRACTITIONER
Payer: MEDICARE

## 2021-05-14 ENCOUNTER — HOSPITAL ENCOUNTER (OUTPATIENT)
Dept: PHYSICAL THERAPY | Age: 81
Discharge: HOME | End: 2021-05-14
Attending: NURSE PRACTITIONER
Payer: MEDICARE

## 2021-05-14 PROCEDURE — 97112 NEUROMUSCULAR REEDUCATION: CPT

## 2021-05-14 PROCEDURE — 97110 THERAPEUTIC EXERCISES: CPT

## 2021-05-14 NOTE — PROGRESS NOTES
Kyle Morgan  : 1940  [place]  Secondary: Hernán Wrightarnulfo 13 at [address], [address] [place] [address],[address] [facility], [address] [facility]  [phone]   [phone]        OUTPATIENT PHYSICAL THERAPY: Daily Treatment Note 2021  Visit Count:  9    ICD-10: Treatment Diagnosis:    Other abnormalities of gait and mobility (R26.89)   Difficulty in walking, not elsewhere classified (R26.2)   Precautions/Allergies:   Betadine [povidone-iodine], Pcn [penicillins], and Pentasa [mesalamine]   TREATMENT PLAN:  Effective Dates: 3/4/2021 TO 2021 (90 days). Frequency/Duration: 2 times a week for 90 Day(s)    Pre-treatment Symptoms/Complaints:  2021: No falls. Doing well. Pain: Initial:   0 Post Session:  0/10   Medications Last Reviewed:  2021  Updated Objective Findings:  None Today  TREATMENT:     THERAPEUTIC EXERCISE: (15 minutes):  Exercises per grid below to improve mobility, strength, balance and coordination. Required minimal verbal and manual cues to promote proper body alignment, promote proper body posture and promote proper body mechanics. Progressed resistance, range, repetitions and complexity of movement as indicated. Date:  2021   Activity/Exercise Parameters   Nustep Level 3   8 minutes   Step ups 6 inch step x 10 reps each leg with rail   Sit to stand 2X 10 reps from mat table with no UE assist     Standing B LE: hip flexion, hip abduction, hip extension, knee flexion, ankle dorsiflexion & plantarflexion X 10 reps each leg, each exercise       Neuromuscular Re-education (  25 minutes):  Exercise/activities per grid below to improve balance, coordination, kinesthetic sense, posture and proprioception. Required minimal verbal cues to promote static and dynamic balance in standing.   Balance/Vestibular Treatment:   Activity   Date  2021   Activity/Exercise   Sets/reps/  equipment   Walking with head turns        Walking with head up & down        Step overs     Over 1/2 foam roll  15 reps forward  15 reps laterally   Step taps     6 inch step   20 reps forward  20 reps cross   Marching   30 reps at rail, no UE assist   Sidestepping   In hodgson   4 reps  10 feet   Crossovers      [place]      Walking  backwards        Tandem walking      Weaving in/out of cones        Picking up cones        Sports cord        [employer]      Kick ball      Figure 8s       Circles right/left      Walking with 360 degree turns      Spirals      Weight shifting:    Left & Right     Blue foams eyes open   Weight shifting: Forward & Backward      Blue foams eyes open   Static Standing Balance        Standing with feet apart     Blue foams eyes open and closed   Standing with feet together        Standing with feet semitandem   Blue foams eyes open and closed   Standing with feet tandem      Single leg stance        Treatment/Session Summary:    · Response to Treatment:  Patient tolerated treatment well. No dizziness with exercises. Patient is maintaining balance better and getting stronger. DO PROGRESS NOTE NEXT SESSION. · Equipment provided today:  None today  · Recommendations/Intent for next treatment session: Next visit will focus on improving overall mobility, safety, and balance.     Total Treatment Billable Duration:  40 minutes   PT Patient Time In/Time Out  Time In: 7244  Time Out: [address] [facility], PT    Future Appointments   Date Time Provider Milla Betancur   5/17/2021 11:00 AM Zada Erp, PT [facility] SFE   5/21/2021 10:15 AM Zada Erp, PT [facility]FE   5/25/2021  1:00 PM Zada Erp, PT [facility]FE   5/27/2021  1:00 PM Zada Erp, PT [facility] SFE   6/14/2021 10:40 AM GCC OUTREACH INSURANCE [facility] [address]   6/14/2021 11:10 AM Daryn Jameson MD [facility]   6/24/2021 10:00 AM Katina Carballo MD [facility] [address]   7/29/2021 11:30 AM Ping Arce MD [facility] BSNE

## 2021-05-17 ENCOUNTER — HOSPITAL ENCOUNTER (OUTPATIENT)
Dept: PHYSICAL THERAPY | Age: 81
Discharge: HOME | End: 2021-05-17
Attending: NURSE PRACTITIONER
Payer: MEDICARE

## 2021-05-17 PROCEDURE — 97112 NEUROMUSCULAR REEDUCATION: CPT

## 2021-05-17 PROCEDURE — 97110 THERAPEUTIC EXERCISES: CPT

## 2021-05-17 NOTE — PROGRESS NOTES
Stevo Delcid  : 1940  [place]  Secondary: Hernán Melendrez 13 at [address], [address] [place] [address],[address], 20[address] [facility]  [phone]   [phone]        OUTPATIENT PHYSICAL THERAPY: Daily Treatment Note 2021  Visit Count:  10    ICD-10: Treatment Diagnosis:    Other abnormalities of gait and mobility (R26.89)   Difficulty in walking, not elsewhere classified (R26.2)   Precautions/Allergies:   Betadine [povidone-iodine], Pcn [penicillins], and Pentasa [mesalamine]   TREATMENT PLAN:  Effective Dates: 3/4/2021 TO 2021 (90 days). Frequency/Duration: 2 times a week for 90 Day(s)    Pre-treatment Symptoms/Complaints:  2021: Doing fine. No falls. Pain: Initial:   0 Post Session:  0/10   Medications Last Reviewed:  2021  Updated Objective Findings:  see progress note  TREATMENT:     THERAPEUTIC EXERCISE: (15 minutes):  Exercises per grid below to improve mobility, strength, balance and coordination. Required minimal verbal and manual cues to promote proper body alignment, promote proper body posture and promote proper body mechanics. Progressed resistance, range, repetitions and complexity of movement as indicated. Date:  2021   Activity/Exercise Parameters   Nustep Level 4  8 minutes   Step ups 6 inch step x 10 reps each leg with rail   Sit to stand 2X 10 reps from mat table with no UE assist     Standing B LE: hip flexion, hip abduction, hip extension, knee flexion, ankle dorsiflexion & plantarflexion X 10 reps each leg, each exercise       Neuromuscular Re-education (  25 minutes):  Exercise/activities per grid below to improve balance, coordination, kinesthetic sense, posture and proprioception. Required minimal verbal cues to promote static and dynamic balance in standing.   Balance/Vestibular Treatment:   Activity   Date  2021   Activity/Exercise   Sets/reps/  equipment   Walking with head turns        Walking with head up & down        Step overs     Over 1/2 foam roll  15 reps forward  15 reps laterally   Step taps     6 inch step   20 reps forward  20 reps cross   Marching   30 reps at rail, no UE assist   Sidestepping   In hodgson   4 reps  10 feet   Crossovers      [place]      Walking  backwards        Tandem walking      Weaving in/out of cones        Picking up cones        Sports cord        [employer]      Kick ball      Figure 8s       Circles right/left      Walking with 360 degree turns      Spirals      Weight shifting:    Left & Right     Blue foams eyes open   Weight shifting: Forward & Backward      Blue foams eyes open   Static Standing Balance        Standing with feet apart     Blue foams eyes open and closed   Standing with feet together        Standing with feet semitandem   Blue foams eyes open and closed   Standing with feet tandem      Single leg stance        Treatment/Session Summary:    · Response to Treatment:  Patient tolerated treatment well. Patient had a few instances of imbalance with standing weight shifting activities, but otherwise did well. Plan to take a break from PT after May appointments. · Equipment provided today:  None today  · Recommendations/Intent for next treatment session: Next visit will focus on improving overall mobility, safety, and balance.     Total Treatment Billable Duration:  40 minutes   PT Patient Time In/Time Out  Time In: 1100  Time Out: Jennifer Merchant, PT    Future Appointments   Date Time Provider Milla Betancur   5/21/2021 10:15 AM Kelvin Loya PT [facility]   5/25/2021  1:00 PM Kelvin Loya PT [facility]   5/27/2021  1:00 PM Kelvin Loya PT SFE[facility]PT SFE   6/14/2021 10:40 AM [facility] INS[facility]   6/14/2021 11:10 AM Rochelle Quiros MD [facility]   6/24/2021 10:00 AM Jay Richard MD [facility]   7/29/2021 11:30 AM Ramu Baxter MD BSNE BSNE

## 2021-05-17 NOTE — PROGRESS NOTES
Ayaka Yang  : 1940  [place]  Secondary: Hernán Melendrez 13 at [facility]  [address], [address], [address]  [phone]   [phone]          OUTPATIENT PHYSICAL THERAPY:Progress Report 2021   ICD-10: Treatment Diagnosis:    Other abnormalities of gait and mobility (R26.89)   Difficulty in walking, not elsewhere classified (R26.2)   Precautions/Allergies:   Betadine [povidone-iodine], Pcn [penicillins], and Pentasa [mesalamine]   TREATMENT PLAN:  Effective Dates: 3/4/2021 TO 2021 (90 days). Frequency/Duration: 2 times a week for 90 Day(s) MEDICAL/REFERRING DIAGNOSIS:  Parkinson's disease [G20]   DATE OF ONSET: Chronic  REFERRING PHYSICIAN: Lisbeth Lugo NP MD Orders: Evaluate and Treat  Return MD Appointment: TBD     PROGRESS NOTE 21:  Ms. Brionna El has attended 10 PT sessions from 3/4/21 to 21 for decreased mobility, decreased strength, and decreased balance secondary to degenerative changes. Patient is progressing well with PT. She demonstrates improved scores on balance testing today as seen below. Patient would benefit from continuing PT to address these problems to improve patient's independence and safety with mobility and daily activities. Thank you. PROBLEM LIST (Impacting functional limitations):  1. Decreased Strength  2. Decreased ADL/Functional Activities  3. Decreased Transfer Abilities  4. Decreased Ambulation Ability/Technique  5. Decreased Balance  6. Decreased Activity Tolerance INTERVENTIONS PLANNED: (Treatment may consist of any combination of the following)  1. Balance Exercise  2. Gait Training  3. Home Exercise Program (HEP)  4. Neuromuscular Re-education/Strengthening  5. Range of Motion (ROM)  6. Therapeutic Activites  7.  Therapeutic Exercise/Strengthening     GOALS: (Goals have been discussed and agreed upon with patient.)  Short-Term Functional Goals: Time Frame: 30 days  1. Patient will be independent with home exercise program without exacerbation of symptoms or cueing needed. MET  Discharge Goals: Time Frame: 90 days  1. Patient will be independent with all ADLs with minimal fear of falling and loss of balance with daily tasks. Progressing and ongoing  2. Patient will report no fear avoidance with social or recreational activities due to fear of falling. Progressing and ongoing  3. Patient will score greater than or equal to 45/56 on Pride Balance Scale with minimal effect of imbalance on patient's ability to manage every day life activities. Progressing and ongoing  4. Patient will score greater than or equal to 19/24 on Dynamic Gait Index with minimal effect of imbalance on patient's ability to manage every day life activities. Progressing and ongoing    OUTCOME MEASURE:   Tool Used: Pride Balance Scale  Score:  Initial: 33/56 Most Recent: 41/56 (Date: 5/17/21 )   Interpretation of Score: Each section is scored on a 0-4 scale, 0 representing the patients inability to perform the task and 4 representing independence. The scores of each section are added together for a total score of 56. The higher the patients score, the more independent the patient is. Any score below 45 indicates increased risk for falls. Tool Used: Dynamic Gait Index  Score:  Initial: 13/24 Most Recent: 17/24 (Date:5/17/21 )   Interpretation of Score: Each section is scored on a 0-3 scale, 0 representing the patients inability to perform the task and 3 representing independence. The scores of each section are added together for a total score of 24. Any score below 19 indicates increased risk for falls. MEDICAL NECESSITY:   · Patient is expected to demonstrate progress in strength, range of motion, balance, coordination and functional technique to improve safety during daily activities.   · Patient demonstrates fair rehab potential due to higher previous functional level.  · Skilled intervention continues to be required due to decreased functional mobility. REASON FOR SERVICES/OTHER COMMENTS:  · Patient continues to demonstrate capacity to improve overall functional mobility which will increase independence and increase safety. Total Duration:  PT Patient Time In/Time Out  Time In: 1100  Time Out: 1140         PAIN/SUBJECTIVE:   Initial:   0 Post Session:  0/10   HISTORY:   History of Injury/Illness (Reason for Referral):  Patient reports she is returning to therapy because she is having some dizziness. Her niece reports this is due more to a cardiac issue and losing consciousness. Ms. David Brush reports that she has been having difficulty moving around as well as maintaining her balance. She reports she fell about two months ago due to the dizziness. She reports that she always tends to fall forward. She reports the dizziness will come on with standing or when she is lying down. She reports that she is doing better to recognize the dizziness and stop what she is doing so she doesn't fall. She reports that she has also not been doing her exercises. She reports that she does get up and move around the house, but does not go outside. She reports she would like to work on improving her overall mobility and strength. Past Medical History/Comorbidities:   Ms. David Brush  has a past medical history of Anxiety, Cancer ([facility] [address] Cancer of right breast ([facility] [address] (Dx 04/2019), Crohn's disease ([facility] [address] Heart failure ([address] [address] Hypertension, Psychiatric disorder, and Thyroid disease. Ms. Dvaid Brush  has a past surgical history that includes hx hysterectomy; hx lumbar laminectomy; ir insert tunl cvc w port over 5 years (4/18/2019); hx vascular access; and ir remove tunl cvad w port/pump (8/7/2020).   Social History/Living Environment:   Home Environment: Private residence  Living Alone: No  Support Systems: Family member(s), Friends \ neighbors  Prior Level of Function/Work/Activity:  Lives with niece, Samia  Dominant Side:         RIGHT  Personal Factors:          Sex:  female        Age:  79 y.o. Ambulatory/Rehab Services H2 Model Falls Risk Assessment   Risk Factors:       (1)  Dizziness/Vertigo       (5)  History of Recent Falls [w/in 3 months] Ability to Rise from Chair:       (1)  Pushes up, successful in one attempt   Falls Prevention Plan:       No modifications necessary   Total: (5 or greater = High Risk): 7   ©2010 [facility] of [employer]. All Rights Reserved. [facility] [place] Patent #[id number]. Federal Law prohibits the replication, distribution or use without written permission from [facility] [employer]   Current Medications:       Current Outpatient Medications:     carbidopa-levodopa ER (SINEMET CR)  mg per tablet, TAKE 1 TABLET BY MOUTH DAILY AT 9PM, Disp: 30 Tab, Rfl: 0    anastrozole (ARIMIDEX) 1 mg tablet, TAKE 1 TABLET DAILY, Disp: 90 Tab, Rfl: 3    carbidopa-levodopa (Sinemet)  mg per tablet, 1.5 tabs TID every 5 hours at 7 am, noon and 7 pm, Disp: 405 Tab, Rfl: 3    PARoxetine CR (PAXIL-CR) 25 mg tablet, TAKE 1 TABLET BY MOUTH EVERY DAY, Disp: , Rfl:     levomefolate calcium (DEPLIN PO), Take 7.5 mg by mouth daily. , Disp: , Rfl:     cholecalciferol (VITAMIN D3) (2,000 UNITS /50 MCG) cap capsule, Take  by mouth two (2) times a day., Disp: , Rfl:     DISABLED PLACARD (DISABLED PLACARD) DMV, Handicap placard, Disp: 1 Each, Rfl: 0    potassium chloride (KLOR-CON) 10 mEq tablet, Take 2 Tabs by mouth two (2) times a day. (Patient taking differently: Take 10 mEq by mouth daily.), Disp: 30 Tab, Rfl: 3    LORazepam (ATIVAN) 1 mg tablet, Take 1.5 mg by mouth three (3) times daily. , Disp: , Rfl:     amLODIPine (NORVASC) 10 mg tablet, Take 5 mg by mouth daily. , Disp: , Rfl:     losartan (COZAAR) 50 mg tablet, Take 25 mg by mouth daily. , Disp: , Rfl:     levothyroxine (SYNTHROID) 75 mcg tablet, Take  by mouth Daily (before breakfast). , Disp: , Rfl:     esomeprazole (NEXIUM) 40 mg capsule, Take  by mouth daily. , Disp: , Rfl:     oxybutynin chloride XL (DITROPAN XL) 10 mg CR tablet, Take 10 mg by mouth daily. , Disp: , Rfl:     melatonin 3 mg tablet, Take 6 mg by mouth nightly., Disp: , Rfl:     OLANZapine (ZYPREXA) 5 mg tablet, Take 5 mg by mouth two (2) times a day., Disp: , Rfl:    Date Last Reviewed:  5/17/2021    Number of Personal Factors/Comorbidities that affect the Plan of Care: 1-2: MODERATE COMPLEXITY   EXAMINATION:   Observation/Orthostatic Postural Assessment:          Posture Assessment: Rounded shoulders, Forward head   Functional Mobility:   Gait/Mobility:    Base of Support: Center of gravity altered  Speed/Yamilex: Pace decreased (<100 feet/min)  Step Length: Left shortened, Right shortened  Swing Pattern: Left asymmetrical, Right asymmetrical  Stance: Left decreased, Right decreased  Gait Abnormalities: Antalgic  Ambulation - Level of Assistance: Independent  · Interventions: Verbal cues, Safety awareness training          Transfers:     Sit to Stand: Modified independent  Stand to Sit: Modified independent  Stand Pivot Transfers: Modified independent  Bed to Chair: Modified independent  Lateral Transfers: Modified independent         Bed Mobility:     Rolling: Modified independent  Supine to Sit: Modified independent  Sit to Supine: Modified independent  Scooting: Modified independent       Strength:          UE STRENGTH: 3/5 shoulder flexion, 3/5 shoulder abduction, 3/5 shoulder extension, 3/5 elbow flexion, 3/5 elbow extension. LE STRENGTH:  3/5 hip flexion, 3/5 hip abduction, 3/5 hip adduction, 3/5 hip extension, 3/5 knee extension, 3/5 knee flexion, 2/5 ankle dorsiflexion, 2/5 ankle plantarflexion, 2/5 ankle inversion, 2/5 ankle eversion.   Sensation:         Intact  Postural Control & Balance:  · Pride Balance Scale:  33/56.   (A score less than 45/56 indicates high risk of falls)     · Dynamic Gait Index:  13/24.   (A score less than or equal to19/24 is abnormal and predictive of falls)        Body Structures Involved:  1. Nerves  2. Muscles Body Functions Affected:  1. Neuromusculoskeletal  2. Movement Related Activities and Participation Affected:  1. Mobility  2.  Self Care   Number of elements (examined above) that affect the Plan of Care: 4+: HIGH COMPLEXITY   CLINICAL PRESENTATION:   Presentation: Evolving clinical presentation with changing clinical characteristics: MODERATE COMPLEXITY   CLINICAL DECISION MAKING:   Use of outcome tool(s) and clinical judgement create a POC that gives a: Questionable prediction of patient's progress: MODERATE COMPLEXITY

## 2021-05-21 ENCOUNTER — HOSPITAL ENCOUNTER (OUTPATIENT)
Dept: PHYSICAL THERAPY | Age: 81
Discharge: HOME | End: 2021-05-21
Attending: NURSE PRACTITIONER
Payer: MEDICARE

## 2021-05-21 PROCEDURE — 97110 THERAPEUTIC EXERCISES: CPT

## 2021-05-21 PROCEDURE — 97112 NEUROMUSCULAR REEDUCATION: CPT

## 2021-05-21 NOTE — PROGRESS NOTES
Bonnie Baez  : 1940  [place]  Secondary: Hernán Melendrez 13 at [place] [id number] [facility], [address] [place] [address],[address], [address] [facility]  [phone]   [phone]        OUTPATIENT PHYSICAL THERAPY: Daily Treatment Note 2021  Visit Count:  11    ICD-10: Treatment Diagnosis:    Other abnormalities of gait and mobility (R26.89)   Difficulty in walking, not elsewhere classified (R26.2)   Precautions/Allergies:   Betadine [povidone-iodine], Pcn [penicillins], and Pentasa [mesalamine]   TREATMENT PLAN:  Effective Dates: 3/4/2021 TO 2021 (90 days). Frequency/Duration: 2 times a week for 90 Day(s)    Pre-treatment Symptoms/Complaints:  2021: Doing well. Pain: Initial:   0 Post Session:  0/10   Medications Last Reviewed:  2021  Updated Objective Findings:  None Today  TREATMENT:     THERAPEUTIC EXERCISE: (15 minutes):  Exercises per grid below to improve mobility, strength, balance and coordination. Required minimal verbal and manual cues to promote proper body alignment, promote proper body posture and promote proper body mechanics. Progressed resistance, range, repetitions and complexity of movement as indicated. Date:  2021   Activity/Exercise Parameters   Nustep Level 3  5 minutes   Step ups 6 inch step x 10 reps each leg with rail   Sit to stand 2X 10 reps from chair  with no UE assist     Standing B LE: hip flexion, hip abduction, hip extension, knee flexion, ankle dorsiflexion & plantarflexion X 10 reps each leg, each exercise       Neuromuscular Re-education (  30 minutes):  Exercise/activities per grid below to improve balance, coordination, kinesthetic sense, posture and proprioception. Required minimal verbal cues to promote static and dynamic balance in standing.   Balance/Vestibular Treatment:   Activity   Date  2021   Activity/Exercise   Sets/reps/  equipment   Walking with head turns Walking with head up & down        Step overs     Over 1/2 foam roll  15 reps forward  15 reps laterally   Step taps     6 inch step   20 reps forward  20 reps cross   Marching   30 reps at rail, no UE assist   Sidestepping   In hodgson   4 reps  10 feet   Crossovers      [place]      Walking  backwards        Tandem walking      Weaving in/out of cones        Picking up cones        Sports cord        [employer]      Kick ball      Figure 8s       Circles right/left      Walking with 360 degree turns      Spirals      Weight shifting:    Left & Right     Blue foams eyes open   Weight shifting: Forward & Backward      Blue foams eyes open   Static Standing Balance        Standing with feet apart     Blue foams eyes open and closed   Standing with feet together        Standing with feet semitandem   Blue foams eyes open and closed   Standing with feet tandem      Single leg stance        Treatment/Session Summary:    · Response to Treatment:  Patient tolerated treatment well. Patient feels okay with taking a break from PT after next week. Spoke with [facility] as well today about the plan. Plan to take a break from PT after May appointments, with pt to continue with HEP, and come back with new referral in a few or several months as needed. · Equipment provided today:  None today  · Recommendations/Intent for next treatment session: Next visit will focus on improving overall mobility, safety, and balance.     Total Treatment Billable Duration:  45 minutes   PT Patient Time In/Time Out  Time In: 7974  Time Out: 0871 Chetan Galicia PT    Future Appointments   Date Time Provider Milla Betancur   5/25/2021  1:00 PM [facility]   5/27/2021  1:00 PM Gi Pinedo PT [facility]   6/14/2021 10:40 AM GCC OUTREACH INSURANCE [facility]   6/14/2021 11:10 AM Phillip Carballo MD [facility]   6/24/2021 10:00 AM Lawrence Wang MD [facility] BEHAVIORAL HEALTH SERVICES [facility]   7/29/2021 11:30 AM Vee Roger De Dios MD [facility] BS[facility]

## 2021-05-25 ENCOUNTER — HOSPITAL ENCOUNTER (OUTPATIENT)
Dept: PHYSICAL THERAPY | Age: 81
Discharge: HOME | End: 2021-05-25
Attending: NURSE PRACTITIONER
Payer: MEDICARE

## 2021-05-25 PROCEDURE — 97112 NEUROMUSCULAR REEDUCATION: CPT

## 2021-05-25 PROCEDURE — 97110 THERAPEUTIC EXERCISES: CPT

## 2021-05-25 NOTE — PROGRESS NOTES
Radames De La Torre  : 1940  [place]  Secondary: Hernán Melendrez 13 at [address], [address] [place] [address],[address], [address] [facility]  [phone]   [phone]        OUTPATIENT PHYSICAL THERAPY: Daily Treatment Note 2021  Visit Count:  12    ICD-10: Treatment Diagnosis:    Other abnormalities of gait and mobility (R26.89)   Difficulty in walking, not elsewhere classified (R26.2)   Precautions/Allergies:   Betadine [povidone-iodine], Pcn [penicillins], and Pentasa [mesalamine]   TREATMENT PLAN:  Effective Dates: 3/4/2021 TO 2021 (90 days). Frequency/Duration: 2 times a week for 90 Day(s)    Pre-treatment Symptoms/Complaints:  2021: \"Doing pretty good. \"  Pain: Initial:   0 Post Session:  0/10   Medications Last Reviewed:  2021  Updated Objective Findings:  None Today  TREATMENT:     THERAPEUTIC EXERCISE: (15 minutes):  Exercises per grid below to improve mobility, strength, balance and coordination. Required minimal verbal and manual cues to promote proper body alignment, promote proper body posture and promote proper body mechanics. Progressed resistance, range, repetitions and complexity of movement as indicated. Date:  2021   Activity/Exercise Parameters   Nustep Level 3  5 minutes   Step ups 6 inch step x 10 reps each leg with rail   Sit to stand 2X 10 reps from chair  with no UE assist     Standing B LE: hip flexion, hip abduction, hip extension, knee flexion, ankle dorsiflexion & plantarflexion X 10 reps each leg, each exercise       Neuromuscular Re-education (  25 minutes):  Exercise/activities per grid below to improve balance, coordination, kinesthetic sense, posture and proprioception. Required minimal verbal cues to promote static and dynamic balance in standing.   Balance/Vestibular Treatment:   Activity   Date  2021   Activity/Exercise   Sets/reps/  equipment   Walking with head turns Walking with head up & down        Step overs     Over 1/2 foam roll  15 reps forward  15 reps laterally   Step taps     6 inch step   20 reps forward  20 reps cross   Marching   In hodgson x 4 laps   Sidestepping   In hodgson x 4 laps   Crossovers      [place]      Walking  backwards        Tandem walking      Weaving in/out of cones        Picking up cones        Sports cord        [employer]      Kick ball      Figure 8s       Circles right/left      Walking with 360 degree turns      Spirals      Weight shifting:    Left & Right        Weight shifting: Forward & Backward         Static Standing Balance        Standing with feet apart     [place] foam eyes open and closed   Standing with feet together        Standing with feet semitandem   [place] foam eyes open and closed   Standing with feet tandem      Single leg stance        Treatment/Session Summary:    · Response to Treatment:  Patient tolerated treatment well. Patient is demonstrating improving mobility, strength, and activity tolerance. Gave pt a copy of HEP. Discharge next session with HEP. · Equipment provided today:  None today  · Recommendations/Intent for next treatment session: Next visit will focus on improving overall mobility, safety, and balance.     Total Treatment Billable Duration:  40 minutes   PT Patient Time In/Time Out  Time In: 1300  Time Out: [address] [facility], PT    Future Appointments   Date Time Provider Milla Betancur   5/27/2021  1:00 PM Amparo Wiseman, PT [facility]   6/14/2021 10:40 AM GCC OUTREACH INSURANCE [facility]   6/14/2021 11:10 AM Jocelyn Cuevas MD [facility]   6/24/2021 10:00 AM Rafaela Aceves MD [facility] BEHAVIORAL HEALTH SERVICES [facility]   7/29/2021 11:30 AM Daniel Ontiveros MD [facility]

## 2021-05-27 ENCOUNTER — HOSPITAL ENCOUNTER (OUTPATIENT)
Dept: PHYSICAL THERAPY | Age: 81
Discharge: HOME | End: 2021-05-27
Attending: NURSE PRACTITIONER
Payer: MEDICARE

## 2021-05-27 PROCEDURE — 97110 THERAPEUTIC EXERCISES: CPT

## 2021-05-27 PROCEDURE — 97112 NEUROMUSCULAR REEDUCATION: CPT

## 2021-05-27 NOTE — PROGRESS NOTES
Maria Elena Garcia  : 1940  [place]  Secondary: Hernán Melendrez [address] at [place] [id number] [facility], [address] [place] [address],[address], [facility].  [phone]   [phone]        OUTPATIENT PHYSICAL THERAPY: Daily Treatment Note 2021  Visit Count:  13    ICD-10: Treatment Diagnosis:    Other abnormalities of gait and mobility (R26.89)   Difficulty in walking, not elsewhere classified (R26.2)   Precautions/Allergies:   Betadine [povidone-iodine], Pcn [penicillins], and Pentasa [mesalamine]   TREATMENT PLAN:  Effective Dates: 3/4/2021 TO 21   Frequency/Duration: discharge today. Pre-treatment Symptoms/Complaints:  2021: \"Doing well. Today is my graduation day. \"  Pain: Initial:   0 Post Session:  0/10   Medications Last Reviewed:  2021  Updated Objective Findings:  None Today  TREATMENT:     THERAPEUTIC EXERCISE: (15 minutes):  Exercises per grid below to improve mobility, strength, balance and coordination. Required minimal verbal and manual cues to promote proper body alignment, promote proper body posture and promote proper body mechanics. Progressed resistance, range, repetitions and complexity of movement as indicated. Date:  2021   Activity/Exercise Parameters   Nustep Level 4  5 minutes   Step ups 6 inch step x 10 reps each leg with rail   Sit to stand 2X 10 reps from chair  with no UE assist     Standing B LE: hip flexion, hip abduction, hip extension, knee flexion, ankle dorsiflexion & plantarflexion X 10 reps each leg, each exercise       Neuromuscular Re-education (  25 minutes):  Exercise/activities per grid below to improve balance, coordination, kinesthetic sense, posture and proprioception. Required minimal verbal cues to promote static and dynamic balance in standing.   Balance/Vestibular Treatment:   Activity   Date  2021   Activity/Exercise   Sets/reps/  equipment   Walking with head turns Walking with head up & down        Step overs     Over 1/2 foam roll  15 reps forward  15 reps laterally   Step taps     6 inch step   20 reps forward  20 reps cross   Marching   In hodgson x 4 laps   Sidestepping   In hodgson x 4 laps   Crossovers      [place]      Walking  backwards        Tandem walking      Weaving in/out of cones        Picking up cones        Sports cord        [employer]      Kick ball      Figure 8s       Circles right/left      Walking with 360 degree turns      Spirals      Weight shifting:    Left & Right        Weight shifting: Forward & Backward         Static Standing Balance        Standing with feet apart     Grey foam eyes open and closed   Standing with feet together        Standing with feet semitandem   Grey foam eyes open and closed   Standing with feet tandem      Single leg stance        Treatment/Session Summary:    · Response to Treatment:  Patient tolerated treatment well. She has made good progress in improving strength, balance, and mobility. Discharge with HEP, but told patient to get a new order from PT to resume if and when she feels like she needs more PT. .   · Equipment provided today:  None today  · Recommendations/Intent for next treatment session: Next visit will focus on improving overall mobility, safety, and balance.     Total Treatment Billable Duration:  40 minutes   PT Patient Time In/Time Out  Time In: 1300  Time Out: [address] Julienne Walker PT    Future Appointments   Date Time Provider Milla Betancur   6/14/2021 10:40 AM [facility]REACH [facility]   6/14/2021 11:10 AM Anastacia Sheridan MD [facility]   6/24/2021 10:00 AM Shanita Kline MD [facility] BEHAVIORAL HEALTH SERVICES [facility]   7/29/2021 11:30 AM MD SHERITA Brice

## 2021-05-27 NOTE — THERAPY DISCHARGE
Jason Esparza : 1940 [place] Secondary: Hernán Melendrez [address] at [place] [id number] [facility], [address], [place] [address]. [phone]   [phone] OUTPATIENT PHYSICAL THERAPY:Discharge Summary 2021 ICD-10: Treatment Diagnosis:  
 Other abnormalities of gait and mobility (R26.89)  Difficulty in walking, not elsewhere classified (R26.2) Precautions/Allergies:  
Betadine [povidone-iodine], Pcn [penicillins], and Pentasa [mesalamine] TREATMENT PLAN: 
Effective Dates: 3/4/2021 TO 2021 (90 days). Frequency/Duration: discharge today MEDICAL/REFERRING DIAGNOSIS: 
Parkinson's disease [G20] DATE OF ONSET: Chronic REFERRING PHYSICIAN: Jany Keyes NP MD Orders: Evaluate and Treat Return MD Appointment: TBD  
 
DISCHARGE NOTE 21:  Ms. Amanda Doan has attended 10 PT sessions from 3/4/21 to 21 for decreased mobility, decreased strength, and decreased balance secondary to degenerative changes. Patient has made good progress with PT and has improved in her balance scores on the Pride Balance Scale and the Dynamic Gait Index as seen below. Patient has met goals. Patient is independent in HEP. We will discharge patient at this time to continue with HEP, but would be happy to reassess patient in the future with a new order if she has any further PT needs. Thank you. PROBLEM LIST (Impacting functional limitations): 1. Decreased Strength 2. Decreased ADL/Functional Activities 3. Decreased Transfer Abilities 4. Decreased Ambulation Ability/Technique 5. Decreased Balance 6. Decreased Activity Tolerance INTERVENTIONS PLANNED: (Treatment may consist of any combination of the following) 1. Balance Exercise 2. Gait Training 3. Home Exercise Program (HEP) 4. Neuromuscular Re-education/Strengthening 5. Range of Motion (ROM) 6. Therapeutic Activites 7. Therapeutic Exercise/Strengthening GOALS: (Goals have been discussed and agreed upon with patient.) Short-Term Functional Goals: Time Frame: 30 days 1. Patient will be independent with home exercise program without exacerbation of symptoms or cueing needed. MET Discharge Goals: Time Frame: 90 days 1. Patient will be independent with all ADLs with minimal fear of falling and loss of balance with daily tasks. MET 2. Patient will report no fear avoidance with social or recreational activities due to fear of falling. MET 
3. Patient will score greater than or equal to 45/56 on Pride Balance Scale with minimal effect of imbalance on patient's ability to manage every day life activities. MET 
4. Patient will score greater than or equal to 19/24 on Dynamic Gait Index with minimal effect of imbalance on patient's ability to manage every day life activities. MET 
 
OUTCOME MEASURE:  
Tool Used: Pride Balance Scale Score:  Initial: 33/56 Most Recent: 48/56 (Date: 5/27/21 ) Interpretation of Score: Each section is scored on a 0-4 scale, 0 representing the patients inability to perform the task and 4 representing independence. The scores of each section are added together for a total score of 56. The higher the patients score, the more independent the patient is. Any score below 45 indicates increased risk for falls. Tool Used: Dynamic Gait Index Score:  Initial: 13/24 Most Recent: 20/24 (Date:5/27/21 ) Interpretation of Score: Each section is scored on a 0-3 scale, 0 representing the patients inability to perform the task and 3 representing independence. The scores of each section are added together for a total score of 24. Any score below 19 indicates increased risk for falls. MEDICAL NECESSITY:  
· Patient is expected to demonstrate progress in strength, range of motion, balance, coordination and functional technique to improve safety during daily activities.  
· Patient demonstrates fair rehab potential due to higher previous functional level. 
· Skilled intervention continues to be required due to decreased functional mobility. REASON FOR SERVICES/OTHER COMMENTS: 
· Patient continues to demonstrate capacity to improve overall functional mobility which will increase independence and increase safety. Total Duration: PT Patient Time In/Time Out Time In: 1300 Time Out: 1340 PAIN/SUBJECTIVE:  
Initial:   0 Post Session:  0/10 HISTORY:  
History of Injury/Illness (Reason for Referral): 
Patient reports she is returning to therapy because she is having some dizziness. Her niece reports this is due more to a cardiac issue and losing consciousness. Ms. Anastacia Ruby reports that she has been having difficulty moving around as well as maintaining her balance. She reports she fell about two months ago due to the dizziness. She reports that she always tends to fall forward. She reports the dizziness will come on with standing or when she is lying down. She reports that she is doing better to recognize the dizziness and stop what she is doing so she doesn't fall. She reports that she has also not been doing her exercises. She reports that she does get up and move around the house, but does not go outside. She reports she would like to work on improving her overall mobility and strength. Past Medical History/Comorbidities: Ms. Anastacia Ruby  has a past medical history of Anxiety, Cancer ([address] Cancer of right breast ([address] (Dx 04/2019), Crohn's disease ([address] Heart failure ([address] Hypertension, Psychiatric disorder, and Thyroid disease. Ms. Anastacia Ruby  has a past surgical history that includes hx hysterectomy; hx lumbar laminectomy; ir insert tunl cvc w port over 5 years (4/18/2019); hx vascular access; and ir remove tunl cvad w port/pump (8/7/2020). Social History/Living Environment:  
Home Environment: Private residence Living Alone: No 
Support Systems: Family member(s), Friends \ neighbors Prior Level of Function/Work/Activity: 
Lives with niece, Samia 
Dominant Side:  
      RIGHT Personal Factors:   
      Sex:  female Age:  79 y.o. Ambulatory/Rehab Services H2 Model Falls Risk Assessment Risk Factors: 
     (1)  Dizziness/Vertigo 
     (5)  History of Recent Falls [w/in 3 months] Ability to Rise from Chair: 
     (1)  Pushes up, successful in one attempt Falls Prevention Plan: No modifications necessary Total: (5 or greater = High Risk): 7  
©2010 [facility] Patent #[id number]. Federal Law prohibits the replication, distribution or use without written permission from [facility] [employer] Current Medications:   
  
Current Outpatient Medications:  
  carbidopa-levodopa ER (SINEMET CR)  mg per tablet, TAKE 1 TABLET BY MOUTH DAILY AT 9PM, Disp: 30 Tab, Rfl: 0 
  anastrozole (ARIMIDEX) 1 mg tablet, TAKE 1 TABLET DAILY, Disp: 90 Tab, Rfl: 3 
  carbidopa-levodopa (Sinemet)  mg per tablet, 1.5 tabs TID every 5 hours at 7 am, noon and 7 pm, Disp: 405 Tab, Rfl: 3 
  PARoxetine CR (PAXIL-CR) 25 mg tablet, TAKE 1 TABLET BY MOUTH EVERY DAY, Disp: , Rfl:  
  levomefolate calcium (DEPLIN PO), Take 7.5 mg by mouth daily. , Disp: , Rfl:  
  cholecalciferol (VITAMIN D3) (2,000 UNITS /50 MCG) cap capsule, Take  by mouth two (2) times a day., Disp: , Rfl:  
  DISABLED PLACARD (DISABLED PLACARD) DMV, Handicap placard, Disp: 1 Each, Rfl: 0 
  potassium chloride (KLOR-CON) 10 mEq tablet, Take 2 Tabs by mouth two (2) times a day. (Patient taking differently: Take 10 mEq by mouth daily.), Disp: 30 Tab, Rfl: 3 
  LORazepam (ATIVAN) 1 mg tablet, Take 1.5 mg by mouth three (3) times daily. , Disp: , Rfl:  
  amLODIPine (NORVASC) 10 mg tablet, Take 5 mg by mouth daily. , Disp: , Rfl:  
  losartan (COZAAR) 50 mg tablet, Take 25 mg by mouth daily. , Disp: , Rfl:  
  levothyroxine (SYNTHROID) 75 mcg tablet, Take  by mouth Daily (before breakfast). , Disp: , Rfl:  
  esomeprazole (NEXIUM) 40 mg capsule, Take  by mouth daily. , Disp: , Rfl:  
  oxybutynin chloride XL (DITROPAN XL) 10 mg CR tablet, Take 10 mg by mouth daily. , Disp: , Rfl:  
  melatonin 3 mg tablet, Take 6 mg by mouth nightly., Disp: , Rfl:  
  OLANZapine (ZYPREXA) 5 mg tablet, Take 5 mg by mouth two (2) times a day., Disp: , Rfl:   
Date Last Reviewed:  5/27/2021 Number of Personal Factors/Comorbidities that affect the Plan of Care: 1-2: MODERATE COMPLEXITY EXAMINATION:  
Observation/Orthostatic Postural Assessment:   
      Posture Assessment: Rounded shoulders, Forward head Functional Mobility:  
Gait/Mobility:  
 Base of Support: Center of gravity altered Speed/Yamilex: Pace decreased (<100 feet/min) Step Length: Left shortened, Right shortened Swing Pattern: Left asymmetrical, Right asymmetrical 
Stance: Left decreased, Right decreased Gait Abnormalities: Antalgic Ambulation - Level of Assistance: Independent · Interventions: Verbal cues, Safety awareness training Transfers:   
 Sit to Stand: Modified independent Stand to Sit: Modified independent Stand Pivot Transfers: Modified independent Bed to Chair: Modified independent Lateral Transfers: Modified independent Bed Mobility:   
 Rolling: Modified independent Supine to Sit: Modified independent Sit to Supine: Modified independent Scooting: Modified independent Strength:   
      UE STRENGTH: 3/5 shoulder flexion, 3/5 shoulder abduction, 3/5 shoulder extension, 3/5 elbow flexion, 3/5 elbow extension. LE STRENGTH:  3/5 hip flexion, 3/5 hip abduction, 3/5 hip adduction, 3/5 hip extension, 3/5 knee extension, 3/5 knee flexion, 2/5 ankle dorsiflexion, 2/5 ankle plantarflexion, 2/5 ankle inversion, 2/5 ankle eversion. 5/27/21: UEs: 4/5 LEs: 4+/5 Sensation:  
      Intact Postural Control & Balance: · Pride Balance Scale:  33/56.   (A score less than 45/56 indicates high risk of falls) · Dynamic Gait Index:  13/24.    (A score less than or equal to19/24 is abnormal and predictive of falls) Body Structures Involved: 1. Nerves 2. Muscles Body Functions Affected: 1. Neuromusculoskeletal 
2. Movement Related Activities and Participation Affected: 1. Mobility 2. Self Care Number of elements (examined above) that affect the Plan of Care: 4+: HIGH COMPLEXITY CLINICAL PRESENTATION:  
Presentation: Evolving clinical presentation with changing clinical characteristics: MODERATE COMPLEXITY CLINICAL DECISION MAKING:  
Use of outcome tool(s) and clinical judgement create a POC that gives a: Questionable prediction of patient's progress: MODERATE COMPLEXITY

## 2021-06-24 ENCOUNTER — APPOINTMENT (OUTPATIENT)
Dept: RADIATION ONCOLOGY | Age: 81
End: 2021-06-24

## 2021-07-20 NOTE — PROGRESS NOTES
Patient: Lizandro Adam MRN: [id number]  SSN: [id number]    YOB: 1940  Age: 79 y.o. Sex: female      Documentation:  DIAGNOSIS: invasive ductal carcinoma of the right breast, intermediate grade, %, NJ 10%, HER-2 equivocal (2+), but HER-2 positive by FISH, clinical T1c N0, now s/p lumpectomy and SLN biopsy revealing 1.2 cm residual IDC, 0/6 SLN, [id number](sn)     PREVIOUS TREATMENT:    1) neoadjuvant TCH  2) lumpectomy 9/11/2019  3) radiation right breast. Dose: Right breast: 4256 cGy in 16 fractions, Right breast boost: 1000 cGy in 4 fractions. Treatment dates: 10/3/2019 - 10/30/2019.       HISTORY OF PRESENT ILLNESS:  Lizandro Adam is a [de-identified] y.o. female seen initially by Dr. More Villanueva at the request of Dr. Kj Proctor. She has a medical history significant for anemia, anxiety, Crohn's disease, GERD, depression, melanoma, hypertension, hypothyroidism, hyperlipidemia. She was found to have an abnormality in the right breast on routine screening mammogram. She has a remote history of breast biopsy with benign finding. Right breast diagnostic mammogram and ultrasound on 03/18/19 revealed spiculated 1.5 cm mass in the upper inner quadrant of the right breast.  Ultrasound revealed an irregular, hypoechoic, approximately 1.5 cm mass at the 2 o'clock position in the right breast 3 cm from the nipple corresponding to mammographic abnormality. This was felt to be highly suggestive of malignancy, BI-RADS 5. Biopsy on 03/29/19 revealed invasive ductal carcinoma, grade 2, %, NJ 10%, HER-2 equivocal (2+), but HER-2 positive by FISH. She discussed management options with Dr. Kj Proctor and was recommended to undergo chemotherapy given her HER-2 positive status. He recommended neoadjuvant TCH chemotherapy. MRI of the breasts on 04/24/19 showed a 1.8 x 1.6 x 2 cm spiculated appearing enhancing mass underlying the right breast skin marker consistent with the patient's known IDC.  There were no other areas of abnormality in either breast.  There was no evidence of lymphadenopathy.      Dr. Jordan Eaton met with her on 04/25/19 to discuss the potential role of radiation therapy in management of this stage I HER-2 positive right breast cancer. He discussed that if she had a pathologic CR to chemotherapy, then omission of radiation therapy would be reasonable. However, if she had considerable residual disease or if she was unable to tolerate chemotherapy, then adjuvant radiation therapy would be recommended.      US after cycle 3 showed significant DC. She completed 6 cycles of TCH chemotherapy. She was then recommended to complete a year of Herceptin. MRI of the breasts on 08/27/19 showed decrease in size of the right medial 2:00 breast cancer now measuring 1.0 x 0.8 x 1.4 cm compared to 1.8 x 1.6 x 2.0 cm previously. There was no evidence of lymphadenopathy and no new suspicious findings.      She received an infusion of Herceptin on 09/06/19 and is to receive treatment q 3 weeks.      On 09/11/19 she underwent lumpectomy and sentinel lymph node biopsy. Pathology revealed invasive ductal carcinoma, 12 mm, grade 2. Margins were negative. 0/6 sentinel lymph nodes were involved. Staging was [id number](sn).      TREATMENT COURSE:  Chana Edge did well without complaints. She continued to tolerate radiation therapy without difficulty on week 3. She had a fall at home while going to the bathroom and hit her tailbone and head on the floor. She was a bit sore, but without significant injuries. Week 4 doing well. No complaints.       INTERVAL HISTORY:     01/07/20: Ms Radames Griffith returns 10 weeks out from completing radiation therapy. She tolerated radiation extremely well. At this time, Ms Radames Griffith is doing well. Reports no breast issues. Now on Arimidex. Good appetite. Energy level at her baseline. No complaints.      04/21/20: Virtual call:  Ms Radames Griffith is now 6 months out from completing radiation therapy to the right breast. At this time, Ms Radames Griffith is doing well. She reports no breast issues. Recent mammogram revealed post treatment related changes, no new findings. Recommending right breast mammogram as lumpectomy follow up. No complaints. 10/30/2020 Here today for follow up. 1 year out from radiation therapy. She has been doing well since last seen. She has no new complaints or concerns. Energy is good. No new breast concerns or change. No ROM difficulties. Recent mammogram without signs of malignancy. 7/21/2021: Ms. Tamika Cline presents 21 months after completion of radiation therapy. On 7/7/2021 she underwent a diagnostic bilateral mammogram which demonstrated postlumpectomy and postradiation changes in the right breast without dominant mass lesion or new calcifications in either breast. She continues on Arimidex without toxicities. She denies breast pain, tenderness, soreness, skin changes, lymphedema, decreased ROM, or other concerns.     OBSTETRIC HISTORY:    Menarche at the age of 11. G2,P0.     Oral Contraceptive Pills:  None  Hormone Replacement Therapy:  None     PAST MEDICAL HISTORY:    Past Medical History:   Diagnosis Date    Anxiety     Cancer ([address]     melanoma    Cancer of right breast ([address] Dx 04/2019    Crohn's disease ([address]     Heart failure ([address]     Hypertension     Psychiatric disorder     anxiety/depression    Thyroid disease       PAST SURGICAL HISTORY:   Past Surgical History:   Procedure Laterality Date    HX HYSTERECTOMY      HX LUMBAR LAMINECTOMY      HX VASCULAR ACCESS      IR INSERT TUNL CVC W PORT OVER 5 YEARS  4/18/2019    IR REMOVE TUNL CVAD W PORT/PUMP  8/7/2020     MEDICATIONS:      Current Outpatient Medications   Medication Instructions    amLODIPine (NORVASC) 5 mg, Oral, DAILY    anastrozole (ARIMIDEX) 1 mg tablet TAKE 1 TABLET DAILY    carbidopa-levodopa (Sinemet)  mg per tablet 1.5 tabs TID every 5 hours at 7 am, noon and 7 pm    carbidopa-levodopa ER (SINEMET CR)  mg per tablet TAKE 1 TABLET BY MOUTH DAILY AT 9PM    cholecalciferol (VITAMIN D3) (2,000 UNITS /50 MCG) cap capsule Oral, 2 TIMES DAILY    DISABLED PLACARD (DISABLED PLACARD) DMV Handicap placard    esomeprazole (NEXIUM) 40 mg capsule Oral, DAILY    levomefolate calcium (DEPLIN PO) 7.5 mg, Oral, DAILY    levothyroxine (SYNTHROID) 75 mcg tablet Oral, DAILY BEFORE BREAKFAST    LORazepam (ATIVAN) 1.5 mg, Oral, 3 TIMES DAILY    losartan (COZAAR) 25 mg, Oral, DAILY    melatonin 6 mg, Oral, EVERY BEDTIME    OLANZapine (ZYPREXA) 5 mg, Oral, 2 TIMES DAILY    oxybutynin chloride XL (DITROPAN XL) 10 mg, Oral, DAILY    PARoxetine CR (PAXIL-CR) 25 mg tablet TAKE 1 TABLET BY MOUTH EVERY DAY    potassium chloride (KLOR-CON) 10 mEq tablet 20 mEq, Oral, 2 TIMES DAILY      ALLERGIES:   Allergies   Allergen Reactions    Betadine [Povidone-Iodine] Hives    Pcn [Penicillins] Rash    Pentasa [Mesalamine] Other (comments)     Fever/chills     SOCIAL HISTORY:   Social History     Socioeconomic History    Marital status:      Spouse name: Not on file    Number of children: Not on file    Years of education: Not on file    Highest education level: Not on file   Occupational History    Not on file   Tobacco Use    Smoking status: Never Smoker    Smokeless tobacco: Never Used   Substance and Sexual Activity    Alcohol use: Never    Drug use: Never    Sexual activity: Never   Other Topics Concern     Service Not Asked    Blood Transfusions Not Asked    Caffeine Concern Not Asked    Occupational Exposure Not Asked    Hobby Hazards Not Asked    Sleep Concern Not Asked    Stress Concern Not Asked    Weight Concern Not Asked    Special Diet Not Asked    Back Care Not Asked    Exercise Not Asked    Bike Helmet Not Asked    Seat Belt Not Asked    Self-Exams Not Asked   Social History Narrative    Not on file     Social Determinants of Health     Financial Resource Strain:     Difficulty of Paying Living Expenses: Food Insecurity:     Worried About Running Out of Food in the Last Year:     [address] in the Last Year:    Transportation Needs:     Lack of Transportation (Medical):  Lack of Transportation (Non-Medical):    Physical Activity:     Days of Exercise per Week:     Minutes of Exercise per Session:    Stress:     Feeling of Stress :    Social Connections:     Frequency of Communication with Friends and Family:     Frequency of Social Gatherings with Friends and Family:     Attends [employer] Services:     Active Member of Clubs or Organizations:     Attends Club or Organization Meetings:     Marital Status:    Intimate Partner Violence:     Fear of Current or Ex-Partner:     Emotionally Abused:     Physically Abused:     Sexually Abused:       FAMILY HISTORY:   Family History   Problem Relation Age of Onset    Cancer Sister         melanoma    Cancer Brother         lung      REVIEW OF SYSTEMS: Please see interval history       PHYSICAL EXAMINATION:   General: well developed/nourished adult Female in no acute distress; appears stated age  [de-identified]: normocephalic, atraumatic; EOMI  Neck: supple with full ROM  Respiratory: normal inspiratory effort, no audible wheezes  Extremities: no cyanosis, clubbing, or edema  Musculoskeletal: mobility intact x4; normal ROM in all joints  Skin: no skin lesions identified  Neuro: AOx3; sensation intact x 4; CNII-XII grossly intact  Psych: appropriate affect, insight, and judgement  GI: abdomen soft, non-distended  Breast: deferred given no findings on mammogram and no concerns    PATHOLOGY:    No interval pathology.  RADIOLOGY:      Mammogram report 7/7/2021 personally reviewed as per HPI.     IMPRESSION:  Lizandro Adam is a [de-identified] y.o. female with invasive ductal carcinoma of the right breast, intermediate grade, %, NC 10%, HER-2 equivocal (2+), but HER-2 positive by FISH, clinical T1c N0, now s/p lumpectomy and SLN biopsy revealing 1.2 cm residual IDC, 0/6 SLN, [id number](sn). She was treated with 4256 cGy to the whole breast in 16 fractions of 266 cGy each followed by a boost to the lumpectomy cavity of 1000 cGy in 4 fractions 10/3/2019 - 10/30/2019.      Ms Rut Arellano is now 19 months out from completing radiation therapy to right breast. She is recovering as anticipated from treatment without evidence of recurrent disease.      PLAN:  1) Follow up with Dr. Héctor Seals 7/22/21  2) Follow up radiation oncology in 6 months  3) Repeat mammogram in 1 year

## 2021-07-22 ENCOUNTER — HOSPITAL ENCOUNTER (OUTPATIENT)
Dept: RADIATION ONCOLOGY | Age: 81
Discharge: HOME | End: 2021-07-22
Payer: MEDICARE

## 2021-07-22 VITALS
DIASTOLIC BLOOD PRESSURE: 78 MMHG | RESPIRATION RATE: 16 BRPM | TEMPERATURE: 98 F | BODY MASS INDEX: 28.91 KG/M2 | HEART RATE: 76 BPM | SYSTOLIC BLOOD PRESSURE: 168 MMHG | OXYGEN SATURATION: 97 % | WEIGHT: 179.1 LBS

## 2021-07-22 PROCEDURE — 99211 OFF/OP EST MAY X REQ PHY/QHP: CPT

## 2021-07-22 RX ORDER — BUPROPION HYDROCHLORIDE 100 MG/1
100 TABLET ORAL 2 TIMES DAILY
COMMUNITY

## 2021-07-22 NOTE — NURSE NAVIGATOR
Mammograms being done at [place] [id number].  Will order mammogram in six months with f/u.     Burton Pa RN

## 2021-07-22 NOTE — PROGRESS NOTES
07/22/2021 (Today) - Med Onc Follow Up (Dr Nate Arellano)    RT End: 10/30/2019 (Dr Ebonie Morrissey)    Mammogram (07/07/20121): Negative    Patient is currently taking Arimidex    09/11/2019 - Right Lumpectomy - Neoadjuvant [facility]

## 2022-01-27 ENCOUNTER — APPOINTMENT (OUTPATIENT)
Dept: RADIATION ONCOLOGY | Age: 82
End: 2022-01-27

## 2022-02-04 ENCOUNTER — HOSPITAL ENCOUNTER (OUTPATIENT)
Dept: MAMMOGRAPHY | Age: 82
Discharge: HOME | End: 2022-02-04
Attending: INTERNAL MEDICINE
Payer: MEDICARE

## 2022-02-04 DIAGNOSIS — Z12.31 SCREENING MAMMOGRAM, ENCOUNTER FOR: ICD-10-CM

## 2022-02-04 DIAGNOSIS — Z17.0 MALIGNANT NEOPLASM OF NIPPLE OF RIGHT BREAST IN FEMALE, ESTROGEN RECEPTOR POSITIVE (HCC): ICD-10-CM

## 2022-02-04 DIAGNOSIS — C50.011 MALIGNANT NEOPLASM OF NIPPLE OF RIGHT BREAST IN FEMALE, ESTROGEN RECEPTOR POSITIVE (HCC): ICD-10-CM

## 2022-02-04 PROCEDURE — 77065 DX MAMMO INCL CAD UNI: CPT

## 2022-02-10 ENCOUNTER — HOSPITAL ENCOUNTER (OUTPATIENT)
Dept: LAB | Age: 82
Discharge: HOME | End: 2022-02-10
Payer: MEDICARE

## 2022-02-10 DIAGNOSIS — Z17.0 MALIGNANT NEOPLASM OF RIGHT BREAST IN FEMALE, ESTROGEN RECEPTOR POSITIVE, UNSPECIFIED SITE OF BREAST (HCC): ICD-10-CM

## 2022-02-10 DIAGNOSIS — C50.911 MALIGNANT NEOPLASM OF RIGHT BREAST IN FEMALE, ESTROGEN RECEPTOR POSITIVE, UNSPECIFIED SITE OF BREAST (HCC): ICD-10-CM

## 2022-02-10 LAB
ALBUMIN SERPL-MCNC: 3.1 G/DL (ref 3.2–4.6)
ALBUMIN/GLOB SERPL: 0.8 {RATIO} (ref 1.2–3.5)
ALP SERPL-CCNC: 158 U/L (ref 50–136)
ALT SERPL-CCNC: 7 U/L (ref 12–65)
ANION GAP SERPL CALC-SCNC: 5 MMOL/L (ref 7–16)
AST SERPL-CCNC: 18 U/L (ref 15–37)
BASOPHILS # BLD: 0.1 K/UL (ref 0–0.2)
BASOPHILS NFR BLD: 1 % (ref 0–2)
BILIRUB SERPL-MCNC: 0.4 MG/DL (ref 0.2–1.1)
BUN SERPL-MCNC: 16 MG/DL (ref 8–23)
CALCIUM SERPL-MCNC: 9.3 MG/DL (ref 8.3–10.4)
CHLORIDE SERPL-SCNC: 109 MMOL/L (ref 98–107)
CO2 SERPL-SCNC: 25 MMOL/L (ref 21–32)
CREAT SERPL-MCNC: 1 MG/DL (ref 0.6–1)
DIFFERENTIAL METHOD BLD: ABNORMAL
EOSINOPHIL # BLD: 0.1 K/UL (ref 0–0.8)
EOSINOPHIL NFR BLD: 2 % (ref 0.5–7.8)
ERYTHROCYTE [DISTWIDTH] IN BLOOD BY AUTOMATED COUNT: 17.2 % (ref 11.9–14.6)
GLOBULIN SER CALC-MCNC: 4 G/DL (ref 2.3–3.5)
GLUCOSE SERPL-MCNC: 108 MG/DL (ref 65–100)
HCT VFR BLD AUTO: 37.8 % (ref 35.8–46.3)
HGB BLD-MCNC: 11.6 G/DL (ref 11.7–15.4)
IMM GRANULOCYTES # BLD AUTO: 0 K/UL (ref 0–0.5)
IMM GRANULOCYTES NFR BLD AUTO: 1 % (ref 0–5)
LYMPHOCYTES # BLD: 1.1 K/UL (ref 0.5–4.6)
LYMPHOCYTES NFR BLD: 24 % (ref 13–44)
MCH RBC QN AUTO: 26 PG (ref 26.1–32.9)
MCHC RBC AUTO-ENTMCNC: 30.7 G/DL (ref 31.4–35)
MCV RBC AUTO: 84.6 FL (ref 79.6–97.8)
MONOCYTES # BLD: 0.4 K/UL (ref 0.1–1.3)
MONOCYTES NFR BLD: 9 % (ref 4–12)
NEUTS SEG # BLD: 3 K/UL (ref 1.7–8.2)
NEUTS SEG NFR BLD: 63 % (ref 43–78)
NRBC # BLD: 0 K/UL (ref 0–0.2)
PLATELET # BLD AUTO: 156 K/UL (ref 150–450)
PMV BLD AUTO: 9.9 FL (ref 9.4–12.3)
POTASSIUM SERPL-SCNC: 4 MMOL/L (ref 3.5–5.1)
PROT SERPL-MCNC: 7.1 G/DL (ref 6.3–8.2)
RBC # BLD AUTO: 4.47 M/UL (ref 4.05–5.2)
SODIUM SERPL-SCNC: 139 MMOL/L (ref 136–145)
WBC # BLD AUTO: 4.6 K/UL (ref 4.3–11.1)

## 2022-02-10 PROCEDURE — 85025 COMPLETE CBC W/AUTO DIFF WBC: CPT

## 2022-02-10 PROCEDURE — 36415 COLL VENOUS BLD VENIPUNCTURE: CPT

## 2022-02-10 PROCEDURE — 80053 COMPREHEN METABOLIC PANEL: CPT

## 2022-03-18 PROBLEM — R29.3 POSTURAL IMBALANCE: Status: ACTIVE | Noted: 2019-09-10

## 2022-03-18 PROBLEM — R11.0 NAUSEA: Status: ACTIVE | Noted: 2019-05-30

## 2022-03-18 PROBLEM — M85.88 OSTEOPENIA OF SPINE: Status: ACTIVE | Noted: 2020-01-10

## 2022-03-19 PROBLEM — R55 SYNCOPE: Status: ACTIVE | Noted: 2021-02-21

## 2022-03-19 PROBLEM — R42 VERTIGO: Status: ACTIVE | Noted: 2021-02-21

## 2022-03-19 PROBLEM — G20 PARKINSON DISEASE (HCC): Status: ACTIVE | Noted: 2019-09-10

## 2022-03-19 PROBLEM — G20.A1 PARKINSON DISEASE: Status: ACTIVE | Noted: 2019-09-10

## 2022-03-19 PROBLEM — R25.1 TREMOR: Status: ACTIVE | Noted: 2019-09-10

## 2022-03-19 PROBLEM — F41.9 ANXIETY AND DEPRESSION: Status: ACTIVE | Noted: 2019-09-10

## 2022-03-19 PROBLEM — F32.A ANXIETY AND DEPRESSION: Status: ACTIVE | Noted: 2019-09-10

## 2022-03-19 PROBLEM — E86.0 DEHYDRATION: Status: ACTIVE | Noted: 2019-05-30

## 2022-03-20 PROBLEM — R35.1 NOCTURIA MORE THAN TWICE PER NIGHT: Status: ACTIVE | Noted: 2021-02-21

## 2022-03-20 PROBLEM — Z17.0 MALIGNANT NEOPLASM OF RIGHT BREAST IN FEMALE, ESTROGEN RECEPTOR POSITIVE (HCC): Status: ACTIVE | Noted: 2019-04-29

## 2022-03-20 PROBLEM — R49.8 HYPOPHONIA: Status: ACTIVE | Noted: 2019-09-10

## 2022-03-20 PROBLEM — C50.911 MALIGNANT NEOPLASM OF RIGHT BREAST IN FEMALE, ESTROGEN RECEPTOR POSITIVE (HCC): Status: ACTIVE | Noted: 2019-04-29

## 2022-05-11 DIAGNOSIS — C50.911 MALIGNANT NEOPLASM OF RIGHT BREAST IN FEMALE, ESTROGEN RECEPTOR POSITIVE, UNSPECIFIED SITE OF BREAST (HCC): Primary | ICD-10-CM

## 2022-05-11 DIAGNOSIS — Z17.0 MALIGNANT NEOPLASM OF RIGHT BREAST IN FEMALE, ESTROGEN RECEPTOR POSITIVE, UNSPECIFIED SITE OF BREAST (HCC): Primary | ICD-10-CM

## 2022-07-22 ENCOUNTER — HOSPITAL ENCOUNTER (OUTPATIENT)
Dept: MAMMOGRAPHY | Age: 82
Discharge: HOME | End: 2022-07-25
Payer: MEDICARE

## 2022-07-22 DIAGNOSIS — Z79.811 LONG TERM (CURRENT) USE OF AROMATASE INHIBITORS: ICD-10-CM

## 2022-07-22 PROCEDURE — 77080 DXA BONE DENSITY AXIAL: CPT

## 2022-08-10 ENCOUNTER — APPOINTMENT (OUTPATIENT)
Dept: MAMMOGRAPHY | Age: 82
End: 2022-08-10
Payer: MEDICARE

## 2022-08-10 ENCOUNTER — HOSPITAL ENCOUNTER (OUTPATIENT)
Dept: MAMMOGRAPHY | Age: 82
Discharge: HOME | End: 2022-08-13
Payer: MEDICARE

## 2022-08-10 DIAGNOSIS — C50.911: ICD-10-CM

## 2022-08-10 DIAGNOSIS — C50.911 MALIGNANT NEOPLASM OF RIGHT BREAST IN FEMALE, ESTROGEN RECEPTOR POSITIVE, UNSPECIFIED SITE OF BREAST (HCC): ICD-10-CM

## 2022-08-10 DIAGNOSIS — Z17.0 MALIGNANT NEOPLASM OF RIGHT BREAST IN FEMALE, ESTROGEN RECEPTOR POSITIVE, UNSPECIFIED SITE OF BREAST (HCC): ICD-10-CM

## 2022-08-10 DIAGNOSIS — Z08 ENCOUNTER FOR FOLLOW-UP EXAMINATION AFTER COMPLETED TREATMENT FOR MALIGNANT NEOPLASM: ICD-10-CM

## 2022-08-10 DIAGNOSIS — Z17.1: ICD-10-CM

## 2022-08-10 PROCEDURE — 77067 SCR MAMMO BI INCL CAD: CPT

## 2022-08-11 ENCOUNTER — OFFICE VISIT (OUTPATIENT)
Dept: ONCOLOGY | Age: 82
End: 2022-08-11
Payer: MEDICARE

## 2022-08-11 ENCOUNTER — HOSPITAL ENCOUNTER (OUTPATIENT)
Dept: LAB | Age: 82
Discharge: HOME | End: 2022-08-14
Payer: MEDICARE

## 2022-08-11 VITALS
BODY MASS INDEX: 29.83 KG/M2 | RESPIRATION RATE: 16 BRPM | HEART RATE: 82 BPM | SYSTOLIC BLOOD PRESSURE: 132 MMHG | DIASTOLIC BLOOD PRESSURE: 63 MMHG | TEMPERATURE: 98 F | OXYGEN SATURATION: 96 % | WEIGHT: 182 LBS

## 2022-08-11 DIAGNOSIS — R42 DIZZINESS: ICD-10-CM

## 2022-08-11 DIAGNOSIS — Z17.0 MALIGNANT NEOPLASM OF RIGHT BREAST IN FEMALE, ESTROGEN RECEPTOR POSITIVE, UNSPECIFIED SITE OF BREAST (HCC): ICD-10-CM

## 2022-08-11 DIAGNOSIS — C50.911 MALIGNANT NEOPLASM OF RIGHT BREAST IN FEMALE, ESTROGEN RECEPTOR POSITIVE, UNSPECIFIED SITE OF BREAST (HCC): ICD-10-CM

## 2022-08-11 DIAGNOSIS — Z79.811 AROMATASE INHIBITOR USE: ICD-10-CM

## 2022-08-11 DIAGNOSIS — Z17.0 MALIGNANT NEOPLASM OF RIGHT BREAST IN FEMALE, ESTROGEN RECEPTOR POSITIVE, UNSPECIFIED SITE OF BREAST (HCC): Primary | ICD-10-CM

## 2022-08-11 DIAGNOSIS — C50.911 MALIGNANT NEOPLASM OF RIGHT BREAST IN FEMALE, ESTROGEN RECEPTOR POSITIVE, UNSPECIFIED SITE OF BREAST (HCC): Primary | ICD-10-CM

## 2022-08-11 LAB
ALBUMIN SERPL-MCNC: 3.3 G/DL (ref 3.2–4.6)
ALBUMIN/GLOB SERPL: 0.8 {RATIO} (ref 1.2–3.5)
ALP SERPL-CCNC: 259 U/L (ref 50–136)
ALT SERPL-CCNC: 8 U/L (ref 12–65)
ANION GAP SERPL CALC-SCNC: 10 MMOL/L (ref 7–16)
AST SERPL-CCNC: 15 U/L (ref 15–37)
BASOPHILS # BLD: 0 K/UL (ref 0–0.2)
BASOPHILS NFR BLD: 1 % (ref 0–2)
BILIRUB SERPL-MCNC: 0.4 MG/DL (ref 0.2–1.1)
BUN SERPL-MCNC: 18 MG/DL (ref 8–23)
CALCIUM SERPL-MCNC: 9.1 MG/DL (ref 8.3–10.4)
CHLORIDE SERPL-SCNC: 108 MMOL/L (ref 98–107)
CO2 SERPL-SCNC: 20 MMOL/L (ref 21–32)
CREAT SERPL-MCNC: 0.9 MG/DL (ref 0.6–1)
DIFFERENTIAL METHOD BLD: ABNORMAL
EOSINOPHIL # BLD: 0.1 K/UL (ref 0–0.8)
EOSINOPHIL NFR BLD: 2 % (ref 0.5–7.8)
ERYTHROCYTE [DISTWIDTH] IN BLOOD BY AUTOMATED COUNT: 17 % (ref 11.9–14.6)
GLOBULIN SER CALC-MCNC: 3.9 G/DL (ref 2.3–3.5)
GLUCOSE SERPL-MCNC: 120 MG/DL (ref 65–100)
HCT VFR BLD AUTO: 37.2 % (ref 35.8–46.3)
HGB BLD-MCNC: 11.4 G/DL (ref 11.7–15.4)
IMM GRANULOCYTES # BLD AUTO: 0 K/UL (ref 0–0.5)
IMM GRANULOCYTES NFR BLD AUTO: 0 % (ref 0–5)
LYMPHOCYTES # BLD: 1.3 K/UL (ref 0.5–4.6)
LYMPHOCYTES NFR BLD: 27 % (ref 13–44)
MCH RBC QN AUTO: 26.1 PG (ref 26.1–32.9)
MCHC RBC AUTO-ENTMCNC: 30.6 G/DL (ref 31.4–35)
MCV RBC AUTO: 85.3 FL (ref 79.6–97.8)
MONOCYTES # BLD: 0.3 K/UL (ref 0.1–1.3)
MONOCYTES NFR BLD: 6 % (ref 4–12)
NEUTS SEG # BLD: 3.1 K/UL (ref 1.7–8.2)
NEUTS SEG NFR BLD: 64 % (ref 43–78)
NRBC # BLD: 0 K/UL (ref 0–0.2)
PLATELET # BLD AUTO: 214 K/UL (ref 150–450)
PMV BLD AUTO: 10 FL (ref 9.4–12.3)
POTASSIUM SERPL-SCNC: 4.1 MMOL/L (ref 3.5–5.1)
PROT SERPL-MCNC: 7.2 G/DL (ref 6.3–8.2)
RBC # BLD AUTO: 4.36 M/UL (ref 4.05–5.2)
SODIUM SERPL-SCNC: 138 MMOL/L (ref 136–145)
WBC # BLD AUTO: 4.8 K/UL (ref 4.3–11.1)

## 2022-08-11 PROCEDURE — 1090F PRES/ABSN URINE INCON ASSESS: CPT | Performed by: INTERNAL MEDICINE

## 2022-08-11 PROCEDURE — 80053 COMPREHEN METABOLIC PANEL: CPT

## 2022-08-11 PROCEDURE — 85025 COMPLETE CBC W/AUTO DIFF WBC: CPT

## 2022-08-11 PROCEDURE — 1123F ACP DISCUSS/DSCN MKR DOCD: CPT | Performed by: INTERNAL MEDICINE

## 2022-08-11 PROCEDURE — G8417 CALC BMI ABV UP PARAM F/U: HCPCS | Performed by: INTERNAL MEDICINE

## 2022-08-11 PROCEDURE — G8427 DOCREV CUR MEDS BY ELIG CLIN: HCPCS | Performed by: INTERNAL MEDICINE

## 2022-08-11 PROCEDURE — 1036F TOBACCO NON-USER: CPT | Performed by: INTERNAL MEDICINE

## 2022-08-11 PROCEDURE — 36415 COLL VENOUS BLD VENIPUNCTURE: CPT

## 2022-08-11 PROCEDURE — 99214 OFFICE O/P EST MOD 30 MIN: CPT | Performed by: INTERNAL MEDICINE

## 2022-08-11 PROCEDURE — G8399 PT W/DXA RESULTS DOCUMENT: HCPCS | Performed by: INTERNAL MEDICINE

## 2022-08-11 RX ORDER — ANASTROZOLE 1 MG/1
1 TABLET ORAL DAILY
Qty: 90 TABLET | Refills: 1 | Status: SHIPPED | OUTPATIENT
Start: 2022-08-11

## 2022-08-11 ASSESSMENT — PATIENT HEALTH QUESTIONNAIRE - PHQ9
6. FEELING BAD ABOUT YOURSELF - OR THAT YOU ARE A FAILURE OR HAVE LET YOURSELF OR YOUR FAMILY DOWN: 0
8. MOVING OR SPEAKING SO SLOWLY THAT OTHER PEOPLE COULD HAVE NOTICED. OR THE OPPOSITE, BEING SO FIGETY OR RESTLESS THAT YOU HAVE BEEN MOVING AROUND A LOT MORE THAN USUAL: 0
2. FEELING DOWN, DEPRESSED OR HOPELESS: 0
7. TROUBLE CONCENTRATING ON THINGS, SUCH AS READING THE NEWSPAPER OR WATCHING TELEVISION: 0
1. LITTLE INTEREST OR PLEASURE IN DOING THINGS: 0
SUM OF ALL RESPONSES TO PHQ QUESTIONS 1-9: 0
4. FEELING TIRED OR HAVING LITTLE ENERGY: 0
10. IF YOU CHECKED OFF ANY PROBLEMS, HOW DIFFICULT HAVE THESE PROBLEMS MADE IT FOR YOU TO DO YOUR WORK, TAKE CARE OF THINGS AT HOME, OR GET ALONG WITH OTHER PEOPLE: 0
SUM OF ALL RESPONSES TO PHQ9 QUESTIONS 1 & 2: 0
SUM OF ALL RESPONSES TO PHQ QUESTIONS 1-9: 0
3. TROUBLE FALLING OR STAYING ASLEEP: 0
SUM OF ALL RESPONSES TO PHQ QUESTIONS 1-9: 0
5. POOR APPETITE OR OVEREATING: 0
SUM OF ALL RESPONSES TO PHQ QUESTIONS 1-9: 0
9. THOUGHTS THAT YOU WOULD BE BETTER OFF DEAD, OR OF HURTING YOURSELF: 0

## 2022-08-11 NOTE — PATIENT INSTRUCTIONS
Patient Instructions from Today's Visit    Reason for Visit:  Follow-up visit for breast cancer     Diagnosis Information:  [email or web address]    Plan:  -Labs look good so far. -Mammogram looked good. -Bone density scan showed osteopenia. Continue taking vitamin D.   -Continue Arimidex.      Follow Up:  -6 months     Recent Lab Results:  Hospital Outpatient Visit on 08/11/2022   Component Date Value Ref Range Status    WBC 08/11/2022 4.8  4.3 - 11.1 K/uL Final    RBC 08/11/2022 4.36  4.05 - 5.2 M/uL Final    Hemoglobin 08/11/2022 11.4 (A) 11.7 - 15.4 g/dL Final    Hematocrit 08/11/2022 37.2  35.8 - 46.3 % Final    MCV 08/11/2022 85.3  79.6 - 97.8 FL Final    MCH 08/11/2022 26.1  26.1 - 32.9 PG Final    MCHC 08/11/2022 30.6 (A) 31.4 - 35.0 g/dL Final    RDW 08/11/2022 17.0 (A) 11.9 - 14.6 % Final    Platelets 56/92/0254 214  150 - 450 K/uL Final    MPV 08/11/2022 10.0  9.4 - 12.3 FL Final    nRBC 08/11/2022 0.00  0.0 - 0.2 K/uL Final    **Note: Absolute NRBC parameter is now reported with Hemogram**    Seg Neutrophils 08/11/2022 64  43 - 78 % Final    Lymphocytes 08/11/2022 27  13 - 44 % Final    Monocytes 08/11/2022 6  4.0 - 12.0 % Final    Eosinophils % 08/11/2022 2  0.5 - 7.8 % Final    Basophils 08/11/2022 1  0.0 - 2.0 % Final    Immature Granulocytes 08/11/2022 0  0.0 - 5.0 % Final    Segs Absolute 08/11/2022 3.1  1.7 - 8.2 K/UL Final    Absolute Lymph # 08/11/2022 1.3  0.5 - 4.6 K/UL Final    Absolute Mono # 08/11/2022 0.3  0.1 - 1.3 K/UL Final    Absolute Eos # 08/11/2022 0.1  0.0 - 0.8 K/UL Final    Basophils Absolute 08/11/2022 0.0  0.0 - 0.2 K/UL Final    Absolute Immature Granulocyte 08/11/2022 0.0  0.0 - 0.5 K/UL Final    Differential Type 08/11/2022 AUTOMATED    Final       Treatment Summary has been discussed and given to patient: N/A      -------------------------------------------------------------------------------------------------------------------  Please call our office at [phone] if you have any  of the following symptoms:   Fever of 100.5 or greater  Chills  Shortness of breath  Swelling or pain in one leg    After office hours an answering service is available and will contact a provider for emergencies or if you are experiencing any of the above symptoms. Patient did not express an interest in My Chart. My Chart log in information explained on the after visit summary printout at the [facility] desk.     Tessa Goldstein RN

## 2022-08-11 NOTE — PROGRESS NOTES
[address] [place] [address] Hematology & Oncology: Office Visit Progress Note      Chief Complaint:     Breast cancer ER+ HER2+      History of Present Illness:   Reason for Referral:  Breast Ca       Referring Provider:  Dr. Jaz Hernandez       Primary Care Provider:  None documented       Family History of Cancer/Hematologic disorders: Personal Hx: Melanoma; Sister, Melanoma; Brother, Lung Ca       Presenting Symptoms:  Abnormal Imaging of R Breast       Ms. Rikki Dominguez is a 79 y.o. female. She presented to Dr. Samia Richards on 3/22/19 for consultation regarding abnormal imaging of routine screening  mammogram.  She reported no abnormalities on SBE. She has had one breast biopsy years ago what was benign. Her ROS was normal.          3/18/19 MAMMOGRAM/US R BREAST   MAMMOGRAM: ML and spot compression views confirm the presence of a spiculated, approximately 1.5 cm mass in the upper inner quadrant of the right breast.  No significant calcifications are present. ULTRASOUND: There is an irregular, hypoechoic, approximately 1.5 cm longest dimension mass at the 2 o'clock position of the breast, 3 cm from nipple, corresponding to the mammographic findings and height is suggestive of malignancy. IMPRESSION:  BI-RADS 5. Highly suspicious of malignancy. Ultrasound guided biopsy should be performed. 3/29/19 PATHOLOGY DIAGNOSIS   RIGHT BREAST MASS, MAMMOTOME BIOPSY:   -Invasive ductal carcinoma   No lymphovascular invasion is identified   ER: Positive 100%   WV: Positive 10%   HER-2: Equivocal (2+)       Notes from referring Provider:  n/a       Other pertinent information:  Non-smoker. Her Med Hx includes anemia, anxiety, Crohns, GERD, depression, heart murmur, melanoma, HTN,  osteoarthritis, sinusitis, insomnia, Hypothyroid, hyperlipidemia, et al.  She receives Humira injections weekly. Interim history update in A/P. Review of Systems:      Constitutional  Denies fever or chills.   Denies weight loss or appetite changes. Denies fatigue. Denies anorexia. HEENT  Denies trauma, bluring vision, hearing loss, ear pain, nosebleeds, sore throat, neck pain and ear discharge. Skin  Denies lesions or rashes. Lungs  Denies shortness of breath, cough, sputum production or hemoptysis. Cardiovascular  Denies chest pain, palpitations, orthopnea, claudication and leg swelling. Gastrointestinal  Denies nausea, vomiting. Denies bloody or black stools. Abdominal pain, constipation, crohns       Denies dysuria, frequency or hesitancy of urination     Neuro  Denies headaches, visual changes or ataxia. Denies dizziness, tingling, tremors, sensory change, speech change, focal weakness and headaches. involumtary tremors         Hematology  Denies nasal/gum bleeding, denies easy bruise   Hx of anemia     Endo  Denies heat/cold intolerance, denies diabetes. thyroid     MSK Fall and fracture. Denies back pain, swollen legs, myalgias. Psychiatric/Behavioral  Denies substance abuse.     Depression, anxiety                Allergies   Allergen Reactions    Mesalamine Other (See Comments)     Fever/chills    Pentosan Polysulfate Sodium Other (See Comments)    Povidone-Iodine Hives    Penicillins Rash     Past Medical History:   Diagnosis Date    Anxiety     Breast cancer ([facility]     Cancer ([facility]     melanoma    Cancer of right breast ([address] Dx 04/2019    Crohn's disease ([address]     Heart failure ([address]     History of chemotherapy     Hypertension     Menopause     Psychiatric disorder     anxiety/depression    Radiation therapy complication     Thyroid disease      Past Surgical History:   Procedure Laterality Date    BREAST BIOPSY      BREAST LUMPECTOMY Right     HYSTERECTOMY (CERVIX STATUS UNKNOWN)      IR PORT PLACEMENT EQUAL OR GREATER THAN 5 YEARS  4/18/2019    IR PORT PLACEMENT EQUAL OR GREATER THAN 5 YEARS  04/18/2019    IR PORT PLACEMENT EQUAL OR GREATER THAN 5 YEARS 4/18/2019 [facility] RADIOLOGY SPECIALS    IR REMOVE TUNNELED VAD W PORT  8/7/2020    LUMBAR LAMINECTOMY      VASCULAR SURGERY       Family History   Problem Relation Age of Onset    Breast Cancer Neg Hx     Cancer Sister         melanoma    Cancer Brother         lung     Social History     Socioeconomic History    Marital status:      Spouse name: Not on file    Number of children: Not on file    Years of education: Not on file    Highest education level: Not on file   Occupational History    Not on file   Tobacco Use    Smoking status: Never    Smokeless tobacco: Never   Substance and Sexual Activity    Alcohol use: Never    Drug use: Never    Sexual activity: Not on file   Other Topics Concern    Not on file   Social History Narrative    Not on file     Social Determinants of Health     Financial Resource Strain: Not on file   Food Insecurity: Not on file   Transportation Needs: Not on file   Physical Activity: Not on file   Stress: Not on file   Social Connections: Not on file   Intimate Partner Violence: Not on file   Housing Stability: Not on file     Current Outpatient Medications   Medication Sig Dispense Refill    anastrozole (ARIMIDEX) 1 MG tablet Take 1 tablet by mouth in the morning. TAKE 1 TABLET DAILY.  90 tablet 1    amLODIPine (NORVASC) 10 MG tablet Take 5 mg by mouth daily      buPROPion (WELLBUTRIN) 100 MG tablet Take 100 mg by mouth 2 times daily      carbidopa-levodopa (SINEMET)  MG per tablet Take 2 tabs TID      carbidopa-levodopa (SINEMET CR)  MG per extended release tablet Take 1 tablet by mouth      Cholecalciferol 50 MCG (2000 UT) CAPS Take by mouth 2 times daily      esomeprazole (NEXIUM) 40 MG delayed release capsule Take by mouth daily      levothyroxine (SYNTHROID) 75 MCG tablet Take by mouth every morning (before breakfast)      losartan (COZAAR) 100 MG tablet Take 100 mg by mouth daily      melatonin 3 MG TABS tablet Take 6 mg by mouth      OLANZapine (ZYPREXA) 10 MG tablet Take 10 mg by mouth oxazepam (SERAX) 15 MG capsule Take 1 capsule by mouth 3 times daily. oxybutynin (DITROPAN-XL) 10 MG extended release tablet Take 10 mg by mouth daily      potassium chloride (KLOR-CON M) 10 MEQ extended release tablet Take 20 mEq by mouth 2 times daily       No current facility-administered medications for this visit. OBJECTIVE:  /63 (Site: Left Upper Arm, Position: Standing, Cuff Size: Large Adult)   Pulse 82   Temp 98 °F (36.7 °C) (Oral)   Resp 16   Wt 182 lb (82.6 kg)   SpO2 96%   BMI 29.83 kg/m²   Physical Exam:      Constitutional:  Oriented to person, place, and time. Well-developed and well-nourished. HEENT:  Normocephalic and atraumatic. Oropharynx is clear and moist.   Conjunctivae and EOM are normal. Pupils are equal, round, and reactive to light. No scleral icterus. Neck supple. No JVD present. No tracheal deviation present. No thyromegaly present. Lymph node  No palpable submandibular, cervical, supraclavicular, axillary and inguinal lymph nodes. Breasts  Right breast s/p lumpectomy, firm area at tumor bed improved. Left breast soft, no mass, non tender     Skin  Warm and dry. No bruising and no rash noted. No erythema. No pallor. Respiratory  Effort normal and breath sounds normal.  No respiratory distress. No wheezes. No rales. No tenderness. CVS  Normal rate, regular rhythm and normal heart sounds. Exam reveals no gallop, no friction and no rub. No murmur heard. Abdomen  Soft. Bowel sounds are normal. Exhibits no distension. There is no tenderness. There is no rebound and no guarding. Neuro  Normal reflexes. No cranial nerve deficit. Exhibits normal muscle tone, 5 of 5 strength of all extremities. MSK  Normal range of motion in general.  No edema and no tenderness.      Psych  Normal mood, affect, behavior, judgment and thought content       Labs:  Recent Results (from the past 24 hour(s))   CBC with Auto Differential Collection Time: 08/11/22  9:15 AM   Result Value Ref Range    WBC 4.8 4.3 - 11.1 K/uL    RBC 4.36 4.05 - 5.2 M/uL    Hemoglobin 11.4 (L) 11.7 - 15.4 g/dL    Hematocrit 37.2 35.8 - 46.3 %    MCV 85.3 79.6 - 97.8 FL    MCH 26.1 26.1 - 32.9 PG    MCHC 30.6 (L) 31.4 - 35.0 g/dL    RDW 17.0 (H) 11.9 - 14.6 %    Platelets 323 557 - 722 K/uL    MPV 10.0 9.4 - 12.3 FL    nRBC 0.00 0.0 - 0.2 K/uL    Seg Neutrophils 64 43 - 78 %    Lymphocytes 27 13 - 44 %    Monocytes 6 4.0 - 12.0 %    Eosinophils % 2 0.5 - 7.8 %    Basophils 1 0.0 - 2.0 %    Immature Granulocytes 0 0.0 - 5.0 %    Segs Absolute 3.1 1.7 - 8.2 K/UL    Absolute Lymph # 1.3 0.5 - 4.6 K/UL    Absolute Mono # 0.3 0.1 - 1.3 K/UL    Absolute Eos # 0.1 0.0 - 0.8 K/UL    Basophils Absolute 0.0 0.0 - 0.2 K/UL    Absolute Immature Granulocyte 0.0 0.0 - 0.5 K/UL    Differential Type AUTOMATED         Imaging:  No results found for this or any previous visit. ASSESSMENT/PLAN:   Diagnosis Orders   1. Malignant neoplasm of right breast in female, estrogen receptor positive, unspecified site of breast ([facility]        2. Aromatase inhibitor use        3. Dizziness          79 y.o. F consulted for new dx of breast cancer in 4/2019. She had mammogram  showed right breast nodule and US biopsy showed IDC % and HER2+ by FISH.  She presented with Fahad Olivas on 4/9/19, and we discussed she appeared having 1.5cm T1c but HER2+ cancer indicated for multidisciplinary therapy, discussed options of local therapy  with mastectomy vs lumpectomy/XRT, discussed XRT for T1 ER+ N0 disease at her age can usu be omitted however refer to rad onc to discuss the impact of HER2+, discussed need for chemotherapy with HER2 directed biological therapy is indicated and discussed  the pros/cons for neoadjuvant vs adjuvant therapy, given the report of T-DM1 being superior to herceptin for pt with remaining disease after neoadjuvant herceptin, the neoadjuvant rx becomes predictive in addition to being prognositic, therefore rec pursue  neoadjuvant [address] given her age, followed by surgery then herceptin vs T-DM1 depending on response and AI, arrange MRI for axillary eval showed no suspicious LN, cT1cN0, and discuss results at tumor board to proceed to neoadjuvant [address], baseline EF 59% on  4/24/19, proceed to cycle 1 [address] 5/2/19, Humira for Crohn's was held and monitor, tolerated well in general except diarrhea for 3 days, continue imodium as needed and add lomotil if needed, c/o taste change and anorexia, added marinol, wean off decadron  for anxiety worse, give IVF, discussed nutrition, US after cycle 3 showed significant LA, EF stable, fell when putting on jeans, refer to onc rehab for strength and balance training, doing better and proceeded to cycle 6 and had lumpectomy/SLND on 9/11/19,  final pathology showed remaining tumor of 1.2 cm and negative LN, discussed and switched to T-DM1 from 10/17/19, adjuvant XRT finished 10/2019, discussed her weight loss and eating/depression, discussed AI and arranged DEXA, hold reclast till right foot  fracture heals, start Arimidex from 12/2019, started reclast, increased anorexia and slowly losing weight, tried various appetite stimulant and would try CBD again, check UA for dysuria, plt 69 and proceed but hold off next cycle and RTC in 6 weeks, EF  stable, call as needed.  She continued to feel weaker, confused, Nathalie Hyatt reported signs of aspiration, x ray showed lung infiltrates and PCP started lasix, Echo/BNP WNL, and her severe anemia appeared major cause of her change and felt much improved after  pRBC, swallow eval normal and COVID negative, stopped T-DM1 after cycle 10, continue arimidex, recovered from anemia, still having dizziness and not much activity, not eating much but weight stable, followed 8/11/2022, repeat mammogram no malignancy and a DEXA showed osteopenia, has been well in general but recently fell after feeling dizzy, fracture, no focal deficit and discussed a low suspicion of cancer recurrence and defer brain MRI unless developing new symptoms, refill Arimidex, continue vitamin D/calcium, Tylenol for arthralgia as needed, return in 6 months but call as needed. EF 59% on 4/24/19   EF 63% on 7/19/19   EF 65-70% on 10/9/19   EF 60-65% on 1/21/20   EF >75% on 3/31/20   EF>55% in 5/2020 Christiano Christian      DEXA 9/2019: osetopenia     All questions are answered to their satisfaction. They will call for further questions and concerns. ECOG PERFORMANCE STATUS - 1- Restricted in physically strenuous activity but ambulatory and able to carry out work of a light or sedentary nature such as light house work, office work. Pain - 7/10. Mild to moderate pain, requiring medication - see MAR     Fatigue - No flowsheet data found. Distress - No flowsheet data found. Elements of this note have been dictated via voice recognition software. Text and phrases may be limited by the accuracy and autoconversion of the software. The chart has been reviewed, but errors may still be present. Gerardo Rico M.D.   [place] [address]  Office : [phone]  Fax : [phone]

## 2022-08-26 ENCOUNTER — APPOINTMENT (OUTPATIENT)
Dept: RADIATION ONCOLOGY | Age: 82
End: 2022-08-26

## 2022-09-09 ENCOUNTER — OFFICE VISIT (OUTPATIENT)
Dept: NEUROLOGY | Age: 82
End: 2022-09-09
Payer: MEDICARE

## 2022-09-09 VITALS
WEIGHT: 182 LBS | BODY MASS INDEX: 29.25 KG/M2 | HEIGHT: 66 IN | SYSTOLIC BLOOD PRESSURE: 135 MMHG | HEART RATE: 89 BPM | DIASTOLIC BLOOD PRESSURE: 72 MMHG

## 2022-09-09 DIAGNOSIS — R25.1 TREMOR: ICD-10-CM

## 2022-09-09 DIAGNOSIS — F40.298 FEAR OF FALLING: ICD-10-CM

## 2022-09-09 DIAGNOSIS — R29.3 POSTURAL IMBALANCE: ICD-10-CM

## 2022-09-09 DIAGNOSIS — G20 PARKINSON DISEASE (HCC): Primary | ICD-10-CM

## 2022-09-09 DIAGNOSIS — Z79.899 ENCOUNTER FOR LONG-TERM (CURRENT) USE OF HIGH-RISK MEDICATION: ICD-10-CM

## 2022-09-09 DIAGNOSIS — R42 DIZZY SPELLS: ICD-10-CM

## 2022-09-09 DIAGNOSIS — R29.6 FALLS FREQUENTLY: ICD-10-CM

## 2022-09-09 PROCEDURE — 1036F TOBACCO NON-USER: CPT | Performed by: PSYCHIATRY & NEUROLOGY

## 2022-09-09 PROCEDURE — 1090F PRES/ABSN URINE INCON ASSESS: CPT | Performed by: PSYCHIATRY & NEUROLOGY

## 2022-09-09 PROCEDURE — 1123F ACP DISCUSS/DSCN MKR DOCD: CPT | Performed by: PSYCHIATRY & NEUROLOGY

## 2022-09-09 PROCEDURE — G8427 DOCREV CUR MEDS BY ELIG CLIN: HCPCS | Performed by: PSYCHIATRY & NEUROLOGY

## 2022-09-09 PROCEDURE — G8417 CALC BMI ABV UP PARAM F/U: HCPCS | Performed by: PSYCHIATRY & NEUROLOGY

## 2022-09-09 PROCEDURE — 99215 OFFICE O/P EST HI 40 MIN: CPT | Performed by: PSYCHIATRY & NEUROLOGY

## 2022-09-09 PROCEDURE — G8399 PT W/DXA RESULTS DOCUMENT: HCPCS | Performed by: PSYCHIATRY & NEUROLOGY

## 2022-09-09 RX ORDER — CARBIDOPA AND LEVODOPA 25; 100 MG/1; MG/1
1 TABLET, EXTENDED RELEASE ORAL NIGHTLY
Qty: 90 TABLET | Refills: 3 | Status: SHIPPED | OUTPATIENT
Start: 2022-09-09

## 2022-09-09 ASSESSMENT — ENCOUNTER SYMPTOMS
EYES NEGATIVE: 1
ALLERGIC/IMMUNOLOGIC NEGATIVE: 1
GASTROINTESTINAL NEGATIVE: 1
RESPIRATORY NEGATIVE: 1

## 2022-09-09 NOTE — PROGRESS NOTES
09/09/22  Cristina Ferguson        Chief Complaint:  Chief Complaint   Patient presents with    Neurologic Problem     6 month follow up Parkinson's       Parkinson's Disease Diagnosed: tremors started about 1.5 years ago (January 2017) in both hands at rest but may have been more pronounced on right first. She has a history of a right basal ganglia lacunar infarct on head CT in 2008 but her left-sided symptoms started prior to the time of that infarct. She was previously on inderal and primidone; tapered off primidone at her first office visit with Dr Teresita Noriega in September of 2020. She also has a history of depression, has had an inpatient stay at [address] [place] [address] and was treated with ECT in 2018. HPI:   Cristina Ferguson, 82 y.o.,female here in clinic follow up for continued management of Parkinson's Disease. She is doing well but she is having dizziness and having falls. She is dizzy with short episodes of dizziness throughout the day. It can happen sitting, standing, laying down. No pattern. She has heart murmur and cardiology told them that it could be from that. She fell last week and may have lost consciousness and then fallen. She fractured her L4 vertebrae with this fall. She is getting her procedure for repair on the 23rd. Last fall she fractured her ankle and has it in a boot. The dizziness began a year ago. She describes the dizziness as spinning and not lightheaded and lasts for only secs. But so intense that she gets thrown off balance and has to use a walker at home because of this issue. She is seeing her oncologist for her follow up (history of breast cancer). Parkinson's disease motor symptoms are well controlled and denies wearing off between doses. Now she is can't tell me how her balance is bc she is scared to fall so she has a constant fear of falling. Now with a boot and Vertebral fracture she is even more scared.        8/12/22 Echo report: Findings    Mitral Valve  The mitral valve leaflets are sclerotic with normal leaflet excursion. There is no mitral valve stenosis. There is mild mitral regurgitation. Aortic Valve  The aortic valve leaflets are sclerotic with normal leaflet excursion. No significant stenosis noted. No aortic regurgitation is present. Tricuspid Valve  The tricuspid valve is normal in structure and function. There is no tricuspid stenosis. There is trace tricuspid regurgitation. Pulmonic Valve  The pulmonic valve is normal in structure and function. There is no pulmonic valvular stenosis. There is no pulmonic valvular regurgitation. Left Atrium  Normal size LA. Left Ventricle  The Left Ventricle is normal in size. There is moderate to severe concentric Left Ventricular Hypertrophy. EF= > 55% . No regional wall motion abnormalities noted. The velocities in the LVOT with and without Valsalva appear to be  decreased from previous echo. I spent 20 mins in review of medical/ hospital records and studies. Current Neuro related meds:  She is on zyprexa 10 mg at HS, unable to wean, severe anxiety. Melatonin 9 mg for sleep, denies RBD  Sinemet 25/100 mg 1.5 tabs at 7am, 5pm and 2 tabs at noon,   Sinemet CR 25/100mg 1 tab Qhs 9pm         Review of Systems:    Review of Systems   Constitutional: Negative. HENT: Negative. Eyes: Negative. Respiratory: Negative. Cardiovascular: Negative. Gastrointestinal: Negative. Endocrine: Negative. Genitourinary: Negative. Musculoskeletal: Negative. Skin: Negative. Allergic/Immunologic: Negative. Neurological: Negative. Hematological: Negative. Psychiatric/Behavioral: Negative. Patient denies:  dizziness or light headedness,  drooling or swallowing issues  constipation,   hallucinations/ visual illusions or impulse control disorder   recent falls.    RBD     RLS    Past Medical History:   Diagnosis Date    Anxiety     Breast cancer ([facility] [address] [address]     Cancer ([facility]     melanoma    Cancer of right breast ([facility] [facility] Dx 04/2019    Crohn's disease ([facility] [address]     Heart failure ([facility] [address] [facility]     History of chemotherapy     Hypertension     Menopause     Psychiatric disorder     anxiety/depression    Radiation therapy complication     Thyroid disease         Past Surgical History:   Procedure Laterality Date    BREAST BIOPSY      BREAST LUMPECTOMY Right     HYSTERECTOMY (CERVIX STATUS UNKNOWN)      IR PORT PLACEMENT EQUAL OR GREATER THAN 5 YEARS  4/18/2019    IR PORT PLACEMENT EQUAL OR GREATER THAN 5 YEARS  04/18/2019    IR PORT PLACEMENT EQUAL OR GREATER THAN 5 YEARS 4/18/2019 [facility] RADIOLOGY SPECIALS    IR REMOVE TUNNELED VAD W PORT  8/7/2020    LUMBAR LAMINECTOMY      VASCULAR SURGERY         Family History   Problem Relation Age of Onset    Breast Cancer Neg Hx     Cancer Sister         melanoma    Cancer Brother         lung       Social History     Socioeconomic History    Marital status:      Spouse name: Not on file    Number of children: Not on file    Years of education: Not on file    Highest education level: Not on file   Occupational History    Not on file   Tobacco Use    Smoking status: Never    Smokeless tobacco: Never   Substance and Sexual Activity    Alcohol use: Never    Drug use: Never    Sexual activity: Not on file   Other Topics Concern    Not on file   Social History Narrative    Not on file     Social Determinants of Health     Financial Resource Strain: Not on file   Food Insecurity: Not on file   Transportation Needs: Not on file   Physical Activity: Not on file   Stress: Not on file   Social Connections: Not on file   Intimate Partner Violence: Not on file   Housing Stability: Not on file       Current Outpatient Medications on File Prior to Visit   Medication Sig Dispense Refill    anastrozole (ARIMIDEX) 1 MG tablet Take 1 tablet by mouth in the morning. TAKE 1 TABLET DAILY.  90 tablet 1    buPROPion (WELLBUTRIN) 100 MG tablet Take 100 mg by mouth 2 times daily      carbidopa-levodopa (SINEMET)  MG per tablet Take 2 tabs TID      carbidopa-levodopa (SINEMET CR)  MG per extended release tablet Take 1 tablet by mouth nightly      Cholecalciferol 50 MCG (2000 UT) CAPS Take by mouth 2 times daily      esomeprazole (NEXIUM) 40 MG delayed release capsule Take by mouth daily      levothyroxine (SYNTHROID) 75 MCG tablet Take by mouth every morning (before breakfast)      losartan (COZAAR) 100 MG tablet Take 100 mg by mouth daily      melatonin 3 MG TABS tablet Take 9 mg by mouth      OLANZapine (ZYPREXA) 10 MG tablet Take 10 mg by mouth      oxazepam (SERAX) 15 MG capsule Take 1 capsule by mouth 3 times daily. oxybutynin (DITROPAN-XL) 10 MG extended release tablet Take 10 mg by mouth daily      potassium chloride (KLOR-CON M) 10 MEQ extended release tablet Take 20 mEq by mouth daily      amLODIPine (NORVASC) 10 MG tablet Take 10 mg by mouth daily       No current facility-administered medications on file prior to visit. Allergies   Allergen Reactions    Mesalamine Other (See Comments)     Fever/chills    Pentosan Polysulfate Sodium Other (See Comments)    Povidone-Iodine Hives    Penicillins Rash           Physical Examination    Vitals:    09/09/22 1347 09/09/22 1351   BP: (!) 147/75 135/72   Site: Left Upper Arm Left Upper Arm   Position: Sitting Standing   Pulse: 81 89   Weight: 182 lb (82.6 kg)    Height: 5' 5.5\" (1.664 m)          General: No acute distress  Psychiatric: well oriented, normal mood and affect  Cardiovascular: no peripheral edema, no JVD, no carotid bruits, RRR  Pulmonary: CTAB  Skin: No rashes, lesions or ulcers  Ext: no C/C/E      Neurologic Exam  Patient oriented to Person, Place and Time. Language and fund of knowledge intact.    CN:  Extraocular movements are intact,facial sensation  Intact and strength is intact, , palate elevates normally, tongue protrudes midline, shoulder shrug is normal.    Motor:RUE 5/5, RLE 5/5, LUE 5/5, LLE 5/5 ,  normal bulk and tone    Reflexes: Patellar reflexes are +2 , Achilles reflexes are 1+ , toes are downgoing. Sensation: Normal  light touch, temperature and vibration throughout     Cerebellar: FTN, HTS intact    Motor Examination: Last dose of sinemet was 2.5 hrs ago. Voice tremor       Chin tremor         RIGHT LEFT   Tremor at rest 0 0   Postural Tremor of hands 0 0   Action Tremor of hands 0 0   Tremor of Lower extremity 0 0   Open/Close 0 0   Rapid Alternating Movements of Hands 0 0   Finger Taps 0 0   Rigidity - Upper extremity 1 2   Rigidity- Lower extremity  1 1   Foot tapping/LE agility 0 In a boot, so can't evaluate. Hypophonia 2   Hypomimia 2   Arising from Chair slow   Posture Slightly ant rotated shoulders   Gait Good stride and slightly decreased stride   Pull test Deferred    Dyskinesia  absent   Body Bradykinsesia 1        Assessment/Plan:   Diagnosis Orders   1. Parkinson disease ([facility]        2. Postural imbalance        3. Tremor        4. Encounter for long-term (current) use of high-risk medication        5. Dizzy spells        6. Falls frequently        7. Fear of falling            Tremor predominant PD that is stable and doing well with motor symptoms. Her dizzy spells that lasts seconds and cause her multiple falls with injuries and hitting her head, are concerning. She has seconds of actual spinning and unclear if at times there is LOC. It does not matter what she is doing when these spells come on and can happen multiple times a day. Even lying. No any worse with head movements. I want to ensure this is not neurological and the only other concern would be possible seizure activity. I will order EEG to evaluate. If EEG is normal then I would ask that we have cardiology evaluate for 2nd opinion and perhaps consider a monitor.      I have spent greater than 50% of the patient's 60 minute visit  in counseling for importance of exercise,  medications and education of disease and disease progression. Patient is to continue all other medications as directed by prescribing physicians unless addressed above in plan. Continuation of these medications from today's visit are made based on the patient's report of current medications.      Coby De La Garza MD  [facility] Neurology  Director Movement Disorders Program  [facility]. [address]  [place], [address]  Phone: [phone]  Fax: [phone]

## 2022-09-20 ENCOUNTER — NURSE ONLY (OUTPATIENT)
Dept: NEUROLOGY | Age: 82
End: 2022-09-20
Payer: MEDICARE

## 2022-09-20 DIAGNOSIS — R41.82 ALTERED MENTAL STATUS, UNSPECIFIED ALTERED MENTAL STATUS TYPE: Primary | ICD-10-CM

## 2022-09-20 DIAGNOSIS — G20 PARKINSON DISEASE (HCC): ICD-10-CM

## 2022-09-20 DIAGNOSIS — R42 DIZZY SPELLS: ICD-10-CM

## 2022-09-20 PROCEDURE — 95819 EEG AWAKE AND ASLEEP: CPT | Performed by: PSYCHIATRY & NEUROLOGY

## 2022-09-20 NOTE — PROGRESS NOTES
[address]                                                   ELECTROENCEPHALOGRAM REPORT                                                           [facility] [facility]                                                          [phone]       DATE[de-identified]     9/20/2022       EEG Number:           Indication:    Altered  mental status/ dizzy spells      Current Outpatient Medications:     carbidopa-levodopa (SINEMET)  MG per tablet, Take 2 tabs TID, Disp: 540 tablet, Rfl: 3    carbidopa-levodopa (SINEMET CR)  MG per extended release tablet, Take 1 tablet by mouth nightly, Disp: 90 tablet, Rfl: 3    anastrozole (ARIMIDEX) 1 MG tablet, Take 1 tablet by mouth in the morning. TAKE 1 TABLET DAILY. , Disp: 90 tablet, Rfl: 1    amLODIPine (NORVASC) 10 MG tablet, Take 10 mg by mouth daily, Disp: , Rfl:     buPROPion (WELLBUTRIN) 100 MG tablet, Take 100 mg by mouth 2 times daily, Disp: , Rfl:     Cholecalciferol 50 MCG (2000 UT) CAPS, Take by mouth 2 times daily, Disp: , Rfl:     esomeprazole (NEXIUM) 40 MG delayed release capsule, Take by mouth daily, Disp: , Rfl:     levothyroxine (SYNTHROID) 75 MCG tablet, Take by mouth every morning (before breakfast), Disp: , Rfl:     losartan (COZAAR) 100 MG tablet, Take 100 mg by mouth daily, Disp: , Rfl:     melatonin 3 MG TABS tablet, Take 9 mg by mouth, Disp: , Rfl:     OLANZapine (ZYPREXA) 10 MG tablet, Take 10 mg by mouth, Disp: , Rfl:     oxazepam (SERAX) 15 MG capsule, Take 1 capsule by mouth 3 times daily. , Disp: , Rfl:     oxybutynin (DITROPAN-XL) 10 MG extended release tablet, Take 10 mg by mouth daily, Disp: , Rfl:     potassium chloride (KLOR-CON M) 10 MEQ extended release tablet, Take 20 mEq by mouth daily, Disp: , Rfl:       Technique: This EEG was performed using the [employer] 10/20 System.   An EKG

## 2022-10-27 NOTE — PROGRESS NOTES
Shawna Half  : 1940  [place]  Secondary: Hernán Zazuetakatharine [address] at [place] [id number] [facility], [address] [place] [address],[address], [address] [facility]  [phone]   [phone]         OUTPATIENT PHYSICAL THERAPY: Daily Treatment Note 2020  Visit Count:  16    ICD-10: Treatment Diagnosis: Other abnormalities of gait and mobility R26.89  Precautions/Allergies:   Betadine [povidone-iodine]; Pcn [penicillins]; and Pentasa [mesalamine]   TREATMENT PLAN:  Effective Dates: 2019 TO 2020 (90 days). Frequency/Duration: 2 times a week for 60- 90 Day(s)    Pre-treatment Symptoms/Complaints: Good weekend. Fatigue level today 6/10. HR 78 bpm, O2 96%  Pain: Initial: Pain Intensity 1: 0 0/10 Post Session:  0/10   Medications Last Reviewed:  2020  Updated Objective Findings:  None Today  TREATMENT:     THERAPEUTIC EXERCISE: (15 minutes):  Exercises per grid below to improve mobility, strength and balance. Required minimal verbal cues to promote proper body alignment, promote proper body posture and promote proper body mechanics. Progressed resistance, repetitions and complexity of movement as indicated. Date:  20   Activity/Exercise Parameters   Step ups 6 inch step x 15 reps each leg    Standing :  hip flexion, hip abduction, hip extension X 15 reps each leg, each exercise   Sit to stand From mat table 2x10 reps   Nustep Level 4 x 5 minutes         NEUROMUSCULAR RE-EDUCATION: (25 minutes):  Exercise/activities per grid below to improve balance, coordination, kinesthetic sense, posture and proprioception. Required minimal verbal cues to promote static and dynamic balance in standing.       Activity    Date  20   Activity/Exercise   Sets/reps/equipment   Walking with head turns        Walking with head up & down        Step overs     Over 1/2 foam roll x 20 reps fwd and laterally with no UE assist   Step taps     6 inch step x 30 reps  Fwd and cross with no UE assist   Walking    In long hallway and up/down ramps, VCs for longer steps and for arm swing,  SBA   Sidestepping      Crossovers      [place]      Walking  backwards        Tandem walking      Weaving in/out of cones        Picking up cones        Sports cord        BitAccess      Kick ball      Figure 8s       Circles right/left      Walking with 360 degree turns      Spirals      Weight shifting:    Left & Right        Weight shifting: Forward & Backward         Static Standing Balance        Standing with feet apart     Blue foams eyes open and closed   Standing with feet together        Standing with feet semitandem   Blue foams eyes open and closed   Standing with feet tandem      Single leg stance      X1/X2 Viewing exercises        Hallpike-Mindy testing for BPPV (Benign Paroxysmal Positional Vertigo)        Cali-Daroff exercises      Canalith Repositioning treatment/Epley Maneuver  for BPPV (Benign Paroxysmal Positional Vertigo)      Smart Equitest Training: See scanned report. [facility] Portal  Treatment/Session Summary:    · Response to Treatment:   Patient tolerated session well. Balance has improved. She still needs frequent rest breaks due to her elevated fatigue level. Encouraging patient to take bigger steps and swing arms during ambulation to normalize gait and improve step clearance. Continue to progress as tolerated. · Communication/Consultation:  None today  · Equipment provided today:  None today  · Recommendations/Intent for next treatment session: Next visit will focus on therapeutic exercise for strengthening and activity tolerance, balance training.     Total Treatment Billable Duration:  40 minutes  PT Patient Time In/Time Out  Time In: 1005  Time Out: Brandon Mackay PT    Future Appointments   Date Time Provider Milla Betancur   1/15/2020 10:15 AM Gabriella Carbajal PT [facility]   1/20/2020 10:15 AM Gabriella Carbajal PT [facility]   1/21/2020  1:30 PM ECHO 52 [facility]   1/22/2020 10:15 AM Toni Parks, PT [facility]   1/27/2020 10:15 AM Toni Parks, PT [facility]   1/29/2020 10:15 AM Toni Parks, PT [facility]   1/31/2020  2:30 PM [facility] [employer] INS[employer] [facility] [address] [facility]   1/31/2020  3:00 PM Ashlee Pert [facility] [address] [facility]   2/21/2020 10:40 AM GCC [employer] INS[employer] GCC[employer]IG [facility] [address] [facility]   2/21/2020 11:15 AM Rolf Sexton MD [facility]   2/21/2020 11:30 AM Ashlee Hartmann [facility]   3/18/2020  2:00 PM Anabel Yi MD [facility]   4/21/2020 11:30 AM Yeimi Mcintyre NP [facility] [address] [facility] Colchicine Counseling:  Patient counseled regarding adverse effects including but not limited to stomach upset (nausea, vomiting, stomach pain, or diarrhea).  Patient instructed to limit alcohol consumption while taking this medication.  Colchicine may reduce blood counts especially with prolonged use.  The patient understands that monitoring of kidney function and blood counts may be required, especially at baseline. The patient verbalized understanding of the proper use and possible adverse effects of colchicine.  All of the patient's questions and concerns were addressed.

## 2023-01-27 DIAGNOSIS — G20 PARKINSON DISEASE (HCC): Primary | ICD-10-CM

## 2023-01-29 RX ORDER — CARBIDOPA AND LEVODOPA 25; 100 MG/1; MG/1
1 TABLET, EXTENDED RELEASE ORAL NIGHTLY
Qty: 90 TABLET | Refills: 3 | Status: SHIPPED | OUTPATIENT
Start: 2023-01-29

## 2023-02-20 DIAGNOSIS — C50.911 MALIGNANT NEOPLASM OF RIGHT BREAST IN FEMALE, ESTROGEN RECEPTOR POSITIVE, UNSPECIFIED SITE OF BREAST (HCC): Primary | ICD-10-CM

## 2023-02-20 DIAGNOSIS — Z17.0 MALIGNANT NEOPLASM OF RIGHT BREAST IN FEMALE, ESTROGEN RECEPTOR POSITIVE, UNSPECIFIED SITE OF BREAST (HCC): Primary | ICD-10-CM

## 2023-02-23 ENCOUNTER — HOSPITAL ENCOUNTER (OUTPATIENT)
Dept: LAB | Age: 83
Discharge: HOME | End: 2023-02-23
Payer: MEDICARE

## 2023-02-23 ENCOUNTER — OFFICE VISIT (OUTPATIENT)
Dept: ONCOLOGY | Age: 83
End: 2023-02-23
Payer: MEDICARE

## 2023-02-23 VITALS
TEMPERATURE: 98.2 F | OXYGEN SATURATION: 95 % | BODY MASS INDEX: 30.84 KG/M2 | WEIGHT: 191.9 LBS | SYSTOLIC BLOOD PRESSURE: 112 MMHG | HEIGHT: 66 IN | DIASTOLIC BLOOD PRESSURE: 76 MMHG | HEART RATE: 68 BPM | RESPIRATION RATE: 16 BRPM

## 2023-02-23 DIAGNOSIS — M85.80 OSTEOPENIA, UNSPECIFIED LOCATION: ICD-10-CM

## 2023-02-23 DIAGNOSIS — C50.911 MALIGNANT NEOPLASM OF RIGHT BREAST IN FEMALE, ESTROGEN RECEPTOR POSITIVE, UNSPECIFIED SITE OF BREAST (HCC): Primary | ICD-10-CM

## 2023-02-23 DIAGNOSIS — Z17.0 MALIGNANT NEOPLASM OF RIGHT BREAST IN FEMALE, ESTROGEN RECEPTOR POSITIVE, UNSPECIFIED SITE OF BREAST (HCC): ICD-10-CM

## 2023-02-23 DIAGNOSIS — Z79.811 AROMATASE INHIBITOR USE: ICD-10-CM

## 2023-02-23 DIAGNOSIS — C50.911 MALIGNANT NEOPLASM OF RIGHT FEMALE BREAST, UNSPECIFIED ESTROGEN RECEPTOR STATUS, UNSPECIFIED SITE OF BREAST (HCC): ICD-10-CM

## 2023-02-23 DIAGNOSIS — Z17.0 ESTROGEN RECEPTOR POSITIVE STATUS (ER+): ICD-10-CM

## 2023-02-23 DIAGNOSIS — Z12.31 ENCOUNTER FOR SCREENING MAMMOGRAM FOR MALIGNANT NEOPLASM OF BREAST: ICD-10-CM

## 2023-02-23 DIAGNOSIS — Z17.0 MALIGNANT NEOPLASM OF RIGHT BREAST IN FEMALE, ESTROGEN RECEPTOR POSITIVE, UNSPECIFIED SITE OF BREAST (HCC): Primary | ICD-10-CM

## 2023-02-23 DIAGNOSIS — C50.911 MALIGNANT NEOPLASM OF RIGHT BREAST IN FEMALE, ESTROGEN RECEPTOR POSITIVE, UNSPECIFIED SITE OF BREAST (HCC): ICD-10-CM

## 2023-02-23 LAB
ALBUMIN SERPL-MCNC: 3.4 G/DL (ref 3.2–4.6)
ALBUMIN/GLOB SERPL: 0.9 (ref 0.4–1.6)
ALP SERPL-CCNC: 137 U/L (ref 50–136)
ALT SERPL-CCNC: 8 U/L (ref 12–65)
ANION GAP SERPL CALC-SCNC: 3 MMOL/L (ref 2–11)
AST SERPL-CCNC: 12 U/L (ref 15–37)
BASOPHILS # BLD: 0 K/UL (ref 0–0.2)
BASOPHILS NFR BLD: 1 % (ref 0–2)
BILIRUB SERPL-MCNC: 0.3 MG/DL (ref 0.2–1.1)
BUN SERPL-MCNC: 15 MG/DL (ref 8–23)
CALCIUM SERPL-MCNC: 9.3 MG/DL (ref 8.3–10.4)
CHLORIDE SERPL-SCNC: 112 MMOL/L (ref 101–110)
CO2 SERPL-SCNC: 26 MMOL/L (ref 21–32)
CREAT SERPL-MCNC: 1 MG/DL (ref 0.6–1)
DIFFERENTIAL METHOD BLD: ABNORMAL
EOSINOPHIL # BLD: 0.1 K/UL (ref 0–0.8)
EOSINOPHIL NFR BLD: 2 % (ref 0.5–7.8)
ERYTHROCYTE [DISTWIDTH] IN BLOOD BY AUTOMATED COUNT: 16.6 % (ref 11.9–14.6)
GLOBULIN SER CALC-MCNC: 4 G/DL (ref 2.8–4.5)
GLUCOSE SERPL-MCNC: 150 MG/DL (ref 65–100)
HCT VFR BLD AUTO: 37.2 % (ref 35.8–46.3)
HGB BLD-MCNC: 11.1 G/DL (ref 11.7–15.4)
IMM GRANULOCYTES # BLD AUTO: 0 K/UL (ref 0–0.5)
IMM GRANULOCYTES NFR BLD AUTO: 0 % (ref 0–5)
LYMPHOCYTES # BLD: 1.1 K/UL (ref 0.5–4.6)
LYMPHOCYTES NFR BLD: 26 % (ref 13–44)
MCH RBC QN AUTO: 25.1 PG (ref 26.1–32.9)
MCHC RBC AUTO-ENTMCNC: 29.8 G/DL (ref 31.4–35)
MCV RBC AUTO: 84 FL (ref 82–102)
MONOCYTES # BLD: 0.3 K/UL (ref 0.1–1.3)
MONOCYTES NFR BLD: 7 % (ref 4–12)
NEUTS SEG # BLD: 2.8 K/UL (ref 1.7–8.2)
NEUTS SEG NFR BLD: 64 % (ref 43–78)
NRBC # BLD: 0 K/UL (ref 0–0.2)
PLATELET # BLD AUTO: 164 K/UL (ref 150–450)
PMV BLD AUTO: 10 FL (ref 9.4–12.3)
POTASSIUM SERPL-SCNC: 4.4 MMOL/L (ref 3.5–5.1)
PROT SERPL-MCNC: 7.4 G/DL (ref 6.3–8.2)
RBC # BLD AUTO: 4.43 M/UL (ref 4.05–5.2)
SODIUM SERPL-SCNC: 141 MMOL/L (ref 133–143)
WBC # BLD AUTO: 4.3 K/UL (ref 4.3–11.1)

## 2023-02-23 PROCEDURE — 80053 COMPREHEN METABOLIC PANEL: CPT

## 2023-02-23 PROCEDURE — 99214 OFFICE O/P EST MOD 30 MIN: CPT | Performed by: INTERNAL MEDICINE

## 2023-02-23 PROCEDURE — 1090F PRES/ABSN URINE INCON ASSESS: CPT | Performed by: INTERNAL MEDICINE

## 2023-02-23 PROCEDURE — 85025 COMPLETE CBC W/AUTO DIFF WBC: CPT

## 2023-02-23 PROCEDURE — 1123F ACP DISCUSS/DSCN MKR DOCD: CPT | Performed by: INTERNAL MEDICINE

## 2023-02-23 PROCEDURE — G8484 FLU IMMUNIZE NO ADMIN: HCPCS | Performed by: INTERNAL MEDICINE

## 2023-02-23 PROCEDURE — G8399 PT W/DXA RESULTS DOCUMENT: HCPCS | Performed by: INTERNAL MEDICINE

## 2023-02-23 PROCEDURE — G8427 DOCREV CUR MEDS BY ELIG CLIN: HCPCS | Performed by: INTERNAL MEDICINE

## 2023-02-23 PROCEDURE — 1036F TOBACCO NON-USER: CPT | Performed by: INTERNAL MEDICINE

## 2023-02-23 PROCEDURE — 36415 COLL VENOUS BLD VENIPUNCTURE: CPT

## 2023-02-23 PROCEDURE — G8417 CALC BMI ABV UP PARAM F/U: HCPCS | Performed by: INTERNAL MEDICINE

## 2023-02-23 RX ORDER — ANASTROZOLE 1 MG/1
1 TABLET ORAL DAILY
Qty: 90 TABLET | Refills: 1 | Status: SHIPPED | OUTPATIENT
Start: 2023-02-23

## 2023-02-23 RX ORDER — BETAMETHASONE DIPROPIONATE 0.5 MG/G
CREAM TOPICAL
COMMUNITY
Start: 2023-01-31

## 2023-02-23 ASSESSMENT — PATIENT HEALTH QUESTIONNAIRE - PHQ9
SUM OF ALL RESPONSES TO PHQ QUESTIONS 1-9: 1
2. FEELING DOWN, DEPRESSED OR HOPELESS: 1
SUM OF ALL RESPONSES TO PHQ QUESTIONS 1-9: 1

## 2023-02-23 NOTE — PROGRESS NOTES
[facility]: Office Visit Progress Note      Chief Complaint:     Breast cancer ER+ HER2+      History of Present Illness:   Reason for Referral:  Breast Ca       Referring Provider:  Dr. Ramesh Wang       Primary Care Provider:  None documented       Family History of Cancer/Hematologic disorders: Personal Hx: Melanoma; Sister, Melanoma; Brother, Lung Ca       Presenting Symptoms:  Abnormal Imaging of R Breast       Ms. Rekha Parisi is a 79 y.o. female. She presented to Dr. Kandace Newman on 3/22/19 for consultation regarding abnormal imaging of routine screening  mammogram.  She reported no abnormalities on SBE. She has had one breast biopsy years ago what was benign. Her ROS was normal.          3/18/19 MAMMOGRAM/US R BREAST   MAMMOGRAM: ML and spot compression views confirm the presence of a spiculated, approximately 1.5 cm mass in the upper inner quadrant of the right breast.  No significant calcifications are present. ULTRASOUND: There is an irregular, hypoechoic, approximately 1.5 cm longest dimension mass at the 2 o'clock position of the breast, 3 cm from nipple, corresponding to the mammographic findings and height is suggestive of malignancy. IMPRESSION:  BI-RADS 5. Highly suspicious of malignancy. Ultrasound guided biopsy should be performed. 3/29/19 PATHOLOGY DIAGNOSIS   RIGHT BREAST MASS, MAMMOTOME BIOPSY:   -Invasive ductal carcinoma   No lymphovascular invasion is identified   ER: Positive 100%   OH: Positive 10%   HER-2: Equivocal (2+)       Notes from referring Provider:  n/a       Other pertinent information:  Non-smoker. Her Med Hx includes anemia, anxiety, Crohns, GERD, depression, heart murmur, melanoma, HTN,  osteoarthritis, sinusitis, insomnia, Hypothyroid, hyperlipidemia, et al.  She receives Humira injections weekly. Interim history update in A/P. Review of Systems:      Constitutional  Denies fever or chills.   Denies weight loss or appetite changes. Denies fatigue. Denies anorexia. HEENT  Denies trauma, bluring vision, hearing loss, ear pain, nosebleeds, sore throat, neck pain and ear discharge. Skin  Denies lesions or rashes. Lungs  Denies shortness of breath, cough, sputum production or hemoptysis. Cardiovascular  Denies chest pain, palpitations, orthopnea, claudication and leg swelling. Gastrointestinal  Denies nausea, vomiting. Denies bloody or black stools. Abdominal pain, constipation, crohns       Denies dysuria, frequency or hesitancy of urination     Neuro  Denies headaches, visual changes or ataxia. Denies dizziness, tingling, tremors, sensory change, speech change, focal weakness and headaches. involumtary tremors         Hematology  Denies nasal/gum bleeding, denies easy bruise   Hx of anemia     Endo  Denies heat/cold intolerance, denies diabetes. thyroid     MSK Fall and fracture. Denies back pain, swollen legs, myalgias. Psychiatric/Behavioral  Denies substance abuse. Depression, anxiety                Allergies   Allergen Reactions    Mesalamine Other (See Comments)     Pt not sure Fever/chills    Pentosan Polysulfate Sodium Other (See Comments)     Other reaction(s):  Other (See Comments), Unknown (comments)    Povidone-Iodine Hives    Penicillins Rash     Past Medical History:   Diagnosis Date    Anxiety     Breast cancer ([address]     Cancer ([address]     melanoma    Cancer of right breast ([address] Dx 04/2019    Crohn's disease ([address]     Heart failure ([address]     History of chemotherapy     Hypertension     Menopause     Psychiatric disorder     anxiety/depression    Radiation therapy complication     Thyroid disease      Past Surgical History:   Procedure Laterality Date    BREAST BIOPSY      BREAST LUMPECTOMY Right     HYSTERECTOMY (CERVIX STATUS UNKNOWN)      IR PORT PLACEMENT EQUAL OR GREATER THAN 5 YEARS  4/18/2019    IR PORT PLACEMENT EQUAL OR GREATER THAN 5 YEARS  04/18/2019 IR PORT PLACEMENT EQUAL OR GREATER THAN 5 YEARS 4/18/2019 [facility] RADIOLOGY SPECIALS    IR REMOVE TUNNELED VAD W PORT  8/7/2020    LUMBAR LAMINECTOMY      VASCULAR SURGERY       Family History   Problem Relation Age of Onset    Breast Cancer Neg Hx     Cancer Sister         melanoma    Cancer Brother         lung     Social History     Socioeconomic History    Marital status:      Spouse name: Not on file    Number of children: Not on file    Years of education: Not on file    Highest education level: Not on file   Occupational History    Not on file   Tobacco Use    Smoking status: Never    Smokeless tobacco: Never   Substance and Sexual Activity    Alcohol use: Never    Drug use: Never    Sexual activity: Not on file   Other Topics Concern    Not on file   Social History Narrative    Not on file     Social Determinants of Health     Financial Resource Strain: Not on file   Food Insecurity: Not on file   Transportation Needs: Not on file   Physical Activity: Not on file   Stress: Not on file   Social Connections: Not on file   Intimate Partner Violence: Not on file   Housing Stability: Not on file     Current Outpatient Medications   Medication Sig Dispense Refill    betamethasone dipropionate 0.05 % cream       carbidopa-levodopa (SINEMET CR)  MG per extended release tablet Take 1 tablet by mouth nightly 90 tablet 3    carbidopa-levodopa (SINEMET)  MG per tablet Take 2 tabs  tablet 3    anastrozole (ARIMIDEX) 1 MG tablet Take 1 tablet by mouth in the morning. TAKE 1 TABLET DAILY.  90 tablet 1    amLODIPine (NORVASC) 10 MG tablet Take 10 mg by mouth daily      buPROPion (WELLBUTRIN) 100 MG tablet Take 100 mg by mouth 2 times daily      Cholecalciferol 50 MCG (2000 UT) CAPS Take by mouth 2 times daily      esomeprazole (NEXIUM) 40 MG delayed release capsule Take by mouth daily      levothyroxine (SYNTHROID) 75 MCG tablet Take by mouth every morning (before breakfast)      losartan (COZAAR) 100 MG tablet Take 100 mg by mouth daily      melatonin 3 MG TABS tablet Take 9 mg by mouth      OLANZapine (ZYPREXA) 10 MG tablet Take 10 mg by mouth      oxazepam (SERAX) 15 MG capsule Take 1 capsule by mouth 3 times daily.      oxybutynin (DITROPAN-XL) 10 MG extended release tablet Take 10 mg by mouth daily      potassium chloride (KLOR-CON M) 10 MEQ extended release tablet Take 20 mEq by mouth daily       No current facility-administered medications for this visit.       OBJECTIVE:  /76 (Site: Right Upper Arm, Position: Standing)   Pulse 68   Temp 98.2 °F (36.8 °C)   Resp 16   Ht 5' 5.5\" (1.664 m)   Wt 191 lb 14.4 oz (87 kg)   SpO2 95%   BMI 31.45 kg/m²   Physical Exam:      Constitutional:  Oriented to person, place, and time.   Well-developed and well-nourished.        HEENT:  Normocephalic and atraumatic. Oropharynx is clear and moist.   Conjunctivae and EOM are normal. Pupils are equal, round, and reactive to light. No scleral icterus. Neck supple.  No JVD present.  No tracheal deviation present. No thyromegaly present.     Lymph node  No palpable submandibular, cervical, supraclavicular, axillary and inguinal lymph nodes.     Breasts  Right breast s/p lumpectomy, firm area at tumor bed improved.   Left breast soft, no mass, non tender     Skin  Warm and dry.  No bruising and no rash noted.  No erythema.  No pallor.     Respiratory  Effort normal and breath sounds normal.  No respiratory distress.  No wheezes.  No rales.  No tenderness.     CVS  Normal rate, regular rhythm and normal heart sounds.  Exam reveals no gallop, no friction and no rub.  No murmur heard.     Abdomen  Soft. Bowel sounds are normal. Exhibits no distension. There is no tenderness. There is no rebound and no guarding.     Neuro  Normal reflexes.  No cranial nerve deficit.  Exhibits normal muscle tone, 5 of 5 strength of all extremities.     MSK  Normal range of motion in general.  No edema and no tenderness.     Psych  Normal  mood, affect, behavior, judgment and thought content       Labs:  Recent Results (from the past 24 hour(s))   CBC with Auto Differential    Collection Time: 02/23/23 10:23 AM   Result Value Ref Range    WBC 4.3 4.3 - 11.1 K/uL    RBC 4.43 4.05 - 5.2 M/uL    Hemoglobin 11.1 (L) 11.7 - 15.4 g/dL    Hematocrit 37.2 35.8 - 46.3 %    MCV 84.0 82.0 - 102.0 FL    MCH 25.1 (L) 26.1 - 32.9 PG    MCHC 29.8 (L) 31.4 - 35.0 g/dL    RDW 16.6 (H) 11.9 - 14.6 %    Platelets 065 618 - 654 K/uL    MPV 10.0 9.4 - 12.3 FL    nRBC 0.00 0.0 - 0.2 K/uL    Seg Neutrophils 64 43 - 78 %    Lymphocytes 26 13 - 44 %    Monocytes 7 4.0 - 12.0 %    Eosinophils % 2 0.5 - 7.8 %    Basophils 1 0.0 - 2.0 %    Immature Granulocytes 0 0.0 - 5.0 %    Segs Absolute 2.8 1.7 - 8.2 K/UL    Absolute Lymph # 1.1 0.5 - 4.6 K/UL    Absolute Mono # 0.3 0.1 - 1.3 K/UL    Absolute Eos # 0.1 0.0 - 0.8 K/UL    Basophils Absolute 0.0 0.0 - 0.2 K/UL    Absolute Immature Granulocyte 0.0 0.0 - 0.5 K/UL    Differential Type AUTOMATED     Comprehensive Metabolic Panel    Collection Time: 02/23/23 10:23 AM   Result Value Ref Range    Sodium 141 133 - 143 mmol/L    Potassium 4.4 3.5 - 5.1 mmol/L    Chloride 112 (H) 101 - 110 mmol/L    CO2 26 21 - 32 mmol/L    Anion Gap 3 2 - 11 mmol/L    Glucose 150 (H) 65 - 100 mg/dL    BUN 15 8 - 23 MG/DL    Creatinine 1.00 0.6 - 1.0 MG/DL    Est, Glom Filt Rate 56 (L) >60 ml/min/1.73m2    Calcium 9.3 8.3 - 10.4 MG/DL    Total Bilirubin 0.3 0.2 - 1.1 MG/DL    ALT 8 (L) 12 - 65 U/L    AST 12 (L) 15 - 37 U/L    Alk Phosphatase 137 (H) 50 - 136 U/L    Total Protein 7.4 6.3 - 8.2 g/dL    Albumin 3.4 3.2 - 4.6 g/dL    Globulin 4.0 2.8 - 4.5 g/dL    Albumin/Globulin Ratio 0.9 0.4 - 1.6         Imaging:  No results found for this or any previous visit. ASSESSMENT/PLAN:   Diagnosis Orders   1.  Malignant neoplasm of right breast in female, estrogen receptor positive, unspecified site of breast ([facility]  SALTY DIGITAL SCREEN W OR WO CAD BILATERAL 2. Malignant neoplasm of right female breast, unspecified estrogen receptor status, unspecified site of breast ([facility]        3. Estrogen receptor positive status (ER+)  SALTY DIGITAL SCREEN W OR WO CAD BILATERAL      4. Encounter for screening mammogram for malignant neoplasm of breast   SALTY DIGITAL SCREEN W OR WO CAD BILATERAL      5. Aromatase inhibitor use        6. Osteopenia, unspecified location            79 y.o. F consulted for new dx of breast cancer in 4/2019. She had mammogram  showed right breast nodule and US biopsy showed IDC % and HER2+ by FISH.  She presented with Raudel Marks on 4/9/19, and we discussed she appeared having 1.5cm T1c but HER2+ cancer indicated for multidisciplinary therapy, discussed options of local therapy  with mastectomy vs lumpectomy/XRT, discussed XRT for T1 ER+ N0 disease at her age can usu be omitted however refer to rad onc to discuss the impact of HER2+, discussed need for chemotherapy with HER2 directed biological therapy is indicated and discussed  the pros/cons for neoadjuvant vs adjuvant therapy, given the report of T-DM1 being superior to herceptin for pt with remaining disease after neoadjuvant herceptin, the neoadjuvant rx becomes predictive in addition to being prognositic, therefore rec pursue  neoadjuvant [facility] given her age, followed by surgery then herceptin vs T-DM1 depending on response and AI, arrange MRI for axillary eval showed no suspicious LN, cT1cN0, and discuss results at tumor board to proceed to neoadjuvant [facility], baseline EF 59% on  4/24/19, proceed to cycle 1 [facility] 5/2/19, Humira for Crohn's was held and monitor, tolerated well in general except diarrhea for 3 days, continue imodium as needed and add lomotil if needed, c/o taste change and anorexia, added marinol, wean off decadron  for anxiety worse, give IVF, discussed nutrition, US after cycle 3 showed significant TX, EF stable, fell when putting on jeans, refer to onc rehab for strength and balance training, doing better and proceeded to cycle 6 and had lumpectomy/SLND on 9/11/19,  final pathology showed remaining tumor of 1.2 cm and negative LN, discussed and switched to T-DM1 from 10/17/19, adjuvant XRT finished 10/2019, discussed her weight loss and eating/depression, discussed AI and arranged DEXA, hold reclast till right foot  fracture heals, start Arimidex from 12/2019, started reclast, increased anorexia and slowly losing weight, tried various appetite stimulant and would try CBD again, check UA for dysuria, plt 69 and proceed but hold off next cycle and RTC in 6 weeks, EF  stable, call as needed. She continued to feel weaker, confused, [facility] reported signs of aspiration, x ray showed lung infiltrates and PCP started lasix, Echo/BNP WNL, and her severe anemia appeared major cause of her change and felt much improved after  pRBC, swallow eval normal and COVID negative, stopped T-DM1 after cycle 10, continue arimidex, recovered from anemia, still having dizziness and not much activity, not eating much but weight stable, followed 8/11/2022, repeat mammogram no malignancy and a DEXA showed osteopenia, has been well in general but recently fell after feeling dizzy, fracture, no focal deficit and discussed a low suspicion of cancer recurrence and defer brain MRI unless developing new symptoms, returned on 2/23/2023, generally well, gaining weight and encouraged for diet and exercise, refill Arimidex, continue vitamin D/calcium, Tylenol for arthralgia as needed, return in 6 months with repeat mammogram but call as needed. EF 59% on 4/24/19   EF 63% on 7/19/19   EF 65-70% on 10/9/19   EF 60-65% on 1/21/20   EF >75% on 3/31/20   EF>55% in 5/2020 Bryan Neil)      DEXA 9/2019: osetopenia     All questions are answered to their satisfaction. They will call for further questions and concerns.         ECOG PERFORMANCE STATUS - 1- Restricted in physically strenuous activity but ambulatory and able to carry out work of a light or sedentary nature such as light house work, office work. Pain - 0 - No pain/10. Mild to moderate pain, requiring medication - see MAR     Fatigue - No flowsheet data found. Distress - No flowsheet data found. Elements of this note have been dictated via voice recognition software. Text and phrases may be limited by the accuracy and autoconversion of the software. The chart has been reviewed, but errors may still be present. Katiuska Mckeon M.D.   [address]  Office : [phone]  Fax : [phone]

## 2023-02-23 NOTE — PATIENT INSTRUCTIONS
Patient Instructions from Today's Visit    Reason for Visit:  Follow up breast    Diagnosis Information:  https://www.[employer]/. net/about-us/asco-answers-patient-education-materials/rstn-bcljjob-hqrq-sheets      Plan:  -Light exercise is recommended  -Mammogram in 6 months  (Last mammogram august 11)    Follow Up:  6 months    Recent Lab Results:  Hospital Outpatient Visit on 02/23/2023   Component Date Value Ref Range Status    WBC 02/23/2023 4.3  4.3 - 11.1 K/uL Final    RBC 02/23/2023 4.43  4.05 - 5.2 M/uL Final    Hemoglobin 02/23/2023 11.1 (A)  11.7 - 15.4 g/dL Final    Hematocrit 02/23/2023 37.2  35.8 - 46.3 % Final    MCV 02/23/2023 84.0  82.0 - 102.0 FL Final    MCH 02/23/2023 25.1 (A)  26.1 - 32.9 PG Final    MCHC 02/23/2023 29.8 (A)  31.4 - 35.0 g/dL Final    RDW 02/23/2023 16.6 (A)  11.9 - 14.6 % Final    Platelets 55/38/5281 164  150 - 450 K/uL Final    MPV 02/23/2023 10.0  9.4 - 12.3 FL Final    nRBC 02/23/2023 0.00  0.0 - 0.2 K/uL Final    **Note: Absolute NRBC parameter is now reported with Hemogram**    Seg Neutrophils 02/23/2023 64  43 - 78 % Final    Lymphocytes 02/23/2023 26  13 - 44 % Final    Monocytes 02/23/2023 7  4.0 - 12.0 % Final    Eosinophils % 02/23/2023 2  0.5 - 7.8 % Final    Basophils 02/23/2023 1  0.0 - 2.0 % Final    Immature Granulocytes 02/23/2023 0  0.0 - 5.0 % Final    Segs Absolute 02/23/2023 2.8  1.7 - 8.2 K/UL Final    Absolute Lymph # 02/23/2023 1.1  0.5 - 4.6 K/UL Final    Absolute Mono # 02/23/2023 0.3  0.1 - 1.3 K/UL Final    Absolute Eos # 02/23/2023 0.1  0.0 - 0.8 K/UL Final    Basophils Absolute 02/23/2023 0.0  0.0 - 0.2 K/UL Final    Absolute Immature Granulocyte 02/23/2023 0.0  0.0 - 0.5 K/UL Final    Differential Type 02/23/2023 AUTOMATED    Final    Sodium 02/23/2023 141  133 - 143 mmol/L Final    Potassium 02/23/2023 4.4  3.5 - 5.1 mmol/L Final    Chloride 02/23/2023 112 (A)  101 - 110 mmol/L Final    CO2 02/23/2023 26  21 - 32 mmol/L Final    Anion Gap 02/23/2023 3  2 - 11 mmol/L Final    Glucose 02/23/2023 150 (A)  65 - 100 mg/dL Final    BUN 02/23/2023 15  8 - 23 MG/DL Final    Creatinine 02/23/2023 1.00  0.6 - 1.0 MG/DL Final    Est, Glom Filt Rate 02/23/2023 56 (A)  >60 ml/min/1.73m2 Final    Comment:      Pediatric calculator link: Mac.at. org/professionals/kdoqi/gfr_calculatorped       These results are not intended for use in patients <25years of age. eGFR results are calculated without a race factor using  the 2021 CKD-EPI equation. Careful clinical correlation is recommended, particularly when comparing to results calculated using previous equations. The CKD-EPI equation is less accurate in patients with extremes of muscle mass, extra-renal metabolism of creatinine, excessive creatine ingestion, or following therapy that affects renal tubular secretion. Calcium 02/23/2023 9.3  8.3 - 10.4 MG/DL Final    Total Bilirubin 02/23/2023 0.3  0.2 - 1.1 MG/DL Final    ALT 02/23/2023 8 (A)  12 - 65 U/L Final    AST 02/23/2023 12 (A)  15 - 37 U/L Final    Alk Phosphatase 02/23/2023 137 (A)  50 - 136 U/L Final    Total Protein 02/23/2023 7.4  6.3 - 8.2 g/dL Final    Albumin 02/23/2023 3.4  3.2 - 4.6 g/dL Final    Globulin 02/23/2023 4.0  2.8 - 4.5 g/dL Final    Albumin/Globulin Ratio 02/23/2023 0.9  0.4 - 1.6   Final         Treatment Summary has been discussed and given to patient: n/a        -------------------------------------------------------------------------------------------------------------------  Please call our office at [phone] if you have any  of the following symptoms:   Fever of 100.5 or greater  Chills  Shortness of breath  Swelling or pain in one leg    After office hours an answering service is available and will contact a provider for emergencies or if you are experiencing any of the above symptoms. Patient does express an interest in My Chart.   My Chart log in information explained on the after visit summary printout at the check-out desk.     Candance Boots RN

## 2023-05-15 DIAGNOSIS — G20 PARKINSON DISEASE (HCC): ICD-10-CM

## 2023-06-08 RX ORDER — ANASTROZOLE 1 MG/1
1 TABLET ORAL DAILY
Qty: 30 TABLET | Refills: 0 | Status: SHIPPED | OUTPATIENT
Start: 2023-06-08

## 2023-06-08 RX ORDER — ANASTROZOLE 1 MG/1
1 TABLET ORAL DAILY
Qty: 90 TABLET | Refills: 3 | Status: SHIPPED | OUTPATIENT
Start: 2023-06-08

## 2023-06-12 NOTE — PROGRESS NOTES
Encounter Date: 6/12/2023       History     Chief Complaint   Patient presents with    Fall     Pt was walking in her home around 12 and had a fall onto her left side. Pt denies LOC. Pt did hit the left side of her head. Skin intact +hematoma. Pt also has a bruise to the left wrist. Pt also reports soreness all over. Pt does take blood thinners. Pt needed assistance from her  to get up.      The patient is an 79-year-old female who came to the emergency department after falling.  She was walking with her walker and tripped and fell to the left.  She struck her head on the ground.  She denies loss of consciousness nausea vomiting blurred vision or change in mental status.  She states she has multiple contusions to her left arm and left back but is able to bear weight on her legs.    Review of patient's allergies indicates:   Allergen Reactions    Celebrex [celecoxib] Shortness Of Breath    Ciprofloxacin Swelling     lip    Dicyclomine Hives    Adhesive Dermatitis    Avelox [moxifloxacin] Swelling    Cilostazol Other (See Comments)     Elevates blood pressure    Eryc [erythromycin] Other (See Comments)     unknown    Iodine and iodide containing products Hives    Keflex [cephalexin] Hives    Meclomen      rashes    Meloxicam      Ear ringing    Metoclopramide Other (See Comments)     High blood pressure    Sulfa (sulfonamide antibiotics) Itching     Past Medical History:   Diagnosis Date    Acute on chronic diastolic (congestive) heart failure 11/20/2019    Anticoagulant long-term use     on Plavix since May 2015    Anxiety     Arthritis     Asthma     Atrial fibrillation     Atrial fibrillation with RVR 10/19/2020    Cataracts, bilateral     Chest pain, atypical     Chronic atrial fibrillation with rapid ventricular response 6/23/2017    Colon polyp     Coronary artery disease     Diabetes mellitus     Dry eyes     Esophageal erosions     GERD (gastroesophageal reflux disease)     Heart murmur     Hemorrhoid   Isabel Silvia  : 1940  [place]  Secondary: Hernán Melendrez [address] at [place] [id number] [facility], [address] [place] [address],[address], [facility] [address].  [phone]   [phone]        OUTPATIENT PHYSICAL THERAPY: Daily Treatment Note 2021  Visit Count:  8    ICD-10: Treatment Diagnosis:    Other abnormalities of gait and mobility (R26.89)   Difficulty in walking, not elsewhere classified (R26.2)   Precautions/Allergies:   Betadine [povidone-iodine], Pcn [penicillins], and Pentasa [mesalamine]   TREATMENT PLAN:  Effective Dates: 3/4/2021 TO 2021 (90 days). Frequency/Duration: 2 times a week for 90 Day(s)    Pre-treatment Symptoms/Complaints:  2021: Patient reports she is about the same. She report she still has dizzy spells. Pain: Initial: Pain Intensity 1: 0  Post Session:  0/10   Medications Last Reviewed:  2021  Updated Objective Findings:  None Today  TREATMENT:     THERAPEUTIC EXERCISE: (15 minutes):  Exercises per grid below to improve mobility, strength, balance and coordination. Required minimal verbal and manual cues to promote proper body alignment, promote proper body posture and promote proper body mechanics. Progressed resistance, range, repetitions and complexity of movement as indicated. Date:  2021   Activity/Exercise Parameters   Nustep Level 3   8 minutes   Step ups 6 inch step x 10 reps each leg with rail   Sit to stand 2X 10 reps from mat table with no UE assist     Standing B LE: hip flexion, hip abduction, hip extension, knee flexion, ankle dorsiflexion & plantarflexion X 10 reps each leg, each exercise       Neuromuscular Re-education (  25 minutes):  Exercise/activities per grid below to improve balance, coordination, kinesthetic sense, posture and proprioception. Required minimal verbal cues to promote static and dynamic balance in standing.   Balance/Vestibular Treatment:   Activity    High cholesterol     Hypertension     Irritable bowel syndrome     Paroxysmal atrial fibrillation 10/30/2020    Persistent atrial fibrillation 2/10/2020    Shingles 2015    Stenosis of aortic and mitral valves     Stroke     TIA (transient ischemic attack)     Use of cane as ambulatory aid      Past Surgical History:   Procedure Laterality Date    ABDOMINAL SURGERY      stents to SMA    angiocele      2007, 2014    AORTIC VALVE REPLACEMENT N/A 11/19/2019    Procedure: Replacement-valve-aortic;  Surgeon: Jack Capone MD;  Location: [facility] CATH LAB;  Service: Cardiology;  Laterality: N/A;    BREAST SURGERY      left--- a lump--- no cancer    CARDIAC VALVE SURGERY      COLONOSCOPY  2014    COLONOSCOPY N/A 3/14/2018    Procedure: COLONOSCOPY;  Surgeon: Efren Kemp MD;  Location: [facility] ENDO (4TH FLR);  Service: Endoscopy;  Laterality: N/A;  ok to hold Plavix 5 days prior to procedure per Dr RESHMA Hernandez     ok per Dr Kemp to hold Eliquis 2 days prior to procedure (see telephone encounter dated-2/7/18)    HERNIA REPAIR      HYSTERECTOMY  partial    1982 partial hysterectomy    LEFT HEART CATHETERIZATION Right 11/5/2019    Procedure: Left heart cath;  Surgeon: Jack Capone MD;  Location: [facility] CATH LAB;  Service: Cardiology;  Laterality: Right;    left nasal polyp      left neck lymph node      nose polyp      right hip fatty mass tissue      stent to small intestine      SUPERIOR VENA CAVA ANGIOPLASTY / STENTING      TONSILLECTOMY      TOTAL ABDOMINAL HYSTERECTOMY      2014    TOTAL KNEE ARTHROPLASTY Bilateral     TREATMENT OF CARDIAC ARRHYTHMIA N/A 2/10/2020    Procedure: CARDIOVERSION;  Surgeon: Jayy Parrish MD;  Location: [facility] EP LAB;  Service: Cardiology;  Laterality: N/A;  AF, PEDRO, DCCV, MAC, DM, 3 Prep    TREATMENT OF CARDIAC ARRHYTHMIA N/A 5/15/2020    Procedure: CARDIOVERSION;  Surgeon: Hany Bee MD;  Location: [facility] EP LAB;  Service: Cardiology;  Laterality: N/A;  AF, PEDRO, DCCV, MAC, DM, 3 Prep     Date  4/28/2021   Activity/Exercise   Sets/reps/  equipment   Walking with head turns        Walking with head up & down        Step overs     Over 1/2 foam roll  15 reps forward  15 reps laterally   Step taps     6 inch step   20 reps forward  20 reps cross   Marching   30 reps at rail, no UE assist   Sidestepping   In hodgson   4 reps  10 feet   Crossovers      [place]      Walking  backwards        Tandem walking      Weaving in/out of cones        Picking up cones        Sports cord        ONEOK ball      Figure 8s       Circles right/left      Walking with 360 degree turns      Spirals      Weight shifting:    Left & Right     Blue foams eyes open   Weight shifting: Forward & Backward      Blue foams eyes open   Static Standing Balance        Standing with feet apart     Blue foams eyes open and closed   Standing with feet together        Standing with feet semitandem   Blue foams eyes open and closed   Standing with feet tandem      Single leg stance        Treatment/Session Summary:    · Response to Treatment:  Patient tolerated treatment well with minimal change in overall dizziness. · Equipment provided today:  None today  · Recommendations/Intent for next treatment session: Next visit will focus on improving overall mobility, safety, and balance.     Total Treatment Billable Duration:  40 minutes   PT Patient Time In/Time Out  Time In: 1100  Time Out: [facility], PT    Future Appointments   Date Time Provider Milla Gypsy   4/28/2021 11:00 AM [facility]   4/30/2021 10:30 AM Bhargavi Moody, ALBERT [facility]   5/11/2021 10:30 AM Miranda Baxter MD BSNE BSNE   6/14/2021 10:40 AM [facility]   6/14/2021 11:10 AM Phillip Carballo MD [facility] UPPER GASTROINTESTINAL ENDOSCOPY  2014     Family History   Problem Relation Age of Onset    Heart attack Mother     Stroke Father     Heart failure Brother     Colon cancer Neg Hx     Inflammatory bowel disease Neg Hx      Social History     Tobacco Use    Smoking status: Never    Smokeless tobacco: Never   Substance Use Topics    Alcohol use: No    Drug use: No     Review of Systems   Constitutional:  Negative for fever.   HENT:  Negative for sore throat.    Respiratory:  Negative for shortness of breath.    Cardiovascular:  Negative for chest pain.   Gastrointestinal:  Negative for nausea.   Genitourinary:  Negative for dysuria.   Musculoskeletal:  Negative for back pain.   Skin:  Negative for rash.   Neurological:  Negative for weakness.   Hematological:  Does not bruise/bleed easily.     Physical Exam     Initial Vitals [06/12/23 1317]   BP Pulse Resp Temp SpO2   (!) 144/64 70 18 97.5 °F (36.4 °C) 96 %      MAP       --         Physical Exam    Nursing note and vitals reviewed.  Constitutional: She appears well-developed and well-nourished.   HENT:   Hematoma to the left occipital scalp.  No step-offs   Eyes: Pupils are equal, round, and reactive to light.   Neck: Neck supple.   Neck is nontender and with full range of motion without tenderness.   Normal range of motion.  Cardiovascular:  Normal rate, regular rhythm, normal heart sounds and intact distal pulses.           Pulmonary/Chest: Breath sounds normal. She exhibits no tenderness.   Abdominal: Abdomen is soft. Bowel sounds are normal. There is no abdominal tenderness.   Musculoskeletal:         General: No tenderness. Normal range of motion.      Cervical back: Normal range of motion and neck supple.      Comments: Bilateral hips with full range of motion.  No pain with pelvic rocking     Neurological: She is alert and oriented to person, place, and time.   Skin: Skin is warm and dry.   Psychiatric: She has a normal mood and affect. Her behavior is  normal. Judgment and thought content normal.       ED Course   Procedures  Labs Reviewed - No data to display       Imaging Results              CT Head Without Contrast (Final result)  Result time 06/12/23 16:27:16      Final result by Mariano Worthy MD (06/12/23 16:27:16)                   Impression:      Left parietal scalp hematoma.  No evidence of a calvarial fracture or acute intracranial process.    Age-related changes.      Electronically signed by: Mariano Worthy MD  Date:    06/12/2023  Time:    16:27               Narrative:    EXAMINATION:  CT HEAD WITHOUT CONTRAST    CLINICAL HISTORY:  Head trauma, minor (Age >= 65y);    TECHNIQUE:  Low dose axial images were obtained through the head.  Coronal and sagittal reformations were also performed. Contrast was not administered.    COMPARISON:  MRI of the brain dated 01/11/2022.    CT scan of the head dated 12/04/2021.    FINDINGS:  There is a left parietal scalp hematoma.  The remainder of the subcutaneous tissues are unremarkable.  The calvarium is intact.  The paranasal sinuses are unremarkable.  The mastoid air cells are clear.  The orbits and intraorbital contents are within normal limits.    The craniocervical junction is intact.  The midline structures are unremarkable.  The ventricles and sulci are prominent, suggestive cerebral loss.  There are hypodensities within the periventricular and subcortical white matter.  The gray-white differentiation is maintained.  There is no dense vessel sign.  There is no evidence of mass effect.                                       Medications - No data to display  Medical Decision Making:   Initial Assessment:   History provided by the patient and her   Clinical Tests:   Radiological Study: Ordered  ED Management:  The patient is an 79-year-old female who tripped and fell at home.  She struck her head had no loss of consciousness and has a normal CT scan of her head.  She has no neck tenderness and good range of  motion of her neck.  CT head is negative.  The patient has full range of motion of her extremities.  She has some contusions to her left hand and her left buttocks but does not require x-rays of these areas. She will be discharged at this time.                        Clinical Impression:   Final diagnoses:  [S09.90XA] Injury of head, initial encounter (Primary)  [W19.XXXA] Fall, initial encounter        ED Disposition Condition    Discharge Stable          ED Prescriptions    None       Follow-up Information       Follow up With Specialties Details Why Contact Info    Krzysztof Mcgraw MD Family Medicine Schedule an appointment as soon as possible for a visit  As needed [address]  [address]  [facility] [address]  [phone]               Yumi Bernard MD  06/12/23 6432

## 2023-07-10 DIAGNOSIS — G20 PARKINSON DISEASE (HCC): ICD-10-CM

## 2023-07-10 NOTE — TELEPHONE ENCOUNTER
Patients care giver called requesting a refill of C/L  MG 2 tabs TID. Please send a 30-day supply to [facility] and a 90-day supply to [facility]. Patient is out of medication.

## 2023-08-07 ENCOUNTER — OFFICE VISIT (OUTPATIENT)
Dept: NEUROLOGY | Age: 83
End: 2023-08-07
Payer: MEDICARE

## 2023-08-07 VITALS
WEIGHT: 191 LBS | DIASTOLIC BLOOD PRESSURE: 75 MMHG | HEART RATE: 74 BPM | OXYGEN SATURATION: 94 % | BODY MASS INDEX: 30.7 KG/M2 | HEIGHT: 66 IN | SYSTOLIC BLOOD PRESSURE: 139 MMHG

## 2023-08-07 DIAGNOSIS — R29.3 POSTURAL IMBALANCE: ICD-10-CM

## 2023-08-07 DIAGNOSIS — R13.19 DYSPHAGIA, NEUROLOGIC: ICD-10-CM

## 2023-08-07 DIAGNOSIS — R49.8 HYPOPHONIA: ICD-10-CM

## 2023-08-07 DIAGNOSIS — G20 PARKINSON DISEASE (HCC): Primary | ICD-10-CM

## 2023-08-07 DIAGNOSIS — R25.1 TREMOR: ICD-10-CM

## 2023-08-07 PROCEDURE — G8417 CALC BMI ABV UP PARAM F/U: HCPCS | Performed by: PSYCHIATRY & NEUROLOGY

## 2023-08-07 PROCEDURE — 1036F TOBACCO NON-USER: CPT | Performed by: PSYCHIATRY & NEUROLOGY

## 2023-08-07 PROCEDURE — 99215 OFFICE O/P EST HI 40 MIN: CPT | Performed by: PSYCHIATRY & NEUROLOGY

## 2023-08-07 PROCEDURE — 1090F PRES/ABSN URINE INCON ASSESS: CPT | Performed by: PSYCHIATRY & NEUROLOGY

## 2023-08-07 PROCEDURE — G8427 DOCREV CUR MEDS BY ELIG CLIN: HCPCS | Performed by: PSYCHIATRY & NEUROLOGY

## 2023-08-07 PROCEDURE — G8399 PT W/DXA RESULTS DOCUMENT: HCPCS | Performed by: PSYCHIATRY & NEUROLOGY

## 2023-08-07 PROCEDURE — 1123F ACP DISCUSS/DSCN MKR DOCD: CPT | Performed by: PSYCHIATRY & NEUROLOGY

## 2023-08-07 RX ORDER — DEXTROMETHORPHAN HYDROBROMIDE, BUPROPION HYDROCHLORIDE 105; 45 MG/1; MG/1
TABLET, MULTILAYER, EXTENDED RELEASE ORAL
COMMUNITY
Start: 2023-07-23

## 2023-08-07 NOTE — PROGRESS NOTES
08/07/23  Malcolm Reid        Chief Complaint:  Chief Complaint   Patient presents with    Follow-up     Follow for Parkinson's disease        Parkinson's Disease Diagnosed: tremors started about 1.5 years ago (January 2017) in both hands at rest but may have been more pronounced on right first. She has a history of a right basal ganglia lacunar infarct on head CT in 2008 but her left-sided symptoms started prior to the time of that infarct. She was previously on inderal and primidone; tapered off primidone at her first office visit with Dr Aishwarya Gould in September of 2020. She also has a history of depression, has had an inpatient stay at [facility] and was treated with ECT in 2018. HPI:   Malcolm Reid, 83 y.o.,female here in clinic follow up for continued management of Parkinson's Disease. She was treated for PNA in May. She has recovered well. She is now using 02 at night. She is no longer having dizzy spells. She is now feeling insecure with her walking but has not had any falls. She has not had any PT. But her motor sx are well controlled. Her mood is stable. She has had some issues with solids with dry solids. She has a strong voice. No constipation. Sleeping well. Denies RBD. Current Neuro related meds:  She is on zyprexa 10 mg at HS, unable to wean, severe anxiety.    Melatonin 9 mg for sleep, denies RBD  Sinemet 25/100 mg 1.5 tabs at 7am, 5pm and 2 tabs at noon,   Sinemet CR 25/100mg 1 tab Qhs 9pm                   Review of Systems       Past Medical History:   Diagnosis Date    Anxiety     Breast cancer ([address])     Cancer ([address])     melanoma    Cancer of right breast ([address]) Dx 04/2019    Crohn's disease ([address])     Heart failure ([address])     History of chemotherapy     Hypertension     Menopause     Psychiatric disorder     anxiety/depression    Radiation therapy complication     Thyroid disease         Past Surgical History:   Procedure Laterality Date    BREAST BIOPSY      BREAST

## 2023-08-15 ENCOUNTER — HOSPITAL ENCOUNTER (OUTPATIENT)
Dept: MAMMOGRAPHY | Age: 83
Discharge: HOME | End: 2023-08-18
Attending: INTERNAL MEDICINE
Payer: MEDICARE

## 2023-08-15 DIAGNOSIS — Z17.0 ESTROGEN RECEPTOR POSITIVE STATUS (ER+): ICD-10-CM

## 2023-08-15 DIAGNOSIS — Z17.0 MALIGNANT NEOPLASM OF RIGHT BREAST IN FEMALE, ESTROGEN RECEPTOR POSITIVE, UNSPECIFIED SITE OF BREAST (HCC): ICD-10-CM

## 2023-08-15 DIAGNOSIS — Z12.31 ENCOUNTER FOR SCREENING MAMMOGRAM FOR MALIGNANT NEOPLASM OF BREAST: ICD-10-CM

## 2023-08-15 DIAGNOSIS — C50.911 MALIGNANT NEOPLASM OF RIGHT BREAST IN FEMALE, ESTROGEN RECEPTOR POSITIVE, UNSPECIFIED SITE OF BREAST (HCC): ICD-10-CM

## 2023-08-15 PROCEDURE — 77067 SCR MAMMO BI INCL CAD: CPT

## 2023-08-22 ENCOUNTER — HOSPITAL ENCOUNTER (OUTPATIENT)
Dept: LAB | Age: 83
Discharge: HOME | End: 2023-08-25
Payer: MEDICARE

## 2023-08-22 ENCOUNTER — OFFICE VISIT (OUTPATIENT)
Dept: ONCOLOGY | Age: 83
End: 2023-08-22
Payer: MEDICARE

## 2023-08-22 VITALS
HEIGHT: 66 IN | SYSTOLIC BLOOD PRESSURE: 153 MMHG | WEIGHT: 190.8 LBS | RESPIRATION RATE: 19 BRPM | OXYGEN SATURATION: 93 % | DIASTOLIC BLOOD PRESSURE: 77 MMHG | HEART RATE: 84 BPM | BODY MASS INDEX: 30.67 KG/M2 | TEMPERATURE: 97.6 F

## 2023-08-22 DIAGNOSIS — R05.3 PERSISTENT DRY COUGH: ICD-10-CM

## 2023-08-22 DIAGNOSIS — C50.911 MALIGNANT NEOPLASM OF RIGHT BREAST IN FEMALE, ESTROGEN RECEPTOR POSITIVE, UNSPECIFIED SITE OF BREAST (HCC): Primary | ICD-10-CM

## 2023-08-22 DIAGNOSIS — C50.911 MALIGNANT NEOPLASM OF RIGHT BREAST IN FEMALE, ESTROGEN RECEPTOR POSITIVE, UNSPECIFIED SITE OF BREAST (HCC): ICD-10-CM

## 2023-08-22 DIAGNOSIS — Z17.0 MALIGNANT NEOPLASM OF RIGHT BREAST IN FEMALE, ESTROGEN RECEPTOR POSITIVE, UNSPECIFIED SITE OF BREAST (HCC): ICD-10-CM

## 2023-08-22 DIAGNOSIS — Z17.0 MALIGNANT NEOPLASM OF RIGHT BREAST IN FEMALE, ESTROGEN RECEPTOR POSITIVE, UNSPECIFIED SITE OF BREAST (HCC): Primary | ICD-10-CM

## 2023-08-22 LAB
ALBUMIN SERPL-MCNC: 3.6 G/DL (ref 3.2–4.6)
ALBUMIN/GLOB SERPL: 0.9 (ref 0.4–1.6)
ALP SERPL-CCNC: 152 U/L (ref 50–136)
ALT SERPL-CCNC: 11 U/L (ref 12–65)
ANION GAP SERPL CALC-SCNC: 4 MMOL/L (ref 2–11)
AST SERPL-CCNC: 18 U/L (ref 15–37)
BASOPHILS # BLD: 0 K/UL (ref 0–0.2)
BASOPHILS NFR BLD: 1 % (ref 0–2)
BILIRUB SERPL-MCNC: 0.4 MG/DL (ref 0.2–1.1)
BUN SERPL-MCNC: 16 MG/DL (ref 8–23)
CALCIUM SERPL-MCNC: 10 MG/DL (ref 8.3–10.4)
CHLORIDE SERPL-SCNC: 110 MMOL/L (ref 101–110)
CO2 SERPL-SCNC: 28 MMOL/L (ref 21–32)
CREAT SERPL-MCNC: 0.9 MG/DL (ref 0.6–1)
DIFFERENTIAL METHOD BLD: ABNORMAL
EOSINOPHIL # BLD: 0.1 K/UL (ref 0–0.8)
EOSINOPHIL NFR BLD: 2 % (ref 0.5–7.8)
ERYTHROCYTE [DISTWIDTH] IN BLOOD BY AUTOMATED COUNT: 14.9 % (ref 11.9–14.6)
GLOBULIN SER CALC-MCNC: 4.2 G/DL (ref 2.8–4.5)
GLUCOSE SERPL-MCNC: 128 MG/DL (ref 65–100)
HCT VFR BLD AUTO: 46.3 % (ref 35.8–46.3)
HGB BLD-MCNC: 14.4 G/DL (ref 11.7–15.4)
IMM GRANULOCYTES # BLD AUTO: 0 K/UL (ref 0–0.5)
IMM GRANULOCYTES NFR BLD AUTO: 0 % (ref 0–5)
LYMPHOCYTES # BLD: 1.4 K/UL (ref 0.5–4.6)
LYMPHOCYTES NFR BLD: 25 % (ref 13–44)
MCH RBC QN AUTO: 29 PG (ref 26.1–32.9)
MCHC RBC AUTO-ENTMCNC: 31.1 G/DL (ref 31.4–35)
MCV RBC AUTO: 93.3 FL (ref 82–102)
MONOCYTES # BLD: 0.4 K/UL (ref 0.1–1.3)
MONOCYTES NFR BLD: 6 % (ref 4–12)
NEUTS SEG # BLD: 3.7 K/UL (ref 1.7–8.2)
NEUTS SEG NFR BLD: 67 % (ref 43–78)
NRBC # BLD: 0 K/UL (ref 0–0.2)
PLATELET # BLD AUTO: 198 K/UL (ref 150–450)
PMV BLD AUTO: 9.6 FL (ref 9.4–12.3)
POTASSIUM SERPL-SCNC: 4.7 MMOL/L (ref 3.5–5.1)
PROT SERPL-MCNC: 7.8 G/DL (ref 6.3–8.2)
RBC # BLD AUTO: 4.96 M/UL (ref 4.05–5.2)
SODIUM SERPL-SCNC: 142 MMOL/L (ref 133–143)
WBC # BLD AUTO: 5.5 K/UL (ref 4.3–11.1)

## 2023-08-22 PROCEDURE — 1123F ACP DISCUSS/DSCN MKR DOCD: CPT | Performed by: INTERNAL MEDICINE

## 2023-08-22 PROCEDURE — 36415 COLL VENOUS BLD VENIPUNCTURE: CPT

## 2023-08-22 PROCEDURE — G8427 DOCREV CUR MEDS BY ELIG CLIN: HCPCS | Performed by: INTERNAL MEDICINE

## 2023-08-22 PROCEDURE — 1090F PRES/ABSN URINE INCON ASSESS: CPT | Performed by: INTERNAL MEDICINE

## 2023-08-22 PROCEDURE — 99214 OFFICE O/P EST MOD 30 MIN: CPT | Performed by: INTERNAL MEDICINE

## 2023-08-22 PROCEDURE — G8399 PT W/DXA RESULTS DOCUMENT: HCPCS | Performed by: INTERNAL MEDICINE

## 2023-08-22 PROCEDURE — 80053 COMPREHEN METABOLIC PANEL: CPT

## 2023-08-22 PROCEDURE — G8417 CALC BMI ABV UP PARAM F/U: HCPCS | Performed by: INTERNAL MEDICINE

## 2023-08-22 PROCEDURE — 1036F TOBACCO NON-USER: CPT | Performed by: INTERNAL MEDICINE

## 2023-08-22 PROCEDURE — 85025 COMPLETE CBC W/AUTO DIFF WBC: CPT

## 2023-08-22 RX ORDER — ANASTROZOLE 1 MG/1
1 TABLET ORAL DAILY
Qty: 90 TABLET | Refills: 3 | Status: SHIPPED | OUTPATIENT
Start: 2023-08-22

## 2023-08-22 ASSESSMENT — PATIENT HEALTH QUESTIONNAIRE - PHQ9
2. FEELING DOWN, DEPRESSED OR HOPELESS: 1
1. LITTLE INTEREST OR PLEASURE IN DOING THINGS: 1
SUM OF ALL RESPONSES TO PHQ QUESTIONS 1-9: 2
SUM OF ALL RESPONSES TO PHQ9 QUESTIONS 1 & 2: 2
SUM OF ALL RESPONSES TO PHQ QUESTIONS 1-9: 2

## 2023-08-22 NOTE — PROGRESS NOTES
[facility] Hematology & Oncology: Office Visit Progress Note      Chief Complaint:     Breast cancer ER+ HER2+      History of Present Illness:   Reason for Referral:  Breast Ca       Referring Provider:  Dr. Uriel Hawkins       Primary Care Provider:  None documented       Family History of Cancer/Hematologic disorders: Personal Hx: Melanoma; Sister, Melanoma; Brother, Lung Ca       Presenting Symptoms:  Abnormal Imaging of R Breast       Ms. Robin Greenberg is a 79 y.o. female. She presented to Dr. Brian Hernandez on 3/22/19 for consultation regarding abnormal imaging of routine screening  mammogram.  She reported no abnormalities on SBE. She has had one breast biopsy years ago what was benign. Her ROS was normal.          3/18/19 MAMMOGRAM/US R BREAST   MAMMOGRAM: ML and spot compression views confirm the presence of a spiculated, approximately 1.5 cm mass in the upper inner quadrant of the right breast.  No significant calcifications are present. ULTRASOUND: There is an irregular, hypoechoic, approximately 1.5 cm longest dimension mass at the 2 o'clock position of the breast, 3 cm from nipple, corresponding to the mammographic findings and height is suggestive of malignancy. IMPRESSION:  BI-RADS 5. Highly suspicious of malignancy. Ultrasound guided biopsy should be performed. 3/29/19 PATHOLOGY DIAGNOSIS   RIGHT BREAST MASS, MAMMOTOME BIOPSY:   -Invasive ductal carcinoma   No lymphovascular invasion is identified   ER: Positive 100%   TN: Positive 10%   HER-2: Equivocal (2+)       Notes from referring Provider:  n/a       Other pertinent information:  Non-smoker. Her Med Hx includes anemia, anxiety, Crohns, GERD, depression, heart murmur, melanoma, HTN,  osteoarthritis, sinusitis, insomnia, Hypothyroid, hyperlipidemia, et al.  She receives Humira injections weekly. Interim history update in A/P. Review of Systems:      Constitutional  Denies fever or chills.   Denies weight

## 2023-08-25 ENCOUNTER — HOSPITAL ENCOUNTER (OUTPATIENT)
Dept: CT IMAGING | Age: 83
End: 2023-08-25
Attending: INTERNAL MEDICINE
Payer: MEDICARE

## 2023-08-25 DIAGNOSIS — C50.911 MALIGNANT NEOPLASM OF RIGHT BREAST IN FEMALE, ESTROGEN RECEPTOR POSITIVE, UNSPECIFIED SITE OF BREAST (HCC): ICD-10-CM

## 2023-08-25 DIAGNOSIS — Z17.0 MALIGNANT NEOPLASM OF RIGHT BREAST IN FEMALE, ESTROGEN RECEPTOR POSITIVE, UNSPECIFIED SITE OF BREAST (HCC): ICD-10-CM

## 2023-08-25 PROCEDURE — 71260 CT THORAX DX C+: CPT

## 2023-08-25 PROCEDURE — 2580000003 HC RX 258: Performed by: INTERNAL MEDICINE

## 2023-08-25 PROCEDURE — 6360000004 HC RX CONTRAST MEDICATION: Performed by: INTERNAL MEDICINE

## 2023-08-25 RX ORDER — 0.9 % SODIUM CHLORIDE 0.9 %
100 INTRAVENOUS SOLUTION INTRAVENOUS
Status: COMPLETED | OUTPATIENT
Start: 2023-08-25 | End: 2023-08-25

## 2023-08-25 RX ORDER — SODIUM CHLORIDE 0.9 % (FLUSH) 0.9 %
10 SYRINGE (ML) INJECTION
Status: COMPLETED | OUTPATIENT
Start: 2023-08-25 | End: 2023-08-25

## 2023-08-25 RX ADMIN — SODIUM CHLORIDE 100 ML: 9 INJECTION, SOLUTION INTRAVENOUS at 13:34

## 2023-08-25 RX ADMIN — SODIUM CHLORIDE, PRESERVATIVE FREE 10 ML: 5 INJECTION INTRAVENOUS at 13:34

## 2023-08-25 RX ADMIN — IOPAMIDOL 100 ML: 755 INJECTION, SOLUTION INTRAVENOUS at 13:33

## 2023-10-06 ENCOUNTER — OFFICE VISIT (OUTPATIENT)
Age: 83
End: 2023-10-06
Payer: MEDICARE

## 2023-10-06 DIAGNOSIS — Z74.09 DECREASED MOBILITY IN BED: ICD-10-CM

## 2023-10-06 DIAGNOSIS — G20.A1 PARKINSON'S DISEASE WITHOUT FLUCTUATING MANIFESTATIONS, UNSPECIFIED WHETHER DYSKINESIA PRESENT: Primary | ICD-10-CM

## 2023-10-06 DIAGNOSIS — Z74.09 IMPAIRED TRANSFERS: ICD-10-CM

## 2023-10-06 DIAGNOSIS — R26.81 UNSTEADINESS ON FEET: ICD-10-CM

## 2023-10-06 DIAGNOSIS — R53.1 GENERALIZED WEAKNESS: ICD-10-CM

## 2023-10-06 DIAGNOSIS — R29.3 ABNORMAL POSTURE: ICD-10-CM

## 2023-10-06 DIAGNOSIS — Z91.81 AT HIGH RISK FOR FALLS: ICD-10-CM

## 2023-10-06 DIAGNOSIS — R26.9 GAIT ABNORMALITY: ICD-10-CM

## 2023-10-06 PROCEDURE — 97161 PT EVAL LOW COMPLEX 20 MIN: CPT

## 2023-10-06 NOTE — PROGRESS NOTES
1. 43 1.31      60s 1.33 1.24      70s 1.26 1.13      80s 0.97 0.94   **Parkinson's Disease MDC = SS - 0.18 m/sec, F - 0.25 m/sec. [address] and Rafita, 2008)  **Geriatric MCID = SS - 0.13 m/sec Sukhdev Santos et al., 2006)  **Stroke MCID = SS - 0.14 m/sec Sukhdev Santos et al., 2006)  **SCI MCID = SS - 0.15 m/sec, F - 0.25 m/sec (Lara and Karin, 2004)  2. Timed Up and Go(TUG) test:   Basic - 16.72 seconds (Greater than 11.5 seconds associated with increased risk for falling in people living with PD - Shaq et al., 2011) (**Parkinson's Disease MDC = 4.85 points; Abdi Garcia et al., 2011, PD)  Cognitive - 16.75 seconds but unable to perform secondary task(Greater than 15 seconds associated with increased risk for falls in older adults - Rosa et al., 2000)      COMMUNICATION/EDUCATION:   Educated pt and her niece on the roles/goals of outpatient neurological physical therapy with both verbalizing understanding and agreeable to proceeding.

## 2023-10-16 ENCOUNTER — OFFICE VISIT (OUTPATIENT)
Age: 83
End: 2023-10-16

## 2023-10-16 DIAGNOSIS — R53.1 GENERALIZED WEAKNESS: ICD-10-CM

## 2023-10-16 DIAGNOSIS — Z74.09 DECREASED MOBILITY IN BED: ICD-10-CM

## 2023-10-16 DIAGNOSIS — R26.9 GAIT ABNORMALITY: ICD-10-CM

## 2023-10-16 DIAGNOSIS — R26.81 UNSTEADINESS ON FEET: ICD-10-CM

## 2023-10-16 DIAGNOSIS — G20.A1 PARKINSON'S DISEASE WITHOUT FLUCTUATING MANIFESTATIONS, UNSPECIFIED WHETHER DYSKINESIA PRESENT: Primary | ICD-10-CM

## 2023-10-16 DIAGNOSIS — R29.3 ABNORMAL POSTURE: ICD-10-CM

## 2023-10-16 DIAGNOSIS — Z91.81 AT HIGH RISK FOR FALLS: ICD-10-CM

## 2023-10-16 DIAGNOSIS — Z74.09 IMPAIRED TRANSFERS: ICD-10-CM

## 2023-10-16 NOTE — PROGRESS NOTES
Physical Therapy Daily Note     Referring MD: Jovany Cardona MD  Diagnosis:     ICD-10-CM    1. Parkinson's disease without fluctuating manifestations, unspecified whether dyskinesia present  G20. A1       2. Gait abnormality  R26.9       3. Unsteadiness on feet  R26.81       4. Generalized weakness  R53.1       5. At high risk for falls  Z91.81       6. Impaired transfers  Z74.09       7. Decreased mobility in bed  Z74.09       8. Abnormal posture  R29.3          Therapy precautions:Fall risk and Gait belt for dynamic standing activity  Co-morbidities affecting plan of care: h/o lumbar laminectomy, h/o breast cancer  Start Time: 12:58 PM    Stop Time: 1:55  Total Timed Procedure Codes: 55 min, Total Time: 57 min  Visit Count: 2                                          Visits since last evaluative note: 1  POC Expiration: 1/14/24    ASSESSMENT:   [] Progressing toward goals. [] No change. [x] Other: Pt would continues to have R BPPV therefore Epley maneuver was performed again today and remainder of visit was used to establish LE strengthening HEP which she tolerated well. [x] Patient would continue to benefit from skilled physical therapy services in order to: Re-assess Rienzi-Hallpike test at next visit for R BPPV, progress vestibular HEP, progress LE strength     PLAN:   PLAN FOR FUTURE VISITS:   [] Continue current frequency toward long and short term goals. [x] Specific Instructions for subsequent treatments: Re-assess Paul-Hallpike test at next visit for R BPPV, progress vestibular HEP, progress LE strength    SUBJECTIVE:     Pt reporting 0/10 pain and no falls since her last visit. Says she had one episode of \"dizziness\" this morning when getting out of bed however has not had any other episodes since her initial evaluation. Says she did not sleep well last night and not sure if that played into the return of the sx.      OBJECTIVE:     THERAPEUTIC EXERCISE: (55 minutes):    Nustep level 5 x

## 2023-11-06 ENCOUNTER — OFFICE VISIT (OUTPATIENT)
Age: 83
End: 2023-11-06

## 2023-11-06 DIAGNOSIS — R53.1 GENERALIZED WEAKNESS: ICD-10-CM

## 2023-11-06 DIAGNOSIS — R26.9 GAIT ABNORMALITY: ICD-10-CM

## 2023-11-06 DIAGNOSIS — Z74.09 DECREASED MOBILITY IN BED: ICD-10-CM

## 2023-11-06 DIAGNOSIS — Z91.81 AT HIGH RISK FOR FALLS: ICD-10-CM

## 2023-11-06 DIAGNOSIS — R29.3 ABNORMAL POSTURE: ICD-10-CM

## 2023-11-06 DIAGNOSIS — Z74.09 IMPAIRED TRANSFERS: ICD-10-CM

## 2023-11-06 DIAGNOSIS — R26.81 UNSTEADINESS ON FEET: ICD-10-CM

## 2023-11-06 DIAGNOSIS — G20.A1 PARKINSON'S DISEASE WITHOUT FLUCTUATING MANIFESTATIONS, UNSPECIFIED WHETHER DYSKINESIA PRESENT: Primary | ICD-10-CM

## 2023-11-06 NOTE — PROGRESS NOTES
Physical Therapy Daily Note     Referring MD: Pershing Aschoff, MD  Diagnosis:     ICD-10-CM    1. Parkinson's disease without fluctuating manifestations, unspecified whether dyskinesia present  G20. A1       2. Gait abnormality  R26.9       3. Unsteadiness on feet  R26.81       4. Generalized weakness  R53.1       5. At high risk for falls  Z91.81       6. Impaired transfers  Z74.09       7. Decreased mobility in bed  Z74.09       8. Abnormal posture  R29.3            Therapy precautions:Fall risk and Gait belt for dynamic standing activity  Co-morbidities affecting plan of care: h/o lumbar laminectomy, h/o breast cancer  Start Time: 11:06 AM    Stop Time: 12:00 PM  Total Timed Procedure Codes: 40 min, Total Time: 54 min  Visit Count: 3                                          Visits since last evaluative note: 2  POC Expiration: 1/14/24    ASSESSMENT:   [] Progressing toward goals. [] No change. [x] Other: Initiated Vu-Daroff exercises for home habituation of vertiginal sx with getting out of bed; pt reports some symptoms each time when transitioning supine<>sidelying on the L side. Issued standing VOR x 1 for HEP to help with vestibular integration as well; no significant sx noted when performing during her visit and she had good stability when on a firm surface. [x] Patient would continue to benefit from skilled physical therapy services in order to: progress vestibular HEP, progress LE strength     PLAN:   PLAN FOR FUTURE VISITS:   [] Continue current frequency toward long and short term goals. [x] Specific Instructions for subsequent treatments: Re-assess Paul-Hallpike test at next visit for R BPPV, progress vestibular HEP, progress LE strength    SUBJECTIVE:     Pt reporting 0/10 pain. Says she had one falls since her last visit when she was at her home; says she was standing and the next thing she knows she was on the floor.   Says her head hit a small fan when she fell but she did not

## 2023-11-07 RX ORDER — ANASTROZOLE 1 MG/1
TABLET ORAL
Qty: 90 TABLET | Refills: 3 | OUTPATIENT
Start: 2023-11-07

## 2023-11-10 ENCOUNTER — OFFICE VISIT (OUTPATIENT)
Age: 83
End: 2023-11-10

## 2023-11-10 DIAGNOSIS — R26.9 GAIT ABNORMALITY: ICD-10-CM

## 2023-11-10 DIAGNOSIS — G20.A1 PARKINSON'S DISEASE WITHOUT FLUCTUATING MANIFESTATIONS, UNSPECIFIED WHETHER DYSKINESIA PRESENT: Primary | ICD-10-CM

## 2023-11-10 DIAGNOSIS — Z74.09 DECREASED MOBILITY IN BED: ICD-10-CM

## 2023-11-10 DIAGNOSIS — Z91.81 AT HIGH RISK FOR FALLS: ICD-10-CM

## 2023-11-10 DIAGNOSIS — R29.3 ABNORMAL POSTURE: ICD-10-CM

## 2023-11-10 DIAGNOSIS — Z74.09 IMPAIRED TRANSFERS: ICD-10-CM

## 2023-11-10 DIAGNOSIS — R53.1 GENERALIZED WEAKNESS: ICD-10-CM

## 2023-11-10 DIAGNOSIS — R26.81 UNSTEADINESS ON FEET: ICD-10-CM

## 2023-11-10 NOTE — PROGRESS NOTES
Physical Therapy Daily Note     Referring MD: Grecia Gonzalez MD  Diagnosis:     ICD-10-CM    1. Parkinson's disease without fluctuating manifestations, unspecified whether dyskinesia present  G20. A1       2. Gait abnormality  R26.9       3. Unsteadiness on feet  R26.81       4. Generalized weakness  R53.1       5. Impaired transfers  Z74.09       6. At high risk for falls  Z91.81       7. Decreased mobility in bed  Z74.09       8. Abnormal posture  R29.3          Therapy precautions:Fall risk and Gait belt for dynamic standing activity  Co-morbidities affecting plan of care: h/o lumbar laminectomy, h/o breast cancer  Start Time: 10:06 AM    Stop Time: 11:00 AM  Total Timed Procedure Codes: 45 min, Total Time: 54 min  Visit Count: 4                                          Visits since last evaluative note: 3  POC Expiration: 1/14/24    ASSESSMENT:   [] Progressing toward goals. [] No change. [x] Other: Pt experienced continued but less intense vertiginal sx with Vu-Daroff exercises today compared to her last visit. Able to progress standing VOR x 1 and add VOR x 2 interventions with overall good performance. It is likely the recently discovered OH is a stronger contributor to her falls then vestibular dysfunction. Will plan to have f/u with pt in the middle of next week to allow ample time for new BP medication to take effect and resolve OH sx. [x] Patient would continue to benefit from skilled physical therapy services in order to: progress vestibular HEP, progress LE strength     PLAN:   PLAN FOR FUTURE VISITS:   [] Continue current frequency toward long and short term goals. [x] Specific Instructions for subsequent treatments: Re-assess [place]-Hallpike test at next visit for R BPPV, progress vestibular HEP, progress LE strength    SUBJECTIVE:     Pt reporting 0/10 pain. Reports had another syncope episode this past Tuesday where she was leaving a restaurant and she lost consciousness.

## 2023-11-13 DIAGNOSIS — G20.A1 PARKINSON DISEASE: ICD-10-CM

## 2023-11-13 RX ORDER — CARBIDOPA AND LEVODOPA 25; 100 MG/1; MG/1
1 TABLET, EXTENDED RELEASE ORAL
Qty: 90 TABLET | Refills: 3 | OUTPATIENT
Start: 2023-11-13

## 2023-11-15 ENCOUNTER — OFFICE VISIT (OUTPATIENT)
Age: 83
End: 2023-11-15

## 2023-11-15 DIAGNOSIS — R53.1 GENERALIZED WEAKNESS: ICD-10-CM

## 2023-11-15 DIAGNOSIS — Z74.09 DECREASED MOBILITY IN BED: ICD-10-CM

## 2023-11-15 DIAGNOSIS — G20.A1 PARKINSON'S DISEASE, UNSPECIFIED WHETHER DYSKINESIA PRESENT, UNSPECIFIED WHETHER MANIFESTATIONS FLUCTUATE: Primary | ICD-10-CM

## 2023-11-15 DIAGNOSIS — R26.9 GAIT ABNORMALITY: ICD-10-CM

## 2023-11-15 DIAGNOSIS — Z74.09 IMPAIRED TRANSFERS: ICD-10-CM

## 2023-11-15 DIAGNOSIS — R26.81 UNSTEADINESS ON FEET: ICD-10-CM

## 2023-11-15 DIAGNOSIS — R29.3 POSTURAL IMBALANCE: ICD-10-CM

## 2023-11-15 DIAGNOSIS — R29.3 ABNORMAL POSTURE: ICD-10-CM

## 2023-11-15 DIAGNOSIS — Z91.81 AT HIGH RISK FOR FALLS: ICD-10-CM

## 2023-11-15 NOTE — PROGRESS NOTES
reps, CGA. PATIENT EDUCATION:     Discussed progress towards LTG and encouraged continued compliance with vestibular HEP; pt verbalized understanding.

## 2023-11-28 DIAGNOSIS — G20.A1 PARKINSON DISEASE: ICD-10-CM

## 2023-11-29 NOTE — TELEPHONE ENCOUNTER
Reason for call: Prescription refill    Medication: Sinemet ER  (1 Tab QHS)    Pharmacy: [facility]    Last visit: 8/7/23    Next visit: 7/15/39    Contact information: [phone]

## 2023-11-30 ENCOUNTER — OFFICE VISIT (OUTPATIENT)
Age: 83
End: 2023-11-30
Payer: MEDICARE

## 2023-11-30 DIAGNOSIS — R26.9 GAIT ABNORMALITY: ICD-10-CM

## 2023-11-30 DIAGNOSIS — H81.12 BPPV (BENIGN PAROXYSMAL POSITIONAL VERTIGO), LEFT: ICD-10-CM

## 2023-11-30 DIAGNOSIS — G20.A1 PARKINSON'S DISEASE, UNSPECIFIED WHETHER DYSKINESIA PRESENT, UNSPECIFIED WHETHER MANIFESTATIONS FLUCTUATE: Primary | ICD-10-CM

## 2023-11-30 DIAGNOSIS — R26.81 UNSTEADINESS ON FEET: ICD-10-CM

## 2023-11-30 PROCEDURE — 97110 THERAPEUTIC EXERCISES: CPT

## 2023-11-30 PROCEDURE — 97112 NEUROMUSCULAR REEDUCATION: CPT

## 2023-11-30 RX ORDER — CARBIDOPA AND LEVODOPA 25; 100 MG/1; MG/1
1 TABLET, EXTENDED RELEASE ORAL NIGHTLY
Qty: 90 TABLET | Refills: 3 | Status: SHIPPED | OUTPATIENT
Start: 2023-11-30

## 2023-11-30 NOTE — PROGRESS NOTES
Physical Therapy Progress Report     Referring MD: Grecia Gonzalez MD  Diagnosis:     ICD-10-CM    1. Parkinson's disease, unspecified whether dyskinesia present, unspecified whether manifestations fluctuate  G20. A1       2. BPPV (benign paroxysmal positional vertigo), left  H81.12       3. Unsteadiness on feet  R26.81       4. Gait abnormality  R26.9          Therapy precautions:Fall risk and Gait belt for dynamic standing activity  Co-morbidities affecting plan of care: h/o lumbar laminectomy, h/o breast cancer  Start Time: 11:03 AM    Stop Time: 12:00 PM  Total Timed Procedure Codes: 30 min, Total Time: 40 min  Visit Count: 6                                          Visits since last evaluative note: 1  POC Expiration: 1/14/24    ASSESSMENT:   [x] Progressing toward goals. [] No change. [x] Other: Pt continues to have vertiginal sx with Brand-Daroff exercises to the L, occasionally when getting out of bed and when getting up quickly from the chair. Assessment of canalithiasis in the L horizontal canal was performed today and found to be present. BBQ roll maneuver was performed and repeat testing revealed resolution of this canalithiasis of the L horizontal canal.  She was instructed to continued with her previous HEP but to stop Vu-Daroff exercises. Will plan to f/u with pt via phone in 1-2 weeks. [x] Patient would continue to benefit from skilled physical therapy services in order to: progress vestibular HEP, progress LE strength     PLAN:   PLAN FOR FUTURE VISITS:   [] Continue current frequency toward long and short term goals. [x] Specific Instructions for subsequent treatments: f/u in 1- 2 weeks via phone call. SUBJECTIVE:     Pt reporting 0/10 pain and no falls or near falls since her last visit. Says she continues to have brief vertiginal sx with Vu-Daroff exercises at home but only on the first repetition to the left side only; says the sx only last for seconds.   Feels

## 2024-01-23 DIAGNOSIS — G20.A1 PARKINSON DISEASE: ICD-10-CM

## 2024-02-06 ENCOUNTER — OFFICE VISIT (OUTPATIENT)
Dept: NEUROLOGY | Age: 84
End: 2024-02-06
Payer: MEDICARE

## 2024-02-06 VITALS
DIASTOLIC BLOOD PRESSURE: 82 MMHG | OXYGEN SATURATION: 92 % | BODY MASS INDEX: 32.61 KG/M2 | WEIGHT: 199 LBS | SYSTOLIC BLOOD PRESSURE: 165 MMHG | HEART RATE: 79 BPM

## 2024-02-06 DIAGNOSIS — F32.A ANXIETY AND DEPRESSION: ICD-10-CM

## 2024-02-06 DIAGNOSIS — G20.A1 PARKINSON'S DISEASE WITHOUT DYSKINESIA OR FLUCTUATING MANIFESTATIONS: ICD-10-CM

## 2024-02-06 DIAGNOSIS — R25.1 TREMOR: ICD-10-CM

## 2024-02-06 DIAGNOSIS — R29.3 POSTURAL IMBALANCE: Primary | ICD-10-CM

## 2024-02-06 DIAGNOSIS — F41.9 ANXIETY AND DEPRESSION: ICD-10-CM

## 2024-02-06 PROCEDURE — G8417 CALC BMI ABV UP PARAM F/U: HCPCS | Performed by: PSYCHIATRY & NEUROLOGY

## 2024-02-06 PROCEDURE — G8399 PT W/DXA RESULTS DOCUMENT: HCPCS | Performed by: PSYCHIATRY & NEUROLOGY

## 2024-02-06 PROCEDURE — 1090F PRES/ABSN URINE INCON ASSESS: CPT | Performed by: PSYCHIATRY & NEUROLOGY

## 2024-02-06 PROCEDURE — 99215 OFFICE O/P EST HI 40 MIN: CPT | Performed by: PSYCHIATRY & NEUROLOGY

## 2024-02-06 PROCEDURE — G2212 PROLONG OUTPT/OFFICE VIS: HCPCS | Performed by: PSYCHIATRY & NEUROLOGY

## 2024-02-06 PROCEDURE — 1036F TOBACCO NON-USER: CPT | Performed by: PSYCHIATRY & NEUROLOGY

## 2024-02-06 PROCEDURE — G8427 DOCREV CUR MEDS BY ELIG CLIN: HCPCS | Performed by: PSYCHIATRY & NEUROLOGY

## 2024-02-06 PROCEDURE — 1123F ACP DISCUSS/DSCN MKR DOCD: CPT | Performed by: PSYCHIATRY & NEUROLOGY

## 2024-02-06 PROCEDURE — G8484 FLU IMMUNIZE NO ADMIN: HCPCS | Performed by: PSYCHIATRY & NEUROLOGY

## 2024-02-06 RX ORDER — TURMERIC/TURMERIC EXT/PEPR EXT 900-100 MG
1000 CAPSULE ORAL 2 TIMES DAILY
COMMUNITY

## 2024-02-06 RX ORDER — OLOPATADINE HYDROCHLORIDE 1 MG/ML
1 SOLUTION/ DROPS OPHTHALMIC 2 TIMES DAILY
COMMUNITY

## 2024-02-06 RX ORDER — CETIRIZINE HYDROCHLORIDE 10 MG/1
10 TABLET ORAL DAILY
COMMUNITY

## 2024-02-06 NOTE — PROGRESS NOTES
02/06/24  Coral Tran        Chief Complaint:  Chief Complaint   Patient presents with    Follow-up       Follow for Parkinson's disease         Parkinson's Disease Diagnosed: tremors started about 1.5 years ago (January 2017) in both hands at rest but may have been more pronounced on right first. She has a history of a right basal ganglia lacunar infarct on head CT in 2008 but her left-sided symptoms started prior to the time of that infarct.She was previously on inderal and primidone; tapered off primidone at her first office visit with Dr Villavicencio in September of 2020. She also has a history of depression, has had an inpatient stay at [facility] and was treated with ECT in 2018.       HPI:   Coral Tran, 83 y.o.,female here in clinic follow up for continued management of Parkinson's Disease. She has been doing well. No dizziness or lightheadedness since finishing PT. Balance is also improved. Sleeping through the night. Swallowing is ok. Voice is strong.   Anxiety is stable with zyprexa.   She is not wearing off btw doses of sinemet. She feels she is stable.    Current Neuro related meds:  She is on zyprexa 10 mg at HS, unable to wean, severe anxiety.   Melatonin 10 mg for sleep, denies RBD  Sinemet 25/100 mg 1.5 tabs at 7am, 5pm and 2 tabs at noon,   Sinemet CR 25/100mg 1 tab Qhs 9pm          Review of Systems   Constitutional:  Negative for appetite change.   HENT:  Negative for hearing loss and tinnitus.    Eyes:  Negative for visual disturbance.   Gastrointestinal:  Negative for constipation.   Musculoskeletal:  Positive for back pain. Negative for neck pain.   Neurological:  Negative for dizziness, tremors, seizures, speech difficulty, numbness and headaches.   Psychiatric/Behavioral:  Negative for hallucinations and sleep disturbance. The patient is nervous/anxious.           Past Medical History:   Diagnosis Date    Anxiety     Breast cancer (HCC)     Cancer (HCC)     melanoma    Cancer

## 2024-08-08 ENCOUNTER — OFFICE VISIT (OUTPATIENT)
Dept: NEUROLOGY | Age: 84
End: 2024-08-08
Payer: MEDICARE

## 2024-08-08 VITALS
BODY MASS INDEX: 33.1 KG/M2 | DIASTOLIC BLOOD PRESSURE: 84 MMHG | OXYGEN SATURATION: 90 % | HEART RATE: 89 BPM | SYSTOLIC BLOOD PRESSURE: 163 MMHG | WEIGHT: 202 LBS

## 2024-08-08 DIAGNOSIS — R25.1 TREMOR: Primary | ICD-10-CM

## 2024-08-08 DIAGNOSIS — G20.A1 PARKINSON'S DISEASE WITHOUT DYSKINESIA OR FLUCTUATING MANIFESTATIONS (HCC): ICD-10-CM

## 2024-08-08 DIAGNOSIS — Z79.899 ENCOUNTER FOR LONG-TERM (CURRENT) USE OF HIGH-RISK MEDICATION: ICD-10-CM

## 2024-08-08 PROCEDURE — G8399 PT W/DXA RESULTS DOCUMENT: HCPCS | Performed by: PSYCHIATRY & NEUROLOGY

## 2024-08-08 PROCEDURE — 1090F PRES/ABSN URINE INCON ASSESS: CPT | Performed by: PSYCHIATRY & NEUROLOGY

## 2024-08-08 PROCEDURE — 99215 OFFICE O/P EST HI 40 MIN: CPT | Performed by: PSYCHIATRY & NEUROLOGY

## 2024-08-08 PROCEDURE — G2211 COMPLEX E/M VISIT ADD ON: HCPCS | Performed by: PSYCHIATRY & NEUROLOGY

## 2024-08-08 PROCEDURE — 1036F TOBACCO NON-USER: CPT | Performed by: PSYCHIATRY & NEUROLOGY

## 2024-08-08 PROCEDURE — 1123F ACP DISCUSS/DSCN MKR DOCD: CPT | Performed by: PSYCHIATRY & NEUROLOGY

## 2024-08-08 PROCEDURE — G8417 CALC BMI ABV UP PARAM F/U: HCPCS | Performed by: PSYCHIATRY & NEUROLOGY

## 2024-08-08 PROCEDURE — G8427 DOCREV CUR MEDS BY ELIG CLIN: HCPCS | Performed by: PSYCHIATRY & NEUROLOGY

## 2024-08-08 RX ORDER — AMOXICILLIN 250 MG
1 CAPSULE ORAL DAILY
COMMUNITY

## 2024-08-08 RX ORDER — CARBIDOPA AND LEVODOPA 25; 100 MG/1; MG/1
1 TABLET, EXTENDED RELEASE ORAL NIGHTLY
Qty: 90 TABLET | Refills: 3 | Status: SHIPPED | OUTPATIENT
Start: 2024-08-08

## 2024-08-08 RX ORDER — VIT C/B6/B5/MAGNESIUM/HERB 173 50-5-6-5MG
500 CAPSULE ORAL DAILY
COMMUNITY

## 2024-08-08 RX ORDER — AMLODIPINE BESYLATE 5 MG/1
5 TABLET ORAL DAILY
COMMUNITY
Start: 2024-06-06

## 2024-08-08 ASSESSMENT — ENCOUNTER SYMPTOMS
BACK PAIN: 0
CONSTIPATION: 0

## 2024-08-08 NOTE — PROGRESS NOTES
daily      cetirizine (ZYRTEC) 10 MG tablet Take 1 tablet by mouth daily      Black Pepper-Turmeric (TURMERIC CURCUMIN) 5-1000 MG CAPS Take 1,000 mg by mouth 2 times daily at 0800 and 1400      Fish Oil-Cholecalciferol (OMEGA-3 FISH OIL/VITAMIN D3 PO) Take by mouth daily      anastrozole (ARIMIDEX) 1 MG tablet Take 1 tablet by mouth daily 90 tablet 3    AUVELITY  MG TBCR       betamethasone dipropionate 0.05 % cream       Cholecalciferol 50 MCG (2000 UT) CAPS Take by mouth 2 times daily      esomeprazole (NEXIUM) 40 MG delayed release capsule Take by mouth daily      levothyroxine (SYNTHROID) 75 MCG tablet Take by mouth every morning (before breakfast)      losartan (COZAAR) 100 MG tablet Take 1 tablet by mouth daily      melatonin 3 MG TABS tablet Take 3 tablets by mouth nightly as needed 10mg      OLANZapine (ZYPREXA) 10 MG tablet Take 1 tablet by mouth      oxazepam (SERAX) 15 MG capsule Take 1 capsule by mouth 3 times daily.      oxybutynin (DITROPAN-XL) 10 MG extended release tablet Take 1 tablet by mouth daily      potassium chloride (KLOR-CON M) 10 MEQ extended release tablet Take 2 tablets by mouth daily      olopatadine (PATANOL) 0.1 % ophthalmic solution Place 1 drop into both eyes 2 times daily (Patient not taking: Reported on 8/8/2024)       No current facility-administered medications on file prior to visit.       Allergies   Allergen Reactions    Mesalamine Other (See Comments)     Pt not sure Fever/chills    Pentosan Polysulfate Sodium Other (See Comments)     Other reaction(s): Other (See Comments), Unknown (comments)    Povidone-Iodine Hives    Penicillins Rash           Physical Examination    Vitals:    08/08/24 1049   BP: (!) 163/84   Pulse: 89   SpO2: 90%         Neurologic Exam    CN:  Extraocular movements are intact,Intact and strength is intact,    Cerebellar: FTN, HTS intact    Motor Examination: Last dose of sinemet was 3 hrs ago.    Voice tremor       Chin tremor         RIGHT LEFT

## 2024-10-22 ENCOUNTER — HOSPITAL ENCOUNTER (OUTPATIENT)
Dept: MAMMOGRAPHY | Age: 84
Discharge: HOME | End: 2024-10-25
Attending: INTERNAL MEDICINE
Payer: MEDICARE

## 2024-10-22 VITALS — WEIGHT: 202 LBS | BODY MASS INDEX: 33.66 KG/M2 | HEIGHT: 65 IN

## 2024-10-22 DIAGNOSIS — Z12.31 OTHER SCREENING MAMMOGRAM: ICD-10-CM

## 2024-10-22 PROCEDURE — 77067 SCR MAMMO BI INCL CAD: CPT

## 2024-10-25 DIAGNOSIS — Z17.0 MALIGNANT NEOPLASM OF RIGHT BREAST IN FEMALE, ESTROGEN RECEPTOR POSITIVE, UNSPECIFIED SITE OF BREAST (HCC): Primary | ICD-10-CM

## 2024-10-25 DIAGNOSIS — C50.911 MALIGNANT NEOPLASM OF RIGHT BREAST IN FEMALE, ESTROGEN RECEPTOR POSITIVE, UNSPECIFIED SITE OF BREAST (HCC): Primary | ICD-10-CM

## 2024-10-30 ENCOUNTER — OFFICE VISIT (OUTPATIENT)
Dept: ONCOLOGY | Age: 84
End: 2024-10-30
Payer: MEDICARE

## 2024-10-30 VITALS
TEMPERATURE: 97.7 F | RESPIRATION RATE: 16 BRPM | HEIGHT: 65 IN | BODY MASS INDEX: 33.22 KG/M2 | DIASTOLIC BLOOD PRESSURE: 70 MMHG | SYSTOLIC BLOOD PRESSURE: 179 MMHG | WEIGHT: 199.4 LBS | OXYGEN SATURATION: 93 % | HEART RATE: 81 BPM

## 2024-10-30 DIAGNOSIS — Z79.811 AROMATASE INHIBITOR USE: ICD-10-CM

## 2024-10-30 DIAGNOSIS — Z17.0 MALIGNANT NEOPLASM OF RIGHT BREAST IN FEMALE, ESTROGEN RECEPTOR POSITIVE, UNSPECIFIED SITE OF BREAST (HCC): Primary | ICD-10-CM

## 2024-10-30 DIAGNOSIS — M81.0 OSTEOPOROSIS WITHOUT CURRENT PATHOLOGICAL FRACTURE, UNSPECIFIED OSTEOPOROSIS TYPE: ICD-10-CM

## 2024-10-30 DIAGNOSIS — Z17.0 ESTROGEN RECEPTOR POSITIVE STATUS (ER+): ICD-10-CM

## 2024-10-30 DIAGNOSIS — C50.911 MALIGNANT NEOPLASM OF RIGHT BREAST IN FEMALE, ESTROGEN RECEPTOR POSITIVE, UNSPECIFIED SITE OF BREAST (HCC): Primary | ICD-10-CM

## 2024-10-30 PROCEDURE — 1126F AMNT PAIN NOTED NONE PRSNT: CPT | Performed by: INTERNAL MEDICINE

## 2024-10-30 PROCEDURE — 99214 OFFICE O/P EST MOD 30 MIN: CPT | Performed by: INTERNAL MEDICINE

## 2024-10-30 PROCEDURE — G8427 DOCREV CUR MEDS BY ELIG CLIN: HCPCS | Performed by: INTERNAL MEDICINE

## 2024-10-30 PROCEDURE — 1036F TOBACCO NON-USER: CPT | Performed by: INTERNAL MEDICINE

## 2024-10-30 PROCEDURE — 1159F MED LIST DOCD IN RCRD: CPT | Performed by: INTERNAL MEDICINE

## 2024-10-30 PROCEDURE — 1090F PRES/ABSN URINE INCON ASSESS: CPT | Performed by: INTERNAL MEDICINE

## 2024-10-30 PROCEDURE — G8484 FLU IMMUNIZE NO ADMIN: HCPCS | Performed by: INTERNAL MEDICINE

## 2024-10-30 PROCEDURE — G8417 CALC BMI ABV UP PARAM F/U: HCPCS | Performed by: INTERNAL MEDICINE

## 2024-10-30 PROCEDURE — G8399 PT W/DXA RESULTS DOCUMENT: HCPCS | Performed by: INTERNAL MEDICINE

## 2024-10-30 PROCEDURE — 1160F RVW MEDS BY RX/DR IN RCRD: CPT | Performed by: INTERNAL MEDICINE

## 2024-10-30 PROCEDURE — 1123F ACP DISCUSS/DSCN MKR DOCD: CPT | Performed by: INTERNAL MEDICINE

## 2024-10-30 RX ORDER — ALENDRONATE SODIUM 70 MG/1
70 TABLET ORAL
COMMUNITY
Start: 2024-09-03

## 2024-10-30 ASSESSMENT — PATIENT HEALTH QUESTIONNAIRE - PHQ9
SUM OF ALL RESPONSES TO PHQ QUESTIONS 1-9: 1
2. FEELING DOWN, DEPRESSED OR HOPELESS: SEVERAL DAYS
SUM OF ALL RESPONSES TO PHQ QUESTIONS 1-9: 1
SUM OF ALL RESPONSES TO PHQ QUESTIONS 1-9: 1
1. LITTLE INTEREST OR PLEASURE IN DOING THINGS: NOT AT ALL
SUM OF ALL RESPONSES TO PHQ QUESTIONS 1-9: 1
SUM OF ALL RESPONSES TO PHQ9 QUESTIONS 1 & 2: 1

## 2024-10-30 NOTE — PATIENT INSTRUCTIONS
Patient Information from Today's Visit    The members of your [facility] are listed below:    Physician Provider: Winnie Carroll Medical Oncologist  Advanced Practice Clinician: Lindsey Zarate NP  Registered Nurse: Mayte DSOUZA   Navigator:   Medical Assistant: Lu DEWITT MA  : Norma JOHNSON   Supportive Care Services: Michelle HUNT LMSW    Diagnosis:  Breast cancer ER+ HER2+        Follow Up Instructions:     Treatment Summary has been discussed and given to patient:    Examined breasts  Reviewed dexa scan  Vit d and ca supplements  Blood pressure is high  Every year mammogram  Reviewed labs  Current Labs:   No visits with results within 3 Day(s) from this visit.   Latest known visit with results is:   Hospital Outpatient Visit on 08/22/2023   Component Date Value Ref Range Status    WBC 08/22/2023 5.5  4.3 - 11.1 K/uL Final    RBC 08/22/2023 4.96  4.05 - 5.2 M/uL Final    Hemoglobin 08/22/2023 14.4  11.7 - 15.4 g/dL Final    Hematocrit 08/22/2023 46.3  35.8 - 46.3 % Final    MCV 08/22/2023 93.3  82.0 - 102.0 FL Final    MCH 08/22/2023 29.0  26.1 - 32.9 PG Final    MCHC 08/22/2023 31.1 (L)  31.4 - 35.0 g/dL Final    RDW 08/22/2023 14.9 (H)  11.9 - 14.6 % Final    Platelets 08/22/2023 198  150 - 450 K/uL Final    MPV 08/22/2023 9.6  9.4 - 12.3 FL Final    nRBC 08/22/2023 0.00  0.0 - 0.2 K/uL Final    **Note: Absolute NRBC parameter is now reported with Hemogram**    Differential Type 08/22/2023 AUTOMATED    Final    Neutrophils % 08/22/2023 67  43 - 78 % Final    Lymphocytes % 08/22/2023 25  13 - 44 % Final    Monocytes % 08/22/2023 6  4.0 - 12.0 % Final    Eosinophils % 08/22/2023 2  0.5 - 7.8 % Final    Basophils % 08/22/2023 1  0.0 - 2.0 % Final    Immature Granulocytes % 08/22/2023 0  0.0 - 5.0 % Final    Neutrophils Absolute 08/22/2023 3.7  1.7 - 8.2 K/UL Final    Lymphocytes Absolute 08/22/2023 1.4  0.5 - 4.6 K/UL Final    Monocytes Absolute 08/22/2023 0.4  0.1 - 1.3 K/UL Final    Eosinophils

## 2024-10-30 NOTE — PROGRESS NOTES
prognositic, therefore rec pursue  neoadjuvant TCH given her age, followed by surgery then herceptin vs T-DM1 depending on response and AI, arrange MRI for axillary eval showed no suspicious LN, cT1cN0, and discuss results at tumor board to proceed to neoadjuvant TCH, baseline EF 59% on  4/24/19, proceed to cycle 1 TCH 5/2/19, Humira for Crohn's was held and monitor, tolerated well in general except diarrhea for 3 days, continue imodium as needed and add lomotil if needed, c/o taste change and anorexia, added marinol, wean off decadron  for anxiety worse, give IVF, discussed nutrition, US after cycle 3 showed significant FL, EF stable, fell when putting on jeans, refer to onc rehab for strength and balance training, doing better and proceeded to cycle 6 and had lumpectomy/SLND on 9/11/19,  final pathology showed remaining tumor of 1.2 cm and negative LN, discussed and switched to T-DM1 from 10/17/19, adjuvant XRT finished 10/2019, discussed her weight loss and eating/depression, discussed AI and arranged DEXA, hold reclast till right foot  fracture heals, start Arimidex from 12/2019, started reclast, increased anorexia and slowly losing weight, tried various appetite stimulant and would try CBD again, check UA for dysuria, plt 69 and proceed but hold off next cycle and RTC in 6 weeks, EF  stable, call as needed. She continued to feel weaker, confused, Sandie reported signs of aspiration, x ray showed lung infiltrates and PCP started lasix, Echo/BNP WNL, and her severe anemia appeared major cause of her change and felt much improved after  pRBC, swallow eval normal and COVID negative, stopped T-DM1 after cycle 10, continue arimidex, recovered from anemia, still having dizziness and not much activity, not eating much but weight stable, followed 8/11/2022, repeat mammogram no malignancy and a DEXA showed osteopenia, has been well in general but recently fell after feeling dizzy, fracture, no focal deficit and discussed a

## 2024-11-06 DIAGNOSIS — C50.911 MALIGNANT NEOPLASM OF RIGHT BREAST IN FEMALE, ESTROGEN RECEPTOR POSITIVE, UNSPECIFIED SITE OF BREAST (HCC): Primary | ICD-10-CM

## 2024-11-06 DIAGNOSIS — Z17.0 MALIGNANT NEOPLASM OF RIGHT BREAST IN FEMALE, ESTROGEN RECEPTOR POSITIVE, UNSPECIFIED SITE OF BREAST (HCC): Primary | ICD-10-CM

## 2024-11-06 DIAGNOSIS — Z17.0 ESTROGEN RECEPTOR POSITIVE STATUS (ER+): ICD-10-CM

## 2024-11-06 RX ORDER — ANASTROZOLE 1 MG/1
1 TABLET ORAL DAILY
Qty: 60 TABLET | Refills: 0 | Status: SHIPPED | OUTPATIENT
Start: 2024-11-06

## 2025-02-10 NOTE — PROGRESS NOTES
System    Hunger Vital Sign     Worried About Running Out of Food in the Last Year: Never true     Ran Out of Food in the Last Year: Never true   Transportation Needs: No Transportation Needs (11/24/2024)    Received from [facility]    PRAPARE - Transportation     Lack of Transportation (Medical): No     Lack of Transportation (Non-Medical): No   Physical Activity: Insufficiently Active (11/24/2024)    Received from [facility]    Exercise Vital Sign     Days of Exercise per Week: 3 days     Minutes of Exercise per Session: 10 min   Stress: Stress Concern Present (10/28/2024)    Received from [facility]    [employer] - Occupational Stress Questionnaire     Feeling of Stress : To some extent   Social Connections: Unknown (11/24/2024)    Received from [facility]    Social Connection and Isolation Panel [NHANES]     Frequency of Communication with Friends and Family: Twice a week     Frequency of Social Gatherings with Friends and Family: More than three times a week     Attends [employer] Services: Not on file     Active Member of Clubs or Organizations: Not on file     Attends Club or Organization Meetings: Not on file     Marital Status:    Intimate Partner Violence: Not At Risk (11/24/2024)    Received from [facility]    Humiliation, Afraid, Rape, and Kick questionnaire     Fear of Current or Ex-Partner: No     Emotionally Abused: No     Physically Abused: No     Sexually Abused: No   Housing Stability: Low Risk  (11/24/2024)    Received from [facility]    Housing Stability Vital Sign     Unable to Pay for Housing in the Last Year: No     Number of Times Moved in the Last Year: 0     Homeless in the Last Year: No       Current Outpatient Medications on File Prior to Visit   Medication Sig Dispense Refill    aspirin

## 2025-02-11 ENCOUNTER — OFFICE VISIT (OUTPATIENT)
Dept: NEUROLOGY | Age: 85
End: 2025-02-11
Payer: MEDICARE

## 2025-02-11 VITALS
WEIGHT: 185 LBS | OXYGEN SATURATION: 92 % | BODY MASS INDEX: 30.79 KG/M2 | HEART RATE: 76 BPM | DIASTOLIC BLOOD PRESSURE: 83 MMHG | SYSTOLIC BLOOD PRESSURE: 155 MMHG

## 2025-02-11 DIAGNOSIS — R25.1 TREMOR: ICD-10-CM

## 2025-02-11 DIAGNOSIS — G20.A1 PARKINSON'S DISEASE WITHOUT DYSKINESIA OR FLUCTUATING MANIFESTATIONS (HCC): Primary | ICD-10-CM

## 2025-02-11 DIAGNOSIS — K59.01 CONSTIPATION BY DELAYED COLONIC TRANSIT: ICD-10-CM

## 2025-02-11 DIAGNOSIS — R26.89 FREEZING OF GAIT: ICD-10-CM

## 2025-02-11 PROCEDURE — 99215 OFFICE O/P EST HI 40 MIN: CPT | Performed by: PSYCHIATRY & NEUROLOGY

## 2025-02-11 PROCEDURE — G8427 DOCREV CUR MEDS BY ELIG CLIN: HCPCS | Performed by: PSYCHIATRY & NEUROLOGY

## 2025-02-11 PROCEDURE — G8399 PT W/DXA RESULTS DOCUMENT: HCPCS | Performed by: PSYCHIATRY & NEUROLOGY

## 2025-02-11 PROCEDURE — 1159F MED LIST DOCD IN RCRD: CPT | Performed by: PSYCHIATRY & NEUROLOGY

## 2025-02-11 PROCEDURE — 1036F TOBACCO NON-USER: CPT | Performed by: PSYCHIATRY & NEUROLOGY

## 2025-02-11 PROCEDURE — 1123F ACP DISCUSS/DSCN MKR DOCD: CPT | Performed by: PSYCHIATRY & NEUROLOGY

## 2025-02-11 PROCEDURE — G8417 CALC BMI ABV UP PARAM F/U: HCPCS | Performed by: PSYCHIATRY & NEUROLOGY

## 2025-02-11 PROCEDURE — 1160F RVW MEDS BY RX/DR IN RCRD: CPT | Performed by: PSYCHIATRY & NEUROLOGY

## 2025-02-11 PROCEDURE — 1090F PRES/ABSN URINE INCON ASSESS: CPT | Performed by: PSYCHIATRY & NEUROLOGY

## 2025-02-11 RX ORDER — AMLODIPINE BESYLATE 10 MG/1
10 TABLET ORAL DAILY
COMMUNITY
Start: 2025-01-30

## 2025-02-11 RX ORDER — OLMESARTAN MEDOXOMIL 20 MG/1
20 TABLET ORAL DAILY
COMMUNITY
Start: 2024-12-19 | End: 2025-12-19

## 2025-02-11 RX ORDER — LANOLIN ALCOHOL/MO/W.PET/CERES
1000 CREAM (GRAM) TOPICAL DAILY
COMMUNITY

## 2025-02-11 RX ORDER — DAPAGLIFLOZIN 10 MG/1
10 TABLET, FILM COATED ORAL DAILY
COMMUNITY

## 2025-02-11 RX ORDER — ASPIRIN 81 MG/1
81 TABLET ORAL DAILY
COMMUNITY
Start: 2024-11-16 | End: 2026-01-30

## 2025-02-11 ASSESSMENT — ENCOUNTER SYMPTOMS
CONSTIPATION: 0
BACK PAIN: 0